# Patient Record
Sex: FEMALE | Race: OTHER | HISPANIC OR LATINO
[De-identification: names, ages, dates, MRNs, and addresses within clinical notes are randomized per-mention and may not be internally consistent; named-entity substitution may affect disease eponyms.]

---

## 2021-12-18 ENCOUNTER — NON-APPOINTMENT (OUTPATIENT)
Age: 36
End: 2021-12-18

## 2022-01-02 ENCOUNTER — TRANSCRIPTION ENCOUNTER (OUTPATIENT)
Age: 37
End: 2022-01-02

## 2022-01-10 ENCOUNTER — APPOINTMENT (OUTPATIENT)
Dept: CARDIOLOGY | Facility: CLINIC | Age: 37
End: 2022-01-10
Payer: MEDICAID

## 2022-01-10 VITALS — HEIGHT: 62 IN | BODY MASS INDEX: 53.37 KG/M2 | WEIGHT: 290 LBS

## 2022-01-10 PROCEDURE — 99204 OFFICE O/P NEW MOD 45 MIN: CPT | Mod: 95

## 2022-01-11 ENCOUNTER — TRANSCRIPTION ENCOUNTER (OUTPATIENT)
Age: 37
End: 2022-01-11

## 2022-03-28 ENCOUNTER — TRANSCRIPTION ENCOUNTER (OUTPATIENT)
Age: 37
End: 2022-03-28

## 2022-06-05 ENCOUNTER — NON-APPOINTMENT (OUTPATIENT)
Age: 37
End: 2022-06-05

## 2022-08-03 ENCOUNTER — NON-APPOINTMENT (OUTPATIENT)
Age: 37
End: 2022-08-03

## 2022-08-03 ENCOUNTER — TRANSCRIPTION ENCOUNTER (OUTPATIENT)
Age: 37
End: 2022-08-03

## 2022-08-03 ENCOUNTER — APPOINTMENT (OUTPATIENT)
Dept: GASTROENTEROLOGY | Facility: CLINIC | Age: 37
End: 2022-08-03

## 2022-08-03 PROCEDURE — 99442: CPT

## 2022-08-17 ENCOUNTER — OUTPATIENT (OUTPATIENT)
Dept: OUTPATIENT SERVICES | Facility: HOSPITAL | Age: 37
LOS: 1 days | End: 2022-08-17

## 2022-08-17 VITALS
RESPIRATION RATE: 16 BRPM | HEIGHT: 61 IN | HEART RATE: 100 BPM | TEMPERATURE: 99 F | DIASTOLIC BLOOD PRESSURE: 93 MMHG | WEIGHT: 293 LBS | OXYGEN SATURATION: 97 % | SYSTOLIC BLOOD PRESSURE: 137 MMHG

## 2022-08-17 DIAGNOSIS — E66.01 MORBID (SEVERE) OBESITY DUE TO EXCESS CALORIES: ICD-10-CM

## 2022-08-17 DIAGNOSIS — I10 ESSENTIAL (PRIMARY) HYPERTENSION: ICD-10-CM

## 2022-08-17 DIAGNOSIS — Z90.49 ACQUIRED ABSENCE OF OTHER SPECIFIED PARTS OF DIGESTIVE TRACT: Chronic | ICD-10-CM

## 2022-08-17 DIAGNOSIS — G47.30 SLEEP APNEA, UNSPECIFIED: ICD-10-CM

## 2022-08-17 LAB
ALBUMIN SERPL ELPH-MCNC: 3.8 G/DL — SIGNIFICANT CHANGE UP (ref 3.3–5)
ALP SERPL-CCNC: 103 U/L — SIGNIFICANT CHANGE UP (ref 40–120)
ALT FLD-CCNC: 107 U/L — HIGH (ref 4–33)
ANION GAP SERPL CALC-SCNC: 14 MMOL/L — SIGNIFICANT CHANGE UP (ref 7–14)
AST SERPL-CCNC: 114 U/L — HIGH (ref 4–32)
BILIRUB SERPL-MCNC: <0.2 MG/DL — SIGNIFICANT CHANGE UP (ref 0.2–1.2)
BUN SERPL-MCNC: 10 MG/DL — SIGNIFICANT CHANGE UP (ref 7–23)
CALCIUM SERPL-MCNC: 9.2 MG/DL — SIGNIFICANT CHANGE UP (ref 8.4–10.5)
CHLORIDE SERPL-SCNC: 103 MMOL/L — SIGNIFICANT CHANGE UP (ref 98–107)
CO2 SERPL-SCNC: 23 MMOL/L — SIGNIFICANT CHANGE UP (ref 22–31)
CREAT SERPL-MCNC: 0.55 MG/DL — SIGNIFICANT CHANGE UP (ref 0.5–1.3)
EGFR: 122 ML/MIN/1.73M2 — SIGNIFICANT CHANGE UP
GLUCOSE SERPL-MCNC: 148 MG/DL — HIGH (ref 70–99)
HCG UR QL: NEGATIVE — SIGNIFICANT CHANGE UP
HCT VFR BLD CALC: 40.9 % — SIGNIFICANT CHANGE UP (ref 34.5–45)
HGB BLD-MCNC: 12.8 G/DL — SIGNIFICANT CHANGE UP (ref 11.5–15.5)
MCHC RBC-ENTMCNC: 27.8 PG — SIGNIFICANT CHANGE UP (ref 27–34)
MCHC RBC-ENTMCNC: 31.3 GM/DL — LOW (ref 32–36)
MCV RBC AUTO: 88.7 FL — SIGNIFICANT CHANGE UP (ref 80–100)
NRBC # BLD: 0 /100 WBCS — SIGNIFICANT CHANGE UP (ref 0–0)
NRBC # FLD: 0 K/UL — SIGNIFICANT CHANGE UP (ref 0–0)
PLATELET # BLD AUTO: 264 K/UL — SIGNIFICANT CHANGE UP (ref 150–400)
POTASSIUM SERPL-MCNC: 3.6 MMOL/L — SIGNIFICANT CHANGE UP (ref 3.5–5.3)
POTASSIUM SERPL-SCNC: 3.6 MMOL/L — SIGNIFICANT CHANGE UP (ref 3.5–5.3)
PROT SERPL-MCNC: 6.9 G/DL — SIGNIFICANT CHANGE UP (ref 6–8.3)
RBC # BLD: 4.61 M/UL — SIGNIFICANT CHANGE UP (ref 3.8–5.2)
RBC # FLD: 14.6 % — HIGH (ref 10.3–14.5)
SODIUM SERPL-SCNC: 140 MMOL/L — SIGNIFICANT CHANGE UP (ref 135–145)
WBC # BLD: 8 K/UL — SIGNIFICANT CHANGE UP (ref 3.8–10.5)
WBC # FLD AUTO: 8 K/UL — SIGNIFICANT CHANGE UP (ref 3.8–10.5)

## 2022-08-17 PROCEDURE — 93010 ELECTROCARDIOGRAM REPORT: CPT

## 2022-08-17 NOTE — H&P PST ADULT - VENOUS THROMBOEMBOLISM
no

## 2022-08-17 NOTE — H&P PST ADULT - HEIGHT IN CM
[facility]  Obstetrics  Postpartum Progress Note    Patient Name: Olga Prater  MRN: [id number]  Admission Date: 2019  Hospital Length of Stay: 3 days  Attending Physician: Mohan Pastrana MD  Primary Care Provider: Primary Doctor No    Subjective:     Principal Problem: delivery delivered    Hospital course: POD#1: Feeling well, denies complaints  POD#2: Patient notes increased pain, wants to try Norco for pain control.   POD#3: Feeling well, denies complaints      Interval History:     She is doing well this morning. She is tolerating a regular diet without nausea or vomiting. She is voiding spontaneously. She is ambulating. She has passed flatus, and has a BM. Vaginal bleeding is mild. She denies fever or chills. Abdominal pain is mild and controlled with oral medications.     Objective:     Vital Signs (Most Recent):  Temp: 98.2 °F (36.8 °C) (19 0000)  Pulse: 70 (19 0000)  Resp: 18 (19 0000)  BP: 121/77 (19 0000)  SpO2: 98 % (19 0000) Vital Signs (24h Range):  Temp:  [97.8 °F (36.6 °C)-98.7 °F (37.1 °C)] 98.2 °F (36.8 °C)  Pulse:  [] 70  Resp:  [18] 18  SpO2:  [96 %-99 %] 98 %  BP: (112-130)/(60-80) 121/77     Weight: 64.5 kg (142 lb 3.2 oz)  Body mass index is 26.87 kg/m².    No intake or output data in the 24 hours ending 19 0842    Significant Labs:  Lab Results   Component Value Date    GROUPTRH O POS 2019    HEPBSAG Negative 2018    STREPBCULT No Group B Streptococcus isolated 2019     No results for input(s): HGB, HCT in the last 48 hours.    I have personallly reviewed all pertinent lab results from the last 24 hours.    Physical Exam:   Constitutional: She is oriented to person, place, and time. She appears well-developed and well-nourished. No distress.    HENT:   Head: Normocephalic and atraumatic.      Cardiovascular: Normal rate, regular rhythm, normal heart sounds and intact distal pulses.      Pulmonary/Chest: Effort normal. No respiratory distress.        Abdominal: Soft. Bowel sounds are normal. Abdominal incision: c/d/i. There is no guarding.   Firm fundus below umbilicus     Genitourinary: Uterus normal. No vaginal discharge found.           Musculoskeletal: Normal range of motion and moves all extremeties.       Neurological: She is alert and oriented to person, place, and time.    Skin: Skin is warm. She is not diaphoretic.    Psychiatric: She has a normal mood and affect. Her behavior is normal. Judgment and thought content normal.       Assessment/Plan:     32 y.o. female  for:    *  delivery delivered  Good post operative condition    Acute blood loss anemia  Acute blood loss anemia due to intrapartum blood loss. The patient is asymptomatic of the anemia - no intervention is indicated.  Restart PNV/iron with stool softner on DC.          Disposition: As patient meets milestones, will plan to discharge today.    Rita Centeno MD  Obstetrics  [facility]       [phone]

## 2022-08-17 NOTE — H&P PST ADULT - NSICDXPASTMEDICALHX_GEN_ALL_CORE_FT
PAST MEDICAL HISTORY:  H/O sleep apnea uses device at night. Done with [facility] [phone]    Morbid obesity

## 2022-08-17 NOTE — H&P PST ADULT - REASON FOR ADMISSION
they are going down my throat because I am to have a gastric sleeve procedure they are going down my throat to see if everything is okay because I am to have a gastric sleeve procedure

## 2022-08-17 NOTE — H&P PST ADULT - PROBLEM SELECTOR PLAN 1
This is a 37 y/o female who is scheduled for esophagogastroduodenoscopy on 9-6-22  * Ordered covid-19 pcr; given phone number and places that do covid testing to be done 3-5 days before surgery  * Given preop instructions

## 2022-08-17 NOTE — H&P PST ADULT - HISTORY OF PRESENT ILLNESS
This is a 37 y/o female who presents with morbid obesity. Having work up for pending gastric sleeve procedure. Scheduled for esophagogastroduodenoscopy

## 2022-09-01 ENCOUNTER — NON-APPOINTMENT (OUTPATIENT)
Age: 37
End: 2022-09-01

## 2022-09-06 ENCOUNTER — OUTPATIENT (OUTPATIENT)
Dept: OUTPATIENT SERVICES | Facility: HOSPITAL | Age: 37
LOS: 1 days | Discharge: HOME | End: 2022-09-06

## 2022-09-06 ENCOUNTER — APPOINTMENT (OUTPATIENT)
Dept: GASTROENTEROLOGY | Facility: HOSPITAL | Age: 37
End: 2022-09-06

## 2022-09-06 ENCOUNTER — RESULT REVIEW (OUTPATIENT)
Age: 37
End: 2022-09-06

## 2022-09-06 VITALS
WEIGHT: 293 LBS | SYSTOLIC BLOOD PRESSURE: 149 MMHG | RESPIRATION RATE: 20 BRPM | HEART RATE: 84 BPM | HEIGHT: 61 IN | DIASTOLIC BLOOD PRESSURE: 75 MMHG | TEMPERATURE: 97 F | OXYGEN SATURATION: 98 %

## 2022-09-06 VITALS
RESPIRATION RATE: 14 BRPM | DIASTOLIC BLOOD PRESSURE: 84 MMHG | HEART RATE: 84 BPM | SYSTOLIC BLOOD PRESSURE: 120 MMHG | OXYGEN SATURATION: 99 %

## 2022-09-06 DIAGNOSIS — Z90.49 ACQUIRED ABSENCE OF OTHER SPECIFIED PARTS OF DIGESTIVE TRACT: Chronic | ICD-10-CM

## 2022-09-06 DIAGNOSIS — E66.01 MORBID (SEVERE) OBESITY DUE TO EXCESS CALORIES: ICD-10-CM

## 2022-09-06 LAB — HCG UR QL: NEGATIVE — SIGNIFICANT CHANGE UP

## 2022-09-06 PROCEDURE — 88305 TISSUE EXAM BY PATHOLOGIST: CPT | Mod: 26

## 2022-09-06 PROCEDURE — 43239 EGD BIOPSY SINGLE/MULTIPLE: CPT

## 2022-09-06 NOTE — ASU PATIENT PROFILE, ADULT - FALL HARM RISK - HARM RISK INTERVENTIONS

## 2022-09-06 NOTE — ASU PATIENT PROFILE, ADULT - NSICDXPASTMEDICALHX_GEN_ALL_CORE_FT
PAST MEDICAL HISTORY:  H/O sleep apnea uses device at night. Done with [facility] [phone]    Morbid obesity

## 2022-09-12 LAB — SURGICAL PATHOLOGY STUDY: SIGNIFICANT CHANGE UP

## 2022-09-13 ENCOUNTER — NON-APPOINTMENT (OUTPATIENT)
Age: 37
End: 2022-09-13

## 2022-11-09 ENCOUNTER — NON-APPOINTMENT (OUTPATIENT)
Age: 37
End: 2022-11-09

## 2022-11-10 PROBLEM — E66.01 MORBID (SEVERE) OBESITY DUE TO EXCESS CALORIES: Chronic | Status: ACTIVE | Noted: 2022-08-17

## 2022-11-14 ENCOUNTER — OUTPATIENT (OUTPATIENT)
Dept: OUTPATIENT SERVICES | Facility: HOSPITAL | Age: 37
LOS: 1 days | End: 2022-11-14
Payer: MEDICAID

## 2022-11-14 ENCOUNTER — APPOINTMENT (OUTPATIENT)
Dept: OBGYN | Facility: CLINIC | Age: 37
End: 2022-11-14

## 2022-11-14 VITALS
SYSTOLIC BLOOD PRESSURE: 118 MMHG | BODY MASS INDEX: 53.92 KG/M2 | HEART RATE: 106 BPM | DIASTOLIC BLOOD PRESSURE: 74 MMHG | HEIGHT: 62 IN | OXYGEN SATURATION: 100 % | RESPIRATION RATE: 18 BRPM | TEMPERATURE: 98.2 F | WEIGHT: 293 LBS

## 2022-11-14 DIAGNOSIS — Z90.49 ACQUIRED ABSENCE OF OTHER SPECIFIED PARTS OF DIGESTIVE TRACT: Chronic | ICD-10-CM

## 2022-11-14 DIAGNOSIS — Z34.00 ENCOUNTER FOR SUPERVISION OF NORMAL FIRST PREGNANCY, UNSPECIFIED TRIMESTER: ICD-10-CM

## 2022-11-14 PROCEDURE — 80053 COMPREHEN METABOLIC PANEL: CPT

## 2022-11-14 PROCEDURE — 36415 COLL VENOUS BLD VENIPUNCTURE: CPT | Mod: NC

## 2022-11-14 PROCEDURE — 86787 VARICELLA-ZOSTER ANTIBODY: CPT

## 2022-11-14 PROCEDURE — 87800 DETECT AGNT MULT DNA DIREC: CPT

## 2022-11-14 PROCEDURE — 84156 ASSAY OF PROTEIN URINE: CPT

## 2022-11-14 PROCEDURE — 86480 TB TEST CELL IMMUN MEASURE: CPT

## 2022-11-14 PROCEDURE — 81420 FETAL CHRMOML ANEUPLOIDY: CPT

## 2022-11-14 PROCEDURE — G0463: CPT

## 2022-11-14 PROCEDURE — 86780 TREPONEMA PALLIDUM: CPT

## 2022-11-14 PROCEDURE — 86765 RUBEOLA ANTIBODY: CPT

## 2022-11-14 PROCEDURE — 86850 RBC ANTIBODY SCREEN: CPT

## 2022-11-14 PROCEDURE — 81329 SMN1 GENE DOS/DELETION ALYS: CPT

## 2022-11-14 PROCEDURE — 81025 URINE PREGNANCY TEST: CPT

## 2022-11-14 PROCEDURE — 87491 CHLMYD TRACH DNA AMP PROBE: CPT

## 2022-11-14 PROCEDURE — 83020 HEMOGLOBIN ELECTROPHORESIS: CPT

## 2022-11-14 PROCEDURE — 85025 COMPLETE CBC W/AUTO DIFF WBC: CPT

## 2022-11-14 PROCEDURE — 99204 OFFICE O/P NEW MOD 45 MIN: CPT

## 2022-11-14 PROCEDURE — 83036 HEMOGLOBIN GLYCOSYLATED A1C: CPT

## 2022-11-14 PROCEDURE — 87340 HEPATITIS B SURFACE AG IA: CPT

## 2022-11-14 PROCEDURE — 81220 CFTR GENE COM VARIANTS: CPT

## 2022-11-14 PROCEDURE — 87624 HPV HI-RISK TYP POOLED RSLT: CPT

## 2022-11-14 PROCEDURE — 84550 ASSAY OF BLOOD/URIC ACID: CPT

## 2022-11-14 PROCEDURE — 87591 N.GONORRHOEAE DNA AMP PROB: CPT

## 2022-11-14 PROCEDURE — 36415 COLL VENOUS BLD VENIPUNCTURE: CPT

## 2022-11-14 PROCEDURE — 87086 URINE CULTURE/COLONY COUNT: CPT

## 2022-11-14 PROCEDURE — 84443 ASSAY THYROID STIM HORMONE: CPT

## 2022-11-14 PROCEDURE — 87389 HIV-1 AG W/HIV-1&-2 AB AG IA: CPT

## 2022-11-14 PROCEDURE — 84439 ASSAY OF FREE THYROXINE: CPT

## 2022-11-14 PROCEDURE — 86901 BLOOD TYPING SEROLOGIC RH(D): CPT

## 2022-11-14 PROCEDURE — 86803 HEPATITIS C AB TEST: CPT

## 2022-11-14 PROCEDURE — 86900 BLOOD TYPING SEROLOGIC ABO: CPT

## 2022-11-14 PROCEDURE — 86762 RUBELLA ANTIBODY: CPT

## 2022-11-14 PROCEDURE — 83655 ASSAY OF LEAD: CPT

## 2022-11-14 PROCEDURE — 81003 URINALYSIS AUTO W/O SCOPE: CPT

## 2022-11-16 ENCOUNTER — NON-APPOINTMENT (OUTPATIENT)
Age: 37
End: 2022-11-16

## 2022-11-16 DIAGNOSIS — Z34.92 ENCOUNTER FOR SUPERVISION OF NORMAL PREGNANCY, UNSPECIFIED, SECOND TRIMESTER: ICD-10-CM

## 2022-11-16 DIAGNOSIS — Z12.4 ENCOUNTER FOR SCREENING FOR MALIGNANT NEOPLASM OF CERVIX: ICD-10-CM

## 2022-11-16 DIAGNOSIS — Z3A.16 16 WEEKS GESTATION OF PREGNANCY: ICD-10-CM

## 2022-11-16 DIAGNOSIS — E66.01 MORBID (SEVERE) OBESITY DUE TO EXCESS CALORIES: ICD-10-CM

## 2022-11-16 DIAGNOSIS — Z11.3 ENCOUNTER FOR SCREENING FOR INFECTIONS WITH A PREDOMINANTLY SEXUAL MODE OF TRANSMISSION: ICD-10-CM

## 2022-11-16 LAB
ABO + RH PNL BLD: NORMAL
ALBUMIN SERPL ELPH-MCNC: 3.8 G/DL
ALP BLD-CCNC: 74 U/L
ALT SERPL-CCNC: 75 U/L
ANION GAP SERPL CALC-SCNC: 11 MMOL/L
AST SERPL-CCNC: 82 U/L
BASOPHILS # BLD AUTO: 0.04 K/UL
BASOPHILS NFR BLD AUTO: 0.5 %
BILIRUB SERPL-MCNC: 0.2 MG/DL
BLD GP AB SCN SERPL QL: NORMAL
BUN SERPL-MCNC: 10 MG/DL
C TRACH RRNA SPEC QL NAA+PROBE: NOT DETECTED
CALCIUM SERPL-MCNC: 9.4 MG/DL
CANDIDA VAG CYTO: NOT DETECTED
CHLORIDE SERPL-SCNC: 104 MMOL/L
CO2 SERPL-SCNC: 24 MMOL/L
CREAT SERPL-MCNC: 0.56 MG/DL
CREAT SPEC-SCNC: 176 MG/DL
CREAT/PROT UR: 0.1 RATIO
EGFR: 120 ML/MIN/1.73M2
EOSINOPHIL # BLD AUTO: 0.18 K/UL
EOSINOPHIL NFR BLD AUTO: 2.1 %
ESTIMATED AVERAGE GLUCOSE: 114 MG/DL
G VAGINALIS+PREV SP MTYP VAG QL MICRO: NOT DETECTED
GLUCOSE SERPL-MCNC: 96 MG/DL
HBA1C MFR BLD HPLC: 5.6 %
HBV SURFACE AG SER QL: NONREACTIVE
HCT VFR BLD CALC: 40.5 %
HCV AB SER QL: NONREACTIVE
HCV S/CO RATIO: 0.18 S/CO
HGB A MFR BLD: 97.2 %
HGB A2 MFR BLD: 2.5 %
HGB BLD-MCNC: 12.9 G/DL
HGB F MFR BLD: 0.3 %
HGB FRACT BLD-IMP: NORMAL
HIV1+2 AB SPEC QL IA.RAPID: NONREACTIVE
HPV HIGH+LOW RISK DNA PNL CVX: NOT DETECTED
IMM GRANULOCYTES NFR BLD AUTO: 0.2 %
LEAD BLD-MCNC: <1 UG/DL
LYMPHOCYTES # BLD AUTO: 2.45 K/UL
LYMPHOCYTES NFR BLD AUTO: 28.4 %
M TB IFN-G BLD-IMP: NEGATIVE
MAN DIFF?: NORMAL
MCHC RBC-ENTMCNC: 28.9 PG
MCHC RBC-ENTMCNC: 31.9 GM/DL
MCV RBC AUTO: 90.6 FL
MEV IGG FLD QL IA: 9.8 AU/ML
MEV IGG+IGM SER-IMP: NEGATIVE
MONOCYTES # BLD AUTO: 0.66 K/UL
MONOCYTES NFR BLD AUTO: 7.6 %
N GONORRHOEA RRNA SPEC QL NAA+PROBE: NOT DETECTED
NEUTROPHILS # BLD AUTO: 5.28 K/UL
NEUTROPHILS NFR BLD AUTO: 61.2 %
PLATELET # BLD AUTO: 272 K/UL
POTASSIUM SERPL-SCNC: 4.4 MMOL/L
PROT SERPL-MCNC: 6.8 G/DL
PROT UR-MCNC: 22 MG/DL
QUANTIFERON TB PLUS MITOGEN MINUS NIL: >10 IU/ML
QUANTIFERON TB PLUS NIL: 0.04 IU/ML
QUANTIFERON TB PLUS TB1 MINUS NIL: 0 IU/ML
QUANTIFERON TB PLUS TB2 MINUS NIL: 0 IU/ML
RBC # BLD: 4.47 M/UL
RBC # FLD: 15.1 %
RUBV IGG FLD-ACNC: 1.4 INDEX
RUBV IGG SER-IMP: POSITIVE
SODIUM SERPL-SCNC: 139 MMOL/L
SOURCE TP AMPLIFICATION: NORMAL
T PALLIDUM AB SER QL IA: NEGATIVE
T VAGINALIS VAG QL WET PREP: NOT DETECTED
T4 FREE SERPL-MCNC: 1 NG/DL
TSH SERPL-ACNC: 1 UIU/ML
URATE SERPL-MCNC: 5 MG/DL
VZV AB TITR SER: POSITIVE
VZV IGG SER IF-ACNC: 976.5 INDEX
WBC # FLD AUTO: 8.63 K/UL

## 2022-11-17 LAB — BACTERIA UR CULT: NORMAL

## 2022-11-18 ENCOUNTER — OUTPATIENT (OUTPATIENT)
Dept: OUTPATIENT SERVICES | Facility: HOSPITAL | Age: 37
LOS: 1 days | End: 2022-11-18
Payer: MEDICAID

## 2022-11-18 DIAGNOSIS — Z34.90 ENCOUNTER FOR SUPERVISION OF NORMAL PREGNANCY, UNSPECIFIED, UNSPECIFIED TRIMESTER: ICD-10-CM

## 2022-11-18 DIAGNOSIS — Z90.49 ACQUIRED ABSENCE OF OTHER SPECIFIED PARTS OF DIGESTIVE TRACT: Chronic | ICD-10-CM

## 2022-11-18 LAB — AR GENE MUT ANL BLD/T: NORMAL

## 2022-11-18 PROCEDURE — 81243 FMR1 GEN ALY DETC ABNL ALLEL: CPT

## 2022-11-18 PROCEDURE — 36415 COLL VENOUS BLD VENIPUNCTURE: CPT

## 2022-11-21 LAB
CFTR MUT TESTED BLD/T: NEGATIVE
CYTOLOGY CVX/VAG DOC THIN PREP: NORMAL
FMR1 GENE MUT ANL BLD/T: NORMAL

## 2022-11-23 ENCOUNTER — NON-APPOINTMENT (OUTPATIENT)
Age: 37
End: 2022-11-23

## 2022-11-28 ENCOUNTER — OUTPATIENT (OUTPATIENT)
Dept: OUTPATIENT SERVICES | Facility: HOSPITAL | Age: 37
LOS: 1 days | End: 2022-11-28
Payer: MEDICAID

## 2022-11-28 DIAGNOSIS — Z90.49 ACQUIRED ABSENCE OF OTHER SPECIFIED PARTS OF DIGESTIVE TRACT: Chronic | ICD-10-CM

## 2022-11-28 DIAGNOSIS — Z34.90 ENCOUNTER FOR SUPERVISION OF NORMAL PREGNANCY, UNSPECIFIED, UNSPECIFIED TRIMESTER: ICD-10-CM

## 2022-11-28 PROCEDURE — 81420 FETAL CHRMOML ANEUPLOIDY: CPT

## 2022-11-28 PROCEDURE — 36415 COLL VENOUS BLD VENIPUNCTURE: CPT

## 2022-12-01 ENCOUNTER — ASOB RESULT (OUTPATIENT)
Age: 37
End: 2022-12-01

## 2022-12-01 ENCOUNTER — APPOINTMENT (OUTPATIENT)
Dept: ANTEPARTUM | Facility: CLINIC | Age: 37
End: 2022-12-01

## 2022-12-01 PROCEDURE — 76805 OB US >/= 14 WKS SNGL FETUS: CPT

## 2022-12-06 LAB
CLARI TEST COMMENT: NORMAL
CLARIM 22Q11.2: NORMAL
CLARIM ADDITIONAL INFO: NORMAL
CLARIM CHROMOSOME 13: NORMAL
CLARIM CHROMOSOME 18: NORMAL
CLARIM CHROMOSOME 21: NORMAL
CLARIM EDD: NORMAL
CLARIM SEX CHROMOSOMES: NORMAL
CLARITEST NIPT W/MICRO: NORMAL
FETAL FRACT: NORMAL
GESTATION AGE: NORMAL
MATERNAL WEIGHT (LBS):: 306
PLEASE INCLUDE GENDER RESULTS ON THIS REPORT:: NORMAL
TYPE OF PREGNANCY:: NORMAL

## 2022-12-08 ENCOUNTER — APPOINTMENT (OUTPATIENT)
Dept: ULTRASOUND IMAGING | Facility: HOSPITAL | Age: 37
End: 2022-12-08

## 2022-12-08 ENCOUNTER — OUTPATIENT (OUTPATIENT)
Dept: OUTPATIENT SERVICES | Facility: HOSPITAL | Age: 37
LOS: 1 days | End: 2022-12-08
Payer: MEDICAID

## 2022-12-08 DIAGNOSIS — R74.8 ABNORMAL LEVELS OF OTHER SERUM ENZYMES: ICD-10-CM

## 2022-12-08 DIAGNOSIS — Z90.49 ACQUIRED ABSENCE OF OTHER SPECIFIED PARTS OF DIGESTIVE TRACT: Chronic | ICD-10-CM

## 2022-12-08 PROCEDURE — 76705 ECHO EXAM OF ABDOMEN: CPT | Mod: 26

## 2022-12-08 PROCEDURE — 76705 ECHO EXAM OF ABDOMEN: CPT

## 2022-12-13 ENCOUNTER — NON-APPOINTMENT (OUTPATIENT)
Age: 37
End: 2022-12-13

## 2022-12-14 ENCOUNTER — OUTPATIENT (OUTPATIENT)
Dept: OUTPATIENT SERVICES | Facility: HOSPITAL | Age: 37
LOS: 1 days | End: 2022-12-14
Payer: MEDICAID

## 2022-12-14 ENCOUNTER — APPOINTMENT (OUTPATIENT)
Dept: OBGYN | Facility: CLINIC | Age: 37
End: 2022-12-14

## 2022-12-14 VITALS
OXYGEN SATURATION: 97 % | DIASTOLIC BLOOD PRESSURE: 86 MMHG | HEART RATE: 100 BPM | SYSTOLIC BLOOD PRESSURE: 127 MMHG | HEIGHT: 62 IN | WEIGHT: 293 LBS | BODY MASS INDEX: 53.92 KG/M2 | TEMPERATURE: 97.7 F

## 2022-12-14 VITALS — SYSTOLIC BLOOD PRESSURE: 116 MMHG | DIASTOLIC BLOOD PRESSURE: 82 MMHG

## 2022-12-14 DIAGNOSIS — Z98.890 OTHER SPECIFIED POSTPROCEDURAL STATES: ICD-10-CM

## 2022-12-14 DIAGNOSIS — Z90.49 ACQUIRED ABSENCE OF OTHER SPECIFIED PARTS OF DIGESTIVE TRACT: Chronic | ICD-10-CM

## 2022-12-14 DIAGNOSIS — Z34.00 ENCOUNTER FOR SUPERVISION OF NORMAL FIRST PREGNANCY, UNSPECIFIED TRIMESTER: ICD-10-CM

## 2022-12-14 PROCEDURE — 85025 COMPLETE CBC W/AUTO DIFF WBC: CPT

## 2022-12-14 PROCEDURE — 82105 ALPHA-FETOPROTEIN SERUM: CPT

## 2022-12-14 PROCEDURE — 80053 COMPREHEN METABOLIC PANEL: CPT

## 2022-12-14 PROCEDURE — 81003 URINALYSIS AUTO W/O SCOPE: CPT

## 2022-12-14 PROCEDURE — 36415 COLL VENOUS BLD VENIPUNCTURE: CPT

## 2022-12-14 PROCEDURE — 99214 OFFICE O/P EST MOD 30 MIN: CPT

## 2022-12-14 PROCEDURE — G0463: CPT

## 2022-12-15 ENCOUNTER — NON-APPOINTMENT (OUTPATIENT)
Age: 37
End: 2022-12-15

## 2022-12-15 DIAGNOSIS — G47.30 SLEEP APNEA, UNSPECIFIED: ICD-10-CM

## 2022-12-15 DIAGNOSIS — E66.01 MORBID (SEVERE) OBESITY DUE TO EXCESS CALORIES: ICD-10-CM

## 2022-12-15 DIAGNOSIS — O09.40 SUPERVISION OF PREGNANCY WITH GRAND MULTIPARITY, UNSPECIFIED TRIMESTER: ICD-10-CM

## 2022-12-15 DIAGNOSIS — Z34.92 ENCOUNTER FOR SUPERVISION OF NORMAL PREGNANCY, UNSPECIFIED, SECOND TRIMESTER: ICD-10-CM

## 2022-12-15 DIAGNOSIS — R74.8 ABNORMAL LEVELS OF OTHER SERUM ENZYMES: ICD-10-CM

## 2022-12-16 LAB
ALBUMIN SERPL ELPH-MCNC: 3.7 G/DL
ALP BLD-CCNC: 78 U/L
ALT SERPL-CCNC: 102 U/L
ANION GAP SERPL CALC-SCNC: 12 MMOL/L
AST SERPL-CCNC: 162 U/L
BASOPHILS # BLD AUTO: 0.04 K/UL
BASOPHILS NFR BLD AUTO: 0.4 %
BILIRUB SERPL-MCNC: 0.3 MG/DL
BUN SERPL-MCNC: 9 MG/DL
CALCIUM SERPL-MCNC: 9.2 MG/DL
CHLORIDE SERPL-SCNC: 102 MMOL/L
CO2 SERPL-SCNC: 22 MMOL/L
CREAT SERPL-MCNC: 0.52 MG/DL
EGFR: 123 ML/MIN/1.73M2
EOSINOPHIL # BLD AUTO: 0.08 K/UL
EOSINOPHIL NFR BLD AUTO: 0.8 %
GLUCOSE SERPL-MCNC: 92 MG/DL
HCT VFR BLD CALC: 40.4 %
HGB BLD-MCNC: 12.9 G/DL
IMM GRANULOCYTES NFR BLD AUTO: 0.4 %
LYMPHOCYTES # BLD AUTO: 2.29 K/UL
LYMPHOCYTES NFR BLD AUTO: 23 %
MAN DIFF?: NORMAL
MCHC RBC-ENTMCNC: 29.3 PG
MCHC RBC-ENTMCNC: 31.9 GM/DL
MCV RBC AUTO: 91.8 FL
MONOCYTES # BLD AUTO: 0.62 K/UL
MONOCYTES NFR BLD AUTO: 6.2 %
NEUTROPHILS # BLD AUTO: 6.88 K/UL
NEUTROPHILS NFR BLD AUTO: 69.2 %
PLATELET # BLD AUTO: 316 K/UL
POTASSIUM SERPL-SCNC: 4.4 MMOL/L
PROT SERPL-MCNC: 6.8 G/DL
RBC # BLD: 4.4 M/UL
RBC # FLD: 15.2 %
SODIUM SERPL-SCNC: 137 MMOL/L
WBC # FLD AUTO: 9.95 K/UL

## 2022-12-19 LAB
AFP MOM: 0.55
AFP VALUE: 28 NG/ML
ALPHA FETOPROTEIN SERUM COMMENT: NORMAL
ALPHA FETOPROTEIN SERUM INTERPRETATION: NORMAL
ALPHA FETOPROTEIN SERUM RESULTS: NORMAL
ALPHA FETOPROTEIN SERUM TEST RESULTS: NORMAL
GESTATIONAL AGE BASED ON: NORMAL
GESTATIONAL AGE ON COLLECTION DATE: 21.9 WEEKS
INSULIN DEP DIABETES: NO
MATERNAL AGE AT EDD AFP: 37.5 YR
MULTIPLE GESTATION: NO
OSBR RISK 1 IN: NORMAL
RACE: NORMAL
WEIGHT AFP: 308 LBS

## 2023-01-04 ENCOUNTER — NON-APPOINTMENT (OUTPATIENT)
Age: 38
End: 2023-01-04

## 2023-01-04 ENCOUNTER — APPOINTMENT (OUTPATIENT)
Dept: OBGYN | Facility: CLINIC | Age: 38
End: 2023-01-04
Payer: MEDICAID

## 2023-01-04 ENCOUNTER — ASOB RESULT (OUTPATIENT)
Age: 38
End: 2023-01-04

## 2023-01-04 ENCOUNTER — APPOINTMENT (OUTPATIENT)
Dept: ANTEPARTUM | Facility: CLINIC | Age: 38
End: 2023-01-04
Payer: MEDICAID

## 2023-01-04 VITALS
BODY MASS INDEX: 53.92 KG/M2 | HEIGHT: 62 IN | SYSTOLIC BLOOD PRESSURE: 116 MMHG | TEMPERATURE: 97.7 F | HEART RATE: 99 BPM | DIASTOLIC BLOOD PRESSURE: 79 MMHG | WEIGHT: 293 LBS

## 2023-01-04 VITALS
HEIGHT: 62 IN | SYSTOLIC BLOOD PRESSURE: 116 MMHG | WEIGHT: 293 LBS | DIASTOLIC BLOOD PRESSURE: 79 MMHG | HEART RATE: 99 BPM | BODY MASS INDEX: 53.92 KG/M2

## 2023-01-04 PROCEDURE — 99204 OFFICE O/P NEW MOD 45 MIN: CPT | Mod: 25

## 2023-01-04 PROCEDURE — 99213 OFFICE O/P EST LOW 20 MIN: CPT

## 2023-01-04 PROCEDURE — 76811 OB US DETAILED SNGL FETUS: CPT

## 2023-01-04 RX ORDER — BISMUTH SUBSALICYLATE 525MG/15ML
525 SUSPENSION, ORAL (FINAL DOSE FORM) ORAL 4 TIMES DAILY
Qty: 4 | Refills: 0 | Status: COMPLETED | COMMUNITY
Start: 2022-09-13 | End: 2023-01-04

## 2023-01-04 RX ORDER — DOXYCYCLINE HYCLATE 100 MG/1
100 CAPSULE ORAL
Qty: 28 | Refills: 0 | Status: COMPLETED | COMMUNITY
Start: 2022-09-13 | End: 2023-01-04

## 2023-01-04 RX ORDER — ERGOCALCIFEROL 1.25 MG/1
1.25 MG CAPSULE, LIQUID FILLED ORAL
Qty: 4 | Refills: 0 | Status: COMPLETED | COMMUNITY
Start: 2022-11-23

## 2023-01-04 RX ORDER — METRONIDAZOLE 500 MG/1
500 TABLET ORAL 3 TIMES DAILY
Qty: 42 | Refills: 0 | Status: COMPLETED | COMMUNITY
Start: 2022-09-13 | End: 2023-01-04

## 2023-01-04 RX ORDER — OMEPRAZOLE 40 MG/1
40 CAPSULE, DELAYED RELEASE ORAL
Qty: 28 | Refills: 0 | Status: COMPLETED | COMMUNITY
Start: 2022-09-13 | End: 2023-01-04

## 2023-01-04 NOTE — PHYSICAL EXAM
[Appropriately responsive] : appropriately responsive [Alert] : alert [No Acute Distress] : no acute distress [No Lymphadenopathy] : no lymphadenopathy [Regular Rate Rhythm] : regular rate rhythm [No Murmurs] : no murmurs [Clear to Auscultation B/L] : clear to auscultation bilaterally [Soft] : soft [Non-tender] : non-tender [Non-distended] : non-distended [No HSM] : No HSM [No Lesions] : no lesions [No Mass] : no mass [Oriented x3] : oriented x3 [Normal] : normal [Normal Position] : in a normal position [Enlarged ___ wks] : enlarged [unfilled] ~Uweeks

## 2023-01-04 NOTE — HISTORY OF PRESENT ILLNESS
[FreeTextEntry1] :  lmp 2022 edc 2023 ega 21weeks came for routine prenatal care.tranferred from clinic.dr pyle  told me she is being transferred to high risk clinic [unknown] : Patient is unsure of the date of her LMP [Regular Cycle Intervals] : periods have been regular [Menarche Age: ____] : age at menarche was [unfilled] [No] : Patient does not have concerns regarding sex [Currently Active] : currently active [Men] : men [Vaginal] : vaginal

## 2023-01-05 ENCOUNTER — APPOINTMENT (OUTPATIENT)
Dept: ANTEPARTUM | Facility: CLINIC | Age: 38
End: 2023-01-05
Payer: MEDICAID

## 2023-01-05 ENCOUNTER — ASOB RESULT (OUTPATIENT)
Age: 38
End: 2023-01-05

## 2023-01-05 DIAGNOSIS — Z81.8 FAMILY HISTORY OF OTHER MENTAL AND BEHAVIORAL DISORDERS: ICD-10-CM

## 2023-01-05 LAB
ALBUMIN SERPL ELPH-MCNC: 3.6 G/DL
ALP BLD-CCNC: 93 U/L
ALT SERPL-CCNC: 51 U/L
ANION GAP SERPL CALC-SCNC: 15 MMOL/L
AST SERPL-CCNC: 84 U/L
BASOPHILS # BLD AUTO: 0.03 K/UL
BASOPHILS NFR BLD AUTO: 0.3 %
BILIRUB SERPL-MCNC: 0.2 MG/DL
BUN SERPL-MCNC: 11 MG/DL
CALCIUM SERPL-MCNC: 9.8 MG/DL
CHLORIDE SERPL-SCNC: 102 MMOL/L
CMV IGG SERPL QL: >10 U/ML
CMV IGG SERPL-IMP: POSITIVE
CMV IGM SERPL QL: <8 AU/ML
CMV IGM SERPL QL: NEGATIVE
CO2 SERPL-SCNC: 23 MMOL/L
CREAT SERPL-MCNC: 0.47 MG/DL
EGFR: 126 ML/MIN/1.73M2
EOSINOPHIL # BLD AUTO: 0.13 K/UL
EOSINOPHIL NFR BLD AUTO: 1.3 %
GLUCOSE SERPL-MCNC: 51 MG/DL
HCT VFR BLD CALC: 38.9 %
HGB BLD-MCNC: 11.7 G/DL
IMM GRANULOCYTES NFR BLD AUTO: 0.5 %
LDH SERPL-CCNC: 255 U/L
LYMPHOCYTES # BLD AUTO: 2.2 K/UL
LYMPHOCYTES NFR BLD AUTO: 22.4 %
MAN DIFF?: NORMAL
MCHC RBC-ENTMCNC: 28.9 PG
MCHC RBC-ENTMCNC: 30.1 GM/DL
MCV RBC AUTO: 96 FL
MONOCYTES # BLD AUTO: 0.67 K/UL
MONOCYTES NFR BLD AUTO: 6.8 %
NEUTROPHILS # BLD AUTO: 6.75 K/UL
NEUTROPHILS NFR BLD AUTO: 68.7 %
PLATELET # BLD AUTO: 350 K/UL
POTASSIUM SERPL-SCNC: 4.4 MMOL/L
PROT SERPL-MCNC: 6.7 G/DL
RBC # BLD: 4.05 M/UL
RBC # FLD: 14.9 %
SODIUM SERPL-SCNC: 141 MMOL/L
T GONDII AB SER-IMP: NEGATIVE
T GONDII AB SER-IMP: NEGATIVE
T GONDII IGG SER QL: <3 IU/ML
T GONDII IGM SER QL: <3 AU/ML
WBC # FLD AUTO: 9.83 K/UL

## 2023-01-05 PROCEDURE — 99441: CPT

## 2023-01-09 ENCOUNTER — APPOINTMENT (OUTPATIENT)
Dept: GASTROENTEROLOGY | Facility: CLINIC | Age: 38
End: 2023-01-09
Payer: MEDICAID

## 2023-01-09 ENCOUNTER — APPOINTMENT (OUTPATIENT)
Dept: PEDIATRIC CARDIOLOGY | Facility: CLINIC | Age: 38
End: 2023-01-09

## 2023-01-09 VITALS
DIASTOLIC BLOOD PRESSURE: 72 MMHG | HEIGHT: 62 IN | OXYGEN SATURATION: 99 % | HEART RATE: 99 BPM | SYSTOLIC BLOOD PRESSURE: 112 MMHG | BODY MASS INDEX: 53.92 KG/M2 | WEIGHT: 293 LBS

## 2023-01-09 PROCEDURE — 99205 OFFICE O/P NEW HI 60 MIN: CPT

## 2023-01-09 NOTE — ASSESSMENT
[FreeTextEntry1] : Abdominal Pain: The patient complains of abdominal pain. The patient is to avoid nonsteroidal anti-inflammatory drugs and aspirin.  I recommend a trial of Phazyme 1 tablet PO 3 times a day for 3 months for the symptoms.\par Dyspepsia: The patient complains of dyspeptic symptoms.  The patient was advised to abide by an anti-gas (low FOD-MAP) diet.  The patient was given a pamphlet for anti-gas (low FOD-MAP).  The patient and I reviewed the anti-gas (low FOD-MAP) diet at length. The patient is to start on a trial of Simethicone one tablet 4 times a day p.r.n. abdominal pain and gas.\par GERD: The patient was advised to avoid late-night meals and dietary indiscretions.  The patient was advised to avoid fried and fatty foods.  The patient was advised to abide by an anti-GERD diet. The patient was given a pamphlet for anti-GERD.  The patient and I reviewed the anti-GERD diet at length. I recommend a trial of famotidine 40 mg twice a day x 3 months for the symptoms.\par Obesity: The patient is morbidly obese.  The patient was advised to lose weight and exercise.  The patient was advised to followup with a nutritionist regarding dieting for the weight loss. The patient cannot lose weight.  The patient is being evaluated with bariatric surgeon.   The patient is being followed for bariatric surgery for obesity with for possible gastric sleeve surgery after delivery.  The patient agrees and will follow-up.  I will try to obtain the prior upper endoscopy to assess results and any contraindications for bariatric surgery.  The patient agreed and will followup.\par H. pylori Gastritis: The patient was found to have H. pylori gastritis on prior upper endoscopy. The patient completed a trial of Clarithromycin 500 mg 2 times a day, Amoxicillin 500mg 2 tablets twice a day and Omeprazole 40 mg twice a day for 14 days for H. pylori eradication.   I recommend an H. pylori breath test to assess for eradication of the bacteria. \par Gastritis: The patient has a history of gastritis noted on prior upper endoscopy. The patient is to avoid nonsteroidal anti-inflammatory drugs and aspirin. I recommend a trial of Famotidine 20 mg twice a day PRN for 3 months for the symptoms. \par Elevated Liver Enzymes and Pregnancy: The patient has elevated liver enzymes noted on prior blood work of unclear etiology. The patient denies any jaundice or pruritus.  The patient denies any alcohol use.  The patient denies taking large doses of nonsteroidal anti-inflammatory drugs or acetaminophen.  I had a long discussion with the patient regarding the elevated liver enzymes and the possible progression of the liver disease during pregnancy.  The patient was told of the possible increased risk of developing liver failure, ascites, renal impairment, ascites, GI bleeding, hepatic encephalopathy, bleeding tendencies and fetal demise.  The patient was told of the importance of follow-up.  The patient was advised to follow up every  month for blood work.  I recommend avoid alcohol and hepato-toxic agents.  I recommend avoiding large doses of nonsteroidal anti-inflammatory drugs or acetaminophen.  I recommend a CBC, SMA 24, amylase, lipase, ESR, TFTs, JUSTYN, rheumatoid factor, celiac sprue panel, IgA, profile for hepatitis A, B, C., AFP, alpha 1 anti-trypsin antibody, ceruloplasmin level, iron, TIBC, ferritin level, AMA, anti-smooth muscle antibody and PT/INR/PTT.  I recommend an abdominal ultrasound  to assess the liver parenchyma and for liver lesions.   The patient agreed and will follow-up to reassess the symptoms.\par Follow-up: The patient is to follow-up in the office in 4 weeks to reassess the symptoms. The patient was told to call the office if any further problems. \par

## 2023-01-09 NOTE — REVIEW OF SYSTEMS
[Feeling Tired] : feeling tired [SOB on Exertion] : shortness of breath during exertion [Abdominal Pain] : abdominal pain [Heartburn] : heartburn [Bloating (gassiness)] : bloating [Itching] : itching [Negative] : Heme/Lymph [FreeTextEntry3] : blurred vision [FreeTextEntry8] : polyuria mocturia [de-identified] : headaches

## 2023-01-09 NOTE — HISTORY OF PRESENT ILLNESS
[FreeTextEntry1] : The patient is a 37-year-old  female with no significant past medical history except for current pregnancy (21 week gestation) being followed by Dr. Shahana Huff and Dr. Danica Conde and morbid obesity who was referred to my office by Dr. Raymundo Hernandez DO for history of H. pylori infection and morbid obesity.  The patient also admits to having abdominal pain, dyspepsia, gastroesophageal reflux disease, dysphagia, atypical chest pain, nausea/vomiting, alternating diarrhea/constipation, change in bowel habits, change in caliber of stool, lower GI bleeding.   The patient also came to the office for evaluation for bariatric surgery with bariatric surgeon for possible gastric sleeve surgery.  I was asked to render an opinion for consultation for the above complaints.   The patient states that she is feeling fine.  The patient has a history of liver disease.  The patient has a history of fatty liver noted on prior imaging study. The patient denies any prior exposure to hepatitis A, B or C.  The patient denies any large doses of nonsteroidal anti-inflammatory drugs or acetaminophen.   The patient denies sharing needles, razors, nail clippers, nail files, scissors, et cetera.  The patient denies any EtOH abuse, cocaine use or intravenous drug use.   The patient denies any prior blood transfusions, sexual indiscretions, tattoos or piercing.  The patient admits to having prior surgery.  The history of surgery is significant for a prior cholecystectomy and  x 2.  The patient admits to a family history of GI and liver problems. The patient's mother had a history of gallbladder disease.  The patient denies any abdominal pain.  The patient complains of abdominal gas and bloating.  The patient denies any nausea or vomiting.  The patient complains of gastroesophageal reflux disease but denies any dysphagia. The gastroesophageal reflux disease is worse after meals in the early morning.  The patient denies taking medications for the gastroesophageal reflux disease. The patient denies any atypical chest pain, shortness of breath or palpitations.  The patient denies any diaphoresis. The patient complains of occasional diarrhea but denies any constipation.  The patient has 3 bowel movements a day. The diarrhea is described as soft in nature.   The patient complains of a change in bowel habits.  The patient complains of a change in caliber of stool.   The patient denies having mucus discharge with the bowel movements.  The patient denies any bright red blood per rectum, melena or hematemesis.  The patient complains of rectal pruritus but denies any rectal pain. The patient denies any weight loss or anorexia.  The patient admits to gaining weight recently. She denies any fevers or chills.  The patient denies any jaundice or pruritus.  The patient complains of chronic lower back pain.  The patient had an upper endoscopy performed by another gastroenterologist, Dr. Eve Lopez on 2022.  The upper endoscopy performed by another gastroenterologist, Dr. Eve Lopez on 2022 revealed a normal esophagus, irregular Z-line at 40 cm from the incisors, gastroesophageal flap valve classified as Hill grade 2 (fold present, opens with respiration), erythematous mucosa in the stomach, mucosal nodule found in the duodenum likely Brunner's gland and normal examined duodenum.  The gastric pathology performed on 2022 revealed gastric antral and oxyntic mucosa with H. pylori associated chronic active gastritis that was positive for Helicobacter pylori and negative for intestinal metaplasia (stomach), duodenal mucosa with mild increase in intraepithelial lymphocytes with otherwise preserved villous architecture and mild increase in intraepithelial lymphocytes (duodenum) and polypoid duodenal mucosa with H. pylori associated chronic active peptic duodenitis that was negative for dysplasia or neoplasia (duodenal nodule).  The patient is s/p treatment for H. pylori eradication but did not have followup testing to assess for eradication.  The blood work performed on 2023 revealed no evidence of anemia with a hemoglobin/hematocrit level of 11.7/38.9, respectively, a low blood glucose of 51 mg/dL, a low creatinine level of 0.47 mg/dL, elevated liver enzymes with an AST/ALT of 84/51/U/L, respectively, a normal alkaline phosphatase of 93/U/L, a normal total bilirubin of 0.2 mg/dL, and elevated LDH of 255/U/L, a positive CMV IgG antibody of >10.00 unit/mL, a negative CMV IgM antibody of <8.0 AU/mL, a negative Toxoplasma IgG of <3.0 IU/mL, a negative Toxoplasma IgM of <3.0 AU/ml, and elevated total bile acid of 12.0 umol/L.  The patient is currently pregnant with gestation x 21 weeks. The patient's last menstrual period was on 2022. The patient's periods are regular at 28 to 30 days.  The patient's menstrual periods are light for 3 to 4 days.  The patient is a .  The patient's first menstrual period was at age 13. The patient denies any significant family history of GI problems.  \par \par (-) smoking, (-) ETOH, (-) IVDA\par  [de-identified] : The upper endoscopy performed by another gastroenterologist, Dr. Eve Lopez on September 6, 2022 revealed a normal esophagus, irregular Z-line at 40 cm from the incisors, gastroesophageal flap valve classified as Hill grade 2 (fold present, opens with respiration), erythematous mucosa in the stomach, mucosal nodule found in the duodenum likely Brunner's gland and normal examined duodenum.  The gastric pathology performed on September 6, 2022 revealed gastric antral and oxyntic mucosa with H. pylori associated chronic active gastritis that was positive for Helicobacter pylori and negative for intestinal metaplasia (stomach), duodenal mucosa with mild increase in intraepithelial lymphocytes with otherwise preserved villous architecture and mild increase in intraepithelial lymphocytes (duodenum) and polypoid duodenal mucosa with H. pylori associated chronic active peptic duodenitis that was negative for dysplasia or neoplasia (duodenal nodule).

## 2023-01-10 ENCOUNTER — NON-APPOINTMENT (OUTPATIENT)
Age: 38
End: 2023-01-10

## 2023-01-11 ENCOUNTER — NON-APPOINTMENT (OUTPATIENT)
Age: 38
End: 2023-01-11

## 2023-01-11 ENCOUNTER — APPOINTMENT (OUTPATIENT)
Dept: OBGYN | Facility: CLINIC | Age: 38
End: 2023-01-11

## 2023-01-11 LAB — BILE AC SER-MCNC: 12 UMOL/L

## 2023-01-12 ENCOUNTER — ASOB RESULT (OUTPATIENT)
Age: 38
End: 2023-01-12

## 2023-01-12 ENCOUNTER — NON-APPOINTMENT (OUTPATIENT)
Age: 38
End: 2023-01-12

## 2023-01-12 ENCOUNTER — APPOINTMENT (OUTPATIENT)
Dept: ANTEPARTUM | Facility: CLINIC | Age: 38
End: 2023-01-12
Payer: MEDICAID

## 2023-01-12 LAB
A1AT SERPL-MCNC: 278 MG/DL
ALBUMIN SERPL ELPH-MCNC: 3.9 G/DL
ALP BLD-CCNC: 87 U/L
ALT SERPL-CCNC: 77 U/L
AMYLASE/CREAT SERPL: 29 U/L
ANA SER IF-ACNC: NEGATIVE
ANION GAP SERPL CALC-SCNC: 15 MMOL/L
APTT BLD: 32.9 SEC
AST SERPL-CCNC: 124 U/L
BASOPHILS # BLD AUTO: 0.05 K/UL
BASOPHILS NFR BLD AUTO: 0.5 %
BILIRUB SERPL-MCNC: 0.2 MG/DL
BUN SERPL-MCNC: 8 MG/DL
CALCIUM SERPL-MCNC: 9.3 MG/DL
CHLORIDE SERPL-SCNC: 102 MMOL/L
CHOLEST SERPL-MCNC: 241 MG/DL
CO2 SERPL-SCNC: 20 MMOL/L
CREAT SERPL-MCNC: 0.55 MG/DL
EGFR: 121 ML/MIN/1.73M2
EOSINOPHIL # BLD AUTO: 0.15 K/UL
EOSINOPHIL NFR BLD AUTO: 1.5 %
ERYTHROCYTE [SEDIMENTATION RATE] IN BLOOD BY WESTERGREN METHOD: 83 MM/HR
FERRITIN SERPL-MCNC: 268 NG/ML
GGT SERPL-CCNC: 29 U/L
GLUCOSE SERPL-MCNC: 92 MG/DL
HBV CORE IGG+IGM SER QL: NONREACTIVE
HBV CORE IGM SER QL: NONREACTIVE
HBV E AB SER QL: NONREACTIVE
HBV E AG SER QL: NONREACTIVE
HBV SURFACE AB SER QL: ABNORMAL
HBV SURFACE AG SER QL: NONREACTIVE
HCT VFR BLD CALC: 39.1 %
HCV AB SER QL: NONREACTIVE
HCV S/CO RATIO: 0.16 S/CO
HDLC SERPL-MCNC: 58 MG/DL
HEPATITIS A IGG ANTIBODY: NONREACTIVE
HEV AB SER QL: NEGATIVE
HGB BLD-MCNC: 11.6 G/DL
IGA SER QL IEP: 178 MG/DL
IMM GRANULOCYTES NFR BLD AUTO: 0.6 %
INR PPP: 0.99 RATIO
IRON SATN MFR SERPL: 19 %
IRON SERPL-MCNC: 98 UG/DL
LDLC SERPL CALC-MCNC: 149 MG/DL
LPL SERPL-CCNC: 19 U/L
LYMPHOCYTES # BLD AUTO: 2.42 K/UL
LYMPHOCYTES NFR BLD AUTO: 23.5 %
MAN DIFF?: NORMAL
MCHC RBC-ENTMCNC: 29.4 PG
MCHC RBC-ENTMCNC: 29.7 GM/DL
MCV RBC AUTO: 99 FL
MITOCHONDRIA AB SER IF-ACNC: NORMAL
MONOCYTES # BLD AUTO: 0.75 K/UL
MONOCYTES NFR BLD AUTO: 7.3 %
NEUTROPHILS # BLD AUTO: 6.85 K/UL
NEUTROPHILS NFR BLD AUTO: 66.6 %
NONHDLC SERPL-MCNC: 184 MG/DL
PLATELET # BLD AUTO: 348 K/UL
POTASSIUM SERPL-SCNC: 4.5 MMOL/L
PROT SERPL-MCNC: 6.7 G/DL
PT BLD: 11.5 SEC
RBC # BLD: 3.95 M/UL
RBC # FLD: 15.6 %
RHEUMATOID FACT SER QL: <10 IU/ML
SMOOTH MUSCLE AB SER QL IF: ABNORMAL
SODIUM SERPL-SCNC: 138 MMOL/L
TIBC SERPL-MCNC: 511 UG/DL
TRIGL SERPL-MCNC: 174 MG/DL
TSH SERPL-ACNC: 2.36 UIU/ML
UIBC SERPL-MCNC: 413 UG/DL
UREA BREATH TEST QL: POSITIVE
WBC # FLD AUTO: 10.28 K/UL

## 2023-01-12 PROCEDURE — 76816 OB US FOLLOW-UP PER FETUS: CPT

## 2023-01-13 LAB
GLIADIN IGA SER QL: 44.6 UNITS
GLIADIN IGG SER QL: <5 UNITS
GLIADIN PEPTIDE IGA SER-ACNC: POSITIVE
GLIADIN PEPTIDE IGG SER-ACNC: NEGATIVE
TTG IGA SER IA-ACNC: <1.2 U/ML
TTG IGA SER-ACNC: NEGATIVE
TTG IGG SER IA-ACNC: 1.3 U/ML
TTG IGG SER IA-ACNC: NEGATIVE

## 2023-01-16 ENCOUNTER — NON-APPOINTMENT (OUTPATIENT)
Age: 38
End: 2023-01-16

## 2023-01-16 LAB — HDV AB SER IA-ACNC: NEGATIVE

## 2023-01-17 ENCOUNTER — OUTPATIENT (OUTPATIENT)
Dept: OUTPATIENT SERVICES | Facility: HOSPITAL | Age: 38
LOS: 1 days | End: 2023-01-17

## 2023-01-17 ENCOUNTER — RESULT REVIEW (OUTPATIENT)
Age: 38
End: 2023-01-17

## 2023-01-17 ENCOUNTER — MED ADMIN CHARGE (OUTPATIENT)
Age: 38
End: 2023-01-17

## 2023-01-17 ENCOUNTER — NON-APPOINTMENT (OUTPATIENT)
Age: 38
End: 2023-01-17

## 2023-01-17 ENCOUNTER — APPOINTMENT (OUTPATIENT)
Dept: OBGYN | Facility: HOSPITAL | Age: 38
End: 2023-01-17
Payer: MEDICAID

## 2023-01-17 VITALS
SYSTOLIC BLOOD PRESSURE: 121 MMHG | HEIGHT: 62 IN | HEART RATE: 100 BPM | TEMPERATURE: 98 F | DIASTOLIC BLOOD PRESSURE: 82 MMHG | WEIGHT: 293 LBS | BODY MASS INDEX: 53.92 KG/M2

## 2023-01-17 DIAGNOSIS — Z90.49 ACQUIRED ABSENCE OF OTHER SPECIFIED PARTS OF DIGESTIVE TRACT: Chronic | ICD-10-CM

## 2023-01-17 LAB
24R-OH-CALCIDIOL SERPL-MCNC: 27.3 NG/ML — LOW (ref 30–80)
ALBUMIN SERPL ELPH-MCNC: 3.7 G/DL — SIGNIFICANT CHANGE UP (ref 3.3–5)
ALP SERPL-CCNC: 76 U/L — SIGNIFICANT CHANGE UP (ref 40–120)
ALT FLD-CCNC: 76 U/L — HIGH (ref 4–33)
ANION GAP SERPL CALC-SCNC: 12 MMOL/L — SIGNIFICANT CHANGE UP (ref 7–14)
AST SERPL-CCNC: 126 U/L — HIGH (ref 4–32)
BILIRUB SERPL-MCNC: 0.3 MG/DL — SIGNIFICANT CHANGE UP (ref 0.2–1.2)
BUN SERPL-MCNC: 8 MG/DL — SIGNIFICANT CHANGE UP (ref 7–23)
CALCIUM SERPL-MCNC: 9.5 MG/DL — SIGNIFICANT CHANGE UP (ref 8.4–10.5)
CHLORIDE SERPL-SCNC: 101 MMOL/L — SIGNIFICANT CHANGE UP (ref 98–107)
CO2 SERPL-SCNC: 25 MMOL/L — SIGNIFICANT CHANGE UP (ref 22–31)
CREAT SERPL-MCNC: 0.5 MG/DL — SIGNIFICANT CHANGE UP (ref 0.5–1.3)
EGFR: 124 ML/MIN/1.73M2 — SIGNIFICANT CHANGE UP
GLUCOSE SERPL-MCNC: 81 MG/DL — SIGNIFICANT CHANGE UP (ref 70–99)
POTASSIUM SERPL-MCNC: 4 MMOL/L — SIGNIFICANT CHANGE UP (ref 3.5–5.3)
POTASSIUM SERPL-SCNC: 4 MMOL/L — SIGNIFICANT CHANGE UP (ref 3.5–5.3)
PROT SERPL-MCNC: 7.3 G/DL — SIGNIFICANT CHANGE UP (ref 6–8.3)
SODIUM SERPL-SCNC: 138 MMOL/L — SIGNIFICANT CHANGE UP (ref 135–145)

## 2023-01-17 PROCEDURE — 99214 OFFICE O/P EST MOD 30 MIN: CPT | Mod: 25,GC

## 2023-01-17 PROCEDURE — 36415 COLL VENOUS BLD VENIPUNCTURE: CPT | Mod: NC,GC

## 2023-01-17 RX ORDER — SIMETHICONE 80 MG/1
80 TABLET, CHEWABLE ORAL
Qty: 30 | Refills: 3 | Status: COMPLETED | COMMUNITY
Start: 2023-01-17 | End: 2023-02-18

## 2023-01-18 DIAGNOSIS — K76.0 FATTY (CHANGE OF) LIVER, NOT ELSEWHERE CLASSIFIED: ICD-10-CM

## 2023-01-18 DIAGNOSIS — E66.01 MORBID (SEVERE) OBESITY DUE TO EXCESS CALORIES: ICD-10-CM

## 2023-01-18 DIAGNOSIS — K21.9 GASTRO-ESOPHAGEAL REFLUX DISEASE WITHOUT ESOPHAGITIS: ICD-10-CM

## 2023-01-18 DIAGNOSIS — A04.8 OTHER SPECIFIED BACTERIAL INTESTINAL INFECTIONS: ICD-10-CM

## 2023-01-18 DIAGNOSIS — O09.522 SUPERVISION OF ELDERLY MULTIGRAVIDA, SECOND TRIMESTER: ICD-10-CM

## 2023-01-18 DIAGNOSIS — O09.40 SUPERVISION OF PREGNANCY WITH GRAND MULTIPARITY, UNSPECIFIED TRIMESTER: ICD-10-CM

## 2023-01-18 DIAGNOSIS — O34.219 MATERNAL CARE FOR UNSPECIFIED TYPE SCAR FROM PREVIOUS CESAREAN DELIVERY: ICD-10-CM

## 2023-01-18 DIAGNOSIS — Z34.92 ENCOUNTER FOR SUPERVISION OF NORMAL PREGNANCY, UNSPECIFIED, SECOND TRIMESTER: ICD-10-CM

## 2023-01-18 DIAGNOSIS — K83.1 OBSTRUCTION OF BILE DUCT: ICD-10-CM

## 2023-01-18 DIAGNOSIS — Z34.90 ENCOUNTER FOR SUPERVISION OF NORMAL PREGNANCY, UNSPECIFIED, UNSPECIFIED TRIMESTER: ICD-10-CM

## 2023-01-18 DIAGNOSIS — Z23 ENCOUNTER FOR IMMUNIZATION: ICD-10-CM

## 2023-01-18 DIAGNOSIS — O28.9 UNSPECIFIED ABNORMAL FINDINGS ON ANTENATAL SCREENING OF MOTHER: ICD-10-CM

## 2023-01-23 ENCOUNTER — APPOINTMENT (OUTPATIENT)
Dept: CARDIOLOGY | Facility: CLINIC | Age: 38
End: 2023-01-23
Payer: MEDICAID

## 2023-01-23 ENCOUNTER — NON-APPOINTMENT (OUTPATIENT)
Age: 38
End: 2023-01-23

## 2023-01-23 VITALS
SYSTOLIC BLOOD PRESSURE: 132 MMHG | OXYGEN SATURATION: 99 % | TEMPERATURE: 97.1 F | HEIGHT: 62 IN | WEIGHT: 293 LBS | BODY MASS INDEX: 53.92 KG/M2 | DIASTOLIC BLOOD PRESSURE: 77 MMHG | HEART RATE: 107 BPM

## 2023-01-23 PROCEDURE — 99214 OFFICE O/P EST MOD 30 MIN: CPT | Mod: 25

## 2023-01-23 PROCEDURE — 93000 ELECTROCARDIOGRAM COMPLETE: CPT

## 2023-01-25 ENCOUNTER — NON-APPOINTMENT (OUTPATIENT)
Age: 38
End: 2023-01-25

## 2023-01-25 ENCOUNTER — APPOINTMENT (OUTPATIENT)
Dept: PEDIATRIC CARDIOLOGY | Facility: CLINIC | Age: 38
End: 2023-01-25
Payer: MEDICAID

## 2023-01-25 LAB
ALBUMIN SERPL ELPH-MCNC: 3.8 G/DL
ALP BLD-CCNC: 84 U/L
ALT SERPL-CCNC: 72 U/L
AMYLASE/CREAT SERPL: 35 U/L
ANION GAP SERPL CALC-SCNC: 15 MMOL/L
AST SERPL-CCNC: 99 U/L
BASOPHILS # BLD AUTO: 0.02 K/UL
BASOPHILS NFR BLD AUTO: 0.2 %
BILIRUB SERPL-MCNC: 0.2 MG/DL
BUN SERPL-MCNC: 12 MG/DL
CALCIUM SERPL-MCNC: 9.7 MG/DL
CHLORIDE SERPL-SCNC: 104 MMOL/L
CO2 SERPL-SCNC: 21 MMOL/L
CREAT SERPL-MCNC: 0.64 MG/DL
EGFR: 117 ML/MIN/1.73M2
EOSINOPHIL # BLD AUTO: 0.1 K/UL
EOSINOPHIL NFR BLD AUTO: 1.2 %
ERYTHROCYTE [SEDIMENTATION RATE] IN BLOOD BY WESTERGREN METHOD: 80 MM/HR
GGT SERPL-CCNC: 21 U/L
GLUCOSE SERPL-MCNC: 108 MG/DL
HCT VFR BLD CALC: 42.2 %
HEV AB SER QL: NEGATIVE
HGB BLD-MCNC: 12.4 G/DL
IMM GRANULOCYTES NFR BLD AUTO: 0.4 %
INR PPP: 1.06 RATIO
LPL SERPL-CCNC: 38 U/L
LYMPHOCYTES # BLD AUTO: 1.68 K/UL
LYMPHOCYTES NFR BLD AUTO: 20.4 %
MAN DIFF?: NORMAL
MCHC RBC-ENTMCNC: 29 PG
MCHC RBC-ENTMCNC: 29.4 GM/DL
MCV RBC AUTO: 98.6 FL
MONOCYTES # BLD AUTO: 0.61 K/UL
MONOCYTES NFR BLD AUTO: 7.4 %
NEUTROPHILS # BLD AUTO: 5.8 K/UL
NEUTROPHILS NFR BLD AUTO: 70.4 %
PLATELET # BLD AUTO: 347 K/UL
POTASSIUM SERPL-SCNC: 4.3 MMOL/L
PROT SERPL-MCNC: 6.8 G/DL
PT BLD: 12.3 SEC
RBC # BLD: 4.28 M/UL
RBC # FLD: 15.8 %
SODIUM SERPL-SCNC: 139 MMOL/L
WBC # FLD AUTO: 8.24 K/UL

## 2023-01-25 PROCEDURE — 93325 DOPPLER ECHO COLOR FLOW MAPG: CPT | Mod: 59

## 2023-01-25 PROCEDURE — 76821 MIDDLE CEREBRAL ARTERY ECHO: CPT

## 2023-01-25 PROCEDURE — 76825 ECHO EXAM OF FETAL HEART: CPT

## 2023-01-25 PROCEDURE — 76827 ECHO EXAM OF FETAL HEART: CPT

## 2023-01-25 PROCEDURE — 99203 OFFICE O/P NEW LOW 30 MIN: CPT

## 2023-01-25 PROCEDURE — 76820 UMBILICAL ARTERY ECHO: CPT

## 2023-01-26 ENCOUNTER — APPOINTMENT (OUTPATIENT)
Dept: ANTEPARTUM | Facility: CLINIC | Age: 38
End: 2023-01-26
Payer: MEDICAID

## 2023-01-26 ENCOUNTER — ASOB RESULT (OUTPATIENT)
Age: 38
End: 2023-01-26

## 2023-01-26 VITALS
DIASTOLIC BLOOD PRESSURE: 69 MMHG | WEIGHT: 293 LBS | HEIGHT: 62 IN | SYSTOLIC BLOOD PRESSURE: 106 MMHG | HEART RATE: 94 BPM | BODY MASS INDEX: 53.92 KG/M2

## 2023-01-26 PROCEDURE — 76816 OB US FOLLOW-UP PER FETUS: CPT

## 2023-01-26 PROCEDURE — 99214 OFFICE O/P EST MOD 30 MIN: CPT

## 2023-01-31 ENCOUNTER — NON-APPOINTMENT (OUTPATIENT)
Age: 38
End: 2023-01-31

## 2023-02-10 ENCOUNTER — APPOINTMENT (OUTPATIENT)
Dept: GASTROENTEROLOGY | Facility: CLINIC | Age: 38
End: 2023-02-10
Payer: MEDICAID

## 2023-02-10 VITALS
DIASTOLIC BLOOD PRESSURE: 82 MMHG | OXYGEN SATURATION: 97 % | WEIGHT: 293 LBS | TEMPERATURE: 98.3 F | BODY MASS INDEX: 53.92 KG/M2 | SYSTOLIC BLOOD PRESSURE: 136 MMHG | HEIGHT: 62 IN | HEART RATE: 106 BPM

## 2023-02-10 PROCEDURE — 99214 OFFICE O/P EST MOD 30 MIN: CPT

## 2023-02-10 NOTE — ASSESSMENT
[FreeTextEntry1] : Abdominal Pain: The patient complains of abdominal pain. The patient is to avoid nonsteroidal anti-inflammatory drugs and aspirin.  I recommend a trial of Phazyme 1 tablet PO 3 times a day for 3 months for the symptoms.\par Dyspepsia: The patient complains of dyspeptic symptoms.  The patient was advised to continue to abide by an anti-gas (low FOD-MAP) diet.  The patient was previously given a pamphlet for anti-gas (low FOD-MAP).  The patient and I reviewed the anti-gas (low FOD-MAP)diet at length again. The patient is to continue on a trial of Simethicone one tablet 4 times a day p.r.n. abdominal pain and gas.\par GERD: The patient was advised to avoid late-night meals and dietary indiscretions.  The patient was advised to avoid fried and fatty foods.  The patient was advised to abide by an anti-GERD diet. The patient was given a pamphlet for anti-GERD.  The patient and I reviewed the anti-GERD diet at length. I recommend a trial of famotidine 20 mg twice a day x 3 months for the symptoms.\par Obesity: The patient is morbidly obese. The patient was advised to lose weight and exercise. The patient was advised to followup with a nutritionist regarding dieting for the weight loss. The patient cannot lose weight. The patient is being evaluated with bariatric surgeon. The patient is being followed for bariatric surgery for obesity with for possible gastric sleeve surgery after delivery. The patient agrees and will follow-up. I will try to obtain the prior upper endoscopy to assess results and any contraindications for bariatric surgery. The patient agreed and will followup.\par H. pylori Gastritis: The patient was found to have H. pylori gastritis on prior upper endoscopy. The patient completed a trial of Clarithromycin 500 mg 2 times a day, Amoxicillin 500mg 2 tablets twice a day and Omeprazole 40 mg twice a day for 14 days for H. pylori eradication. The patient is s/p treatment for H. pylori eradication but did not have followup testing to assess for eradication.  The H. pylori breath test performed on January 9, 2023 was detected. I recommend a trial of antibiotics for H. pylori eradication after delivery.  I recommend an H. pylori breath test to assess for eradication of the bacteria. \par Gastritis: The patient has a history of gastritis noted on prior upper endoscopy. The patient is to avoid nonsteroidal anti-inflammatory drugs and aspirin. I recommend a trial of Famotidine 20 mg twice a day PRN for 3 months for the symptoms. \par Elevated Liver Enzymes and Pregnancy: The patient has elevated liver enzymes noted on prior blood work of unclear etiology. The patient denies any jaundice or pruritus. The patient denies any alcohol use. The patient denies taking large doses of nonsteroidal anti-inflammatory drugs or acetaminophen. I had a long discussion with the patient regarding the elevated liver enzymes and the possible progression of the liver disease during pregnancy. The patient was told of the possible increased risk of developing liver failure, ascites, renal impairment, ascites, GI bleeding, hepatic encephalopathy, bleeding tendencies and fetal demise. The patient was told of the importance of follow-up. The patient was advised to follow up every month for blood work. I recommend avoid alcohol and hepato-toxic agents. I recommend avoiding large doses of nonsteroidal anti-inflammatory drugs or acetaminophen. The patient agreed and will follow-up to reassess the symptoms. I recommend evaluation with hepatologist, Dr. Jeffrey Hoang for the elevated liver enzymes in pregnancy.\par Fatty Liver:  The patient had an imaging study suggestive of fatty infiltration of the liver.  The patient denies any jaundice or pruritus.  The patient denies any alcohol use.  The patient denies taking large doses of nonsteroidal anti-inflammatory drugs or acetaminophen.  The findings are suggestive of fatty liver.  The patient and I had a long discussion regarding the risks of fatty liver and possible progression to cirrhosis.  The patient was told of the possible increased risk of developing liver failure, cirrhosis, ascites, GI bleeding secondary to varices, hepatic encephalopathy, bleeding tendencies and liver cancer.  The patient was told of the importance of follow-up.  The patient was advised to follow up every 6 months for blood work and imaging studies. The patient agreed and will follow up. The patient was advised to lose weight and exercise.   The patient was advised to abide by a Mediterranean diet.   If the liver enzymes remain elevated, the patient may require a trial of Pioglitazone for the fatty liver. I recommend avoid alcohol and hepato-toxic agents.  The patient was also advised to avoid NSAIDs, Acetaminophen and any other hepatotoxic drugs. The patient was also advised not to share needles, razors, scissors, nail clippers, etc.. The patient is to continue close follow-up in our office for blood work and exams.  If the liver enzymes remain elevated, the patient may require a CT guided liver biopsy to assess the liver parenchyma for possible treatment.  We had a long discussion regarding the risks and benefits of the procedure.  The patient was told of the risks of bleeding, perforation, infections, emergency surgery and missing lesions.  The patient agreed and will follow-up to reassess the symptoms.  \par Blood Work: I recommend blood work to assess the patient's symptoms. I recommend a CBC, SMA 24, amylase, lipase, ESR, ,iron, TIBC, ferritin level and fibrinogen.  I also recommend obtaining the recent blood work performed by the patient's PMD.\par Follow-up: The patient is to follow-up in the office in 4 weeks to reassess the symptoms. The patient was told to call the office if any further problems. \par \par \par

## 2023-02-10 NOTE — HISTORY OF PRESENT ILLNESS
[FreeTextEntry1] : The patient has a history of pregnancy (26-week gestation).  The patient is being followed by Dr. Shahana Huff and Dr. Danica Conde and morbid obesity and by Dr. Raymundo Hernandez DO. The patient has a history of liver disease. The patient has a history of fatty liver noted on prior imaging study. The patient denies any prior exposure to hepatitis A, B or C. The patient denies any large doses of nonsteroidal anti-inflammatory drugs or acetaminophen. The patient denies sharing needles, razors, nail clippers, nail files, scissors, et cetera. The patient denies any EtOH abuse, cocaine use or intravenous drug use. The patient denies any prior blood transfusions, sexual indiscretions, tattoos or piercing. The patient admits to having prior surgery. The history of surgery is significant for a prior cholecystectomy and  x 2. The patient admits to a family history of GI and liver problems. The patient's mother had a history of gallbladder disease. The patient states that she is feeling uncomfortable. The patient denies any jaundice or pruritus.  The patient denies any chronic lower back pain. The patient complains of abdominal pain.  The patient describes the abdominal pain as a burning, intermittent upper abdominal discomfort that is nonradiating in nature.  The abdominal pain is unrelated to meals.  The abdominal pain improves with passing gas or having a bowel movement.  The abdominal pain is described as mild in nature.  The abdominal pain occurs at night and in the morning.  The abdominal pain can occur at any time.   The abdominal pain has never awakened the patient from sleep.  The abdominal pain is relieved with certain medication such as proton pump inhibitors, H2 blockers and antacids.  The abdominal pain is associated with abdominal gas and bloating.  The patient denies any nausea or vomiting.  The patient complains of occasional gastroesophageal reflux disease but denies any dysphagia.  The gastroesophageal reflux disease is worse after meals and late at night and in the early morning. The gastroesophageal reflux disease is improved with proton pump inhibitors, H2 blockers and antacids.   The patient denies any atypical chest pain, shortness of breath or palpitations.  The patient denies any diaphoresis. The patient denies any constipation or diarrhea.  The patient has 3 bowel movements a day. The patient denies a change in bowel habits.  The patient denies a change in caliber of stool.  The patient denies having mucus discharge with the bowel movements.  The patient denies any bright red blood per rectum, melena or hematemesis.  The patient complains of rectal pruritus but denies any rectal pain.  The patient denies any weight loss or anorexia.  The patient admits to gaining weight secondary to pregnancy.  She denies any fevers or chills.  The patient had an upper endoscopy performed by another gastroenterologist, Dr. Eve Lopez on 2022. The upper endoscopy performed by another gastroenterologist, Dr. Eve Lopez on 2022 revealed a normal esophagus, irregular Z-line at 40 cm from the incisors, gastroesophageal flap valve classified as Hill grade 2 (fold present, opens with respiration), erythematous mucosa in the stomach, mucosal nodule found in the duodenum likely Brunner's gland and normal examined duodenum. The gastric pathology performed on 2022 revealed gastric antral and oxyntic mucosa with H. pylori associated chronic active gastritis that was positive for Helicobacter pylori and negative for intestinal metaplasia (stomach), duodenal mucosa with mild increase in intraepithelial lymphocytes with otherwise preserved villous architecture and mild increase in intraepithelial lymphocytes (duodenum) and polypoid duodenal mucosa with H. pylori associated chronic active peptic duodenitis that was negative for dysplasia or neoplasia (duodenal nodule). The patient is s/p treatment for H. pylori eradication but did not have followup testing to assess for eradication.  The H. pylori breath test performed on 2023 was detected. I recommend a trial of antibiotics for H. pylori eradication after delivery. The abdominal ultrasound performed on 2022 revealed heterogeneous liver which may reflect steatosis.  The blood work performed on 2023 revealed a normal amylase/lipase level of 35/38 U/L, respectively, no evidence of anemia with a hemoglobin/hematocrit level of 12.4/42.2 a low CO2 of 21 mmol/L, and elevated blood glucose of 108 mg/dL, elevated liver enzymes with an AST/ALT of 99/72/U/L, respectively, a normal total bilirubin of 0.2 mg/dL, a normal alkaline phosphatase/GGTP of 84/21 U/L, respectively, a normal PT/INR of 12.3/1.06 and an elevated ESR of 88 mm/h. The blood work performed on 2023 revealed a normal PTT/INR/PTT of 11.5/0.99/32.9 seconds, a normal amylase/lipase level of 29/19 U/L, respectively, no evidence of anemia with a hemoglobin/hematocrit level of 11.6/39.1, respectively, and elevated alpha 1 antitrypsin of 278 mg/dL, a low CO2 of 20 mmol/L, elevated liver enzymes with an AST/ALT of 124/77/U/L, respectively, a normal total bilirubin of 0.2 mg/dL, a normal alkaline phosphatase/GGTP of 87/29 U/L, a normal serum iron level of 98 mcg/dL, and elevated TIBC/U IBC of 511/413 mcg/dL, respectively, a normal iron percent saturation of 19%, an elevated ferritin level of 268 ng/mL, and elevated total cholesterol/triglyceride level of 241/174 mg/dL, respectively, a normal rheumatoid factor <10 IU/mL, a normal TSH of 2.36u IU/mL, and an elevated ESR of 83 mm/h.  The blood work performed on 2023 revealed no evidence of anemia with a hemoglobin/hematocrit level of 11.7/38.9, respectively, a low blood glucose of 51 mg/dL, a low creatinine level of 0.47 mg/dL, elevated liver enzymes with an AST/ALT of 84/51/U/L, respectively, a normal alkaline phosphatase of 93/U/L, a normal total bilirubin of 0.2 mg/dL, and elevated LDH of 255/U/L, a positive CMV IgG antibody of >10.00 unit/mL, a negative CMV IgM antibody of <8.0 AU/mL, a negative Toxoplasma IgG of <3.0 IU/mL, a negative Toxoplasma IgM of <3.0 AU/ml, and elevated total bile acid of 12.0 umol/L. The patient is currently pregnant with gestation x 21 weeks. The patient's last menstrual period was on 2022. The patient's periods are regular at 28 to 30 days. The patient's menstrual periods are light for 3 to 4 days. The patient is a . The patient's first menstrual period was at age 13. The patient denies any significant family history of GI problems. \par \par (-) smoking, (-) ETOH, (-) IVDA\par  [de-identified] : The upper endoscopy performed by another gastroenterologist, Dr. Eve Lopez on September 6, 2022 revealed a normal esophagus, irregular Z-line at 40 cm from the incisors, gastroesophageal flap valve classified as Hill grade 2 (fold present, opens with respiration), erythematous mucosa in the stomach, mucosal nodule found in the duodenum likely Brunner's gland and normal examined duodenum.  The gastric pathology performed on September 6, 2022 revealed gastric antral and oxyntic mucosa with H. pylori associated chronic active gastritis that was positive for Helicobacter pylori and negative for intestinal metaplasia (stomach), duodenal mucosa with mild increase in intraepithelial lymphocytes with otherwise preserved villous architecture and mild increase in intraepithelial lymphocytes (duodenum) and polypoid duodenal mucosa with H. pylori associated chronic active peptic duodenitis that was negative for dysplasia or neoplasia (duodenal nodule). [de-identified] : The abdominal ultrasound performed on December 8, 2022 revealed heterogeneous liver which may reflect steatosis.

## 2023-02-13 ENCOUNTER — APPOINTMENT (OUTPATIENT)
Dept: OBGYN | Facility: HOSPITAL | Age: 38
End: 2023-02-13
Payer: MEDICAID

## 2023-02-13 ENCOUNTER — RESULT REVIEW (OUTPATIENT)
Age: 38
End: 2023-02-13

## 2023-02-13 ENCOUNTER — NON-APPOINTMENT (OUTPATIENT)
Age: 38
End: 2023-02-13

## 2023-02-13 ENCOUNTER — OUTPATIENT (OUTPATIENT)
Dept: OUTPATIENT SERVICES | Facility: HOSPITAL | Age: 38
LOS: 1 days | End: 2023-02-13

## 2023-02-13 VITALS
HEIGHT: 61 IN | DIASTOLIC BLOOD PRESSURE: 57 MMHG | TEMPERATURE: 98 F | HEART RATE: 100 BPM | BODY MASS INDEX: 55.32 KG/M2 | WEIGHT: 293 LBS | SYSTOLIC BLOOD PRESSURE: 98 MMHG

## 2023-02-13 DIAGNOSIS — O09.40 SUPERVISION OF PREGNANCY WITH GRAND MULTIPARITY, UNSPECIFIED TRIMESTER: ICD-10-CM

## 2023-02-13 DIAGNOSIS — A04.8 OTHER SPECIFIED BACTERIAL INTESTINAL INFECTIONS: ICD-10-CM

## 2023-02-13 DIAGNOSIS — R79.89 OTHER SPECIFIED ABNORMAL FINDINGS OF BLOOD CHEMISTRY: ICD-10-CM

## 2023-02-13 DIAGNOSIS — Z90.49 ACQUIRED ABSENCE OF OTHER SPECIFIED PARTS OF DIGESTIVE TRACT: Chronic | ICD-10-CM

## 2023-02-13 DIAGNOSIS — Z3A.26 26 WEEKS GESTATION OF PREGNANCY: ICD-10-CM

## 2023-02-13 DIAGNOSIS — E66.01 MORBID (SEVERE) OBESITY DUE TO EXCESS CALORIES: ICD-10-CM

## 2023-02-13 DIAGNOSIS — K76.0 FATTY (CHANGE OF) LIVER, NOT ELSEWHERE CLASSIFIED: ICD-10-CM

## 2023-02-13 DIAGNOSIS — O26.612 LIVER AND BILIARY TRACT DISORDERS IN PREGNANCY, SECOND TRIMESTER: ICD-10-CM

## 2023-02-13 DIAGNOSIS — R80.9 PROTEINURIA, UNSPECIFIED: ICD-10-CM

## 2023-02-13 DIAGNOSIS — K21.9 GASTRO-ESOPHAGEAL REFLUX DISEASE WITHOUT ESOPHAGITIS: ICD-10-CM

## 2023-02-13 LAB
ALBUMIN SERPL ELPH-MCNC: 3.5 G/DL
ALBUMIN SERPL ELPH-MCNC: 3.7 G/DL — SIGNIFICANT CHANGE UP (ref 3.3–5)
ALP BLD-CCNC: 89 U/L
ALP SERPL-CCNC: 90 U/L — SIGNIFICANT CHANGE UP (ref 40–120)
ALT FLD-CCNC: 58 U/L — HIGH (ref 4–33)
ALT SERPL-CCNC: 54 U/L
AMYLASE/CREAT SERPL: 33 U/L
ANION GAP SERPL CALC-SCNC: 12 MMOL/L — SIGNIFICANT CHANGE UP (ref 7–14)
ANION GAP SERPL CALC-SCNC: 13 MMOL/L
APTT BLD: 30.3 SEC
AST SERPL-CCNC: 68 U/L
AST SERPL-CCNC: 96 U/L — HIGH (ref 4–32)
BASOPHILS # BLD AUTO: 0.04 K/UL
BASOPHILS NFR BLD AUTO: 0.5 %
BILIRUB SERPL-MCNC: 0.2 MG/DL
BILIRUB SERPL-MCNC: 0.2 MG/DL — SIGNIFICANT CHANGE UP (ref 0.2–1.2)
BUN SERPL-MCNC: 10 MG/DL — SIGNIFICANT CHANGE UP (ref 7–23)
BUN SERPL-MCNC: 9 MG/DL
CALCIUM SERPL-MCNC: 9.3 MG/DL — SIGNIFICANT CHANGE UP (ref 8.4–10.5)
CALCIUM SERPL-MCNC: 9.5 MG/DL
CHLORIDE SERPL-SCNC: 103 MMOL/L — SIGNIFICANT CHANGE UP (ref 98–107)
CHLORIDE SERPL-SCNC: 104 MMOL/L
CO2 SERPL-SCNC: 22 MMOL/L
CO2 SERPL-SCNC: 24 MMOL/L — SIGNIFICANT CHANGE UP (ref 22–31)
CREAT SERPL-MCNC: 0.49 MG/DL — LOW (ref 0.5–1.3)
CREAT SERPL-MCNC: 0.52 MG/DL
EGFR: 123 ML/MIN/1.73M2
EGFR: 124 ML/MIN/1.73M2 — SIGNIFICANT CHANGE UP
EOSINOPHIL # BLD AUTO: 0.08 K/UL
EOSINOPHIL NFR BLD AUTO: 1 %
ERYTHROCYTE [SEDIMENTATION RATE] IN BLOOD BY WESTERGREN METHOD: 55 MM/HR
FERRITIN SERPL-MCNC: 91 NG/ML
GESTATIONAL GTT PNL UR+SERPL: 84 MG/DL — SIGNIFICANT CHANGE UP (ref 70–94)
GGT SERPL-CCNC: 21 U/L
GLUCOSE 1H P CHAL SERPL-MCNC: 151 MG/DL — SIGNIFICANT CHANGE UP (ref 70–179)
GLUCOSE SERPL-MCNC: 154 MG/DL — HIGH (ref 70–99)
GLUCOSE SERPL-MCNC: 98 MG/DL
GTT GEST 2H PNL UR+SERPL: 122 MG/DL — SIGNIFICANT CHANGE UP (ref 70–154)
GTT GEST 3H PNL SERPL: 100 MG/DL — SIGNIFICANT CHANGE UP (ref 70–139)
HCT VFR BLD CALC: 35.2 %
HGB BLD-MCNC: 11.1 G/DL
IMM GRANULOCYTES NFR BLD AUTO: 0.7 %
INR PPP: 1 RATIO
LPL SERPL-CCNC: 23 U/L
LYMPHOCYTES # BLD AUTO: 2 K/UL
LYMPHOCYTES NFR BLD AUTO: 24 %
MAN DIFF?: NORMAL
MCHC RBC-ENTMCNC: 29 PG
MCHC RBC-ENTMCNC: 31.5 GM/DL
MCV RBC AUTO: 91.9 FL
MONOCYTES # BLD AUTO: 0.65 K/UL
MONOCYTES NFR BLD AUTO: 7.8 %
NEUTROPHILS # BLD AUTO: 5.49 K/UL
NEUTROPHILS NFR BLD AUTO: 66 %
PLATELET # BLD AUTO: 319 K/UL
POTASSIUM SERPL-MCNC: 3.9 MMOL/L — SIGNIFICANT CHANGE UP (ref 3.5–5.3)
POTASSIUM SERPL-SCNC: 3.9 MMOL/L — SIGNIFICANT CHANGE UP (ref 3.5–5.3)
POTASSIUM SERPL-SCNC: 4.6 MMOL/L
PROT SERPL-MCNC: 6.1 G/DL
PROT SERPL-MCNC: 7.1 G/DL — SIGNIFICANT CHANGE UP (ref 6–8.3)
PT BLD: 11.8 SEC
RBC # BLD: 3.83 M/UL
RBC # FLD: 14.9 %
SODIUM SERPL-SCNC: 139 MMOL/L — SIGNIFICANT CHANGE UP (ref 135–145)
SODIUM SERPL-SCNC: 140 MMOL/L
WBC # FLD AUTO: 8.32 K/UL

## 2023-02-13 PROCEDURE — 36415 COLL VENOUS BLD VENIPUNCTURE: CPT | Mod: NC,GC

## 2023-02-13 PROCEDURE — 99213 OFFICE O/P EST LOW 20 MIN: CPT | Mod: 25,GC

## 2023-02-14 LAB
CULTURE RESULTS: SIGNIFICANT CHANGE UP
FIBRINOGEN AG PPP IA-MCNC: 676 MG/DL
SPECIMEN SOURCE: SIGNIFICANT CHANGE UP

## 2023-02-23 ENCOUNTER — APPOINTMENT (OUTPATIENT)
Dept: ANTEPARTUM | Facility: CLINIC | Age: 38
End: 2023-02-23

## 2023-02-27 ENCOUNTER — APPOINTMENT (OUTPATIENT)
Dept: MATERNAL FETAL MEDICINE | Facility: HOSPITAL | Age: 38
End: 2023-02-27
Payer: MEDICAID

## 2023-02-27 ENCOUNTER — RESULT REVIEW (OUTPATIENT)
Age: 38
End: 2023-02-27

## 2023-02-27 ENCOUNTER — ASOB RESULT (OUTPATIENT)
Age: 38
End: 2023-02-27

## 2023-02-27 ENCOUNTER — APPOINTMENT (OUTPATIENT)
Dept: OBGYN | Facility: HOSPITAL | Age: 38
End: 2023-02-27
Payer: MEDICAID

## 2023-02-27 ENCOUNTER — OUTPATIENT (OUTPATIENT)
Dept: OUTPATIENT SERVICES | Facility: HOSPITAL | Age: 38
LOS: 1 days | End: 2023-02-27

## 2023-02-27 VITALS
HEIGHT: 61 IN | DIASTOLIC BLOOD PRESSURE: 73 MMHG | BODY MASS INDEX: 55.32 KG/M2 | SYSTOLIC BLOOD PRESSURE: 110 MMHG | TEMPERATURE: 97.2 F | WEIGHT: 293 LBS | HEART RATE: 96 BPM

## 2023-02-27 DIAGNOSIS — Z90.49 ACQUIRED ABSENCE OF OTHER SPECIFIED PARTS OF DIGESTIVE TRACT: Chronic | ICD-10-CM

## 2023-02-27 LAB
ALBUMIN SERPL ELPH-MCNC: 3.6 G/DL — SIGNIFICANT CHANGE UP (ref 3.3–5)
ALP SERPL-CCNC: 97 U/L — SIGNIFICANT CHANGE UP (ref 40–120)
ALT FLD-CCNC: 61 U/L — HIGH (ref 4–33)
ANION GAP SERPL CALC-SCNC: 9 MMOL/L — SIGNIFICANT CHANGE UP (ref 7–14)
AST SERPL-CCNC: 112 U/L — HIGH (ref 4–32)
BILIRUB SERPL-MCNC: <0.2 MG/DL — SIGNIFICANT CHANGE UP (ref 0.2–1.2)
BUN SERPL-MCNC: 9 MG/DL — SIGNIFICANT CHANGE UP (ref 7–23)
CALCIUM SERPL-MCNC: 9.7 MG/DL — SIGNIFICANT CHANGE UP (ref 8.4–10.5)
CHLORIDE SERPL-SCNC: 103 MMOL/L — SIGNIFICANT CHANGE UP (ref 98–107)
CO2 SERPL-SCNC: 26 MMOL/L — SIGNIFICANT CHANGE UP (ref 22–31)
CREAT SERPL-MCNC: 0.47 MG/DL — LOW (ref 0.5–1.3)
EGFR: 126 ML/MIN/1.73M2 — SIGNIFICANT CHANGE UP
GLUCOSE SERPL-MCNC: 99 MG/DL — SIGNIFICANT CHANGE UP (ref 70–99)
POTASSIUM SERPL-MCNC: 4.1 MMOL/L — SIGNIFICANT CHANGE UP (ref 3.5–5.3)
POTASSIUM SERPL-SCNC: 4.1 MMOL/L — SIGNIFICANT CHANGE UP (ref 3.5–5.3)
PROT SERPL-MCNC: 6.9 G/DL — SIGNIFICANT CHANGE UP (ref 6–8.3)
SODIUM SERPL-SCNC: 138 MMOL/L — SIGNIFICANT CHANGE UP (ref 135–145)

## 2023-02-27 PROCEDURE — 99213 OFFICE O/P EST LOW 20 MIN: CPT | Mod: GC,25

## 2023-02-27 PROCEDURE — ZZZZZ: CPT

## 2023-02-27 PROCEDURE — 76819 FETAL BIOPHYS PROFIL W/O NST: CPT | Mod: 26,59

## 2023-02-27 PROCEDURE — 76816 OB US FOLLOW-UP PER FETUS: CPT | Mod: 26

## 2023-02-27 PROCEDURE — 90471 IMMUNIZATION ADMIN: CPT | Mod: NC,GC

## 2023-02-28 ENCOUNTER — NON-APPOINTMENT (OUTPATIENT)
Age: 38
End: 2023-02-28

## 2023-02-28 DIAGNOSIS — Z00.00 ENCOUNTER FOR GENERAL ADULT MEDICAL EXAMINATION WITHOUT ABNORMAL FINDINGS: ICD-10-CM

## 2023-02-28 DIAGNOSIS — R74.8 ABNORMAL LEVELS OF OTHER SERUM ENZYMES: ICD-10-CM

## 2023-02-28 DIAGNOSIS — O26.613 LIVER AND BILIARY TRACT DISORDERS IN PREGNANCY, THIRD TRIMESTER: ICD-10-CM

## 2023-02-28 DIAGNOSIS — E66.01 MORBID (SEVERE) OBESITY DUE TO EXCESS CALORIES: ICD-10-CM

## 2023-03-01 LAB — BILE AC FLD-MCNC: 11 UMOL/L — HIGH (ref 0–10)

## 2023-03-02 ENCOUNTER — NON-APPOINTMENT (OUTPATIENT)
Age: 38
End: 2023-03-02

## 2023-03-03 ENCOUNTER — OUTPATIENT (OUTPATIENT)
Dept: OUTPATIENT SERVICES | Facility: HOSPITAL | Age: 38
LOS: 1 days | End: 2023-03-03
Payer: MEDICAID

## 2023-03-03 DIAGNOSIS — R06.00 DYSPNEA, UNSPECIFIED: ICD-10-CM

## 2023-03-03 DIAGNOSIS — Z90.49 ACQUIRED ABSENCE OF OTHER SPECIFIED PARTS OF DIGESTIVE TRACT: Chronic | ICD-10-CM

## 2023-03-03 PROCEDURE — 93306 TTE W/DOPPLER COMPLETE: CPT | Mod: 26

## 2023-03-03 PROCEDURE — 93306 TTE W/DOPPLER COMPLETE: CPT

## 2023-03-13 ENCOUNTER — APPOINTMENT (OUTPATIENT)
Dept: CARDIOLOGY | Facility: CLINIC | Age: 38
End: 2023-03-13
Payer: MEDICAID

## 2023-03-13 ENCOUNTER — NON-APPOINTMENT (OUTPATIENT)
Age: 38
End: 2023-03-13

## 2023-03-13 ENCOUNTER — APPOINTMENT (OUTPATIENT)
Dept: GASTROENTEROLOGY | Facility: CLINIC | Age: 38
End: 2023-03-13
Payer: MEDICAID

## 2023-03-13 VITALS
BODY MASS INDEX: 55.32 KG/M2 | DIASTOLIC BLOOD PRESSURE: 78 MMHG | SYSTOLIC BLOOD PRESSURE: 119 MMHG | OXYGEN SATURATION: 98 % | HEIGHT: 61 IN | HEART RATE: 102 BPM | WEIGHT: 293 LBS | TEMPERATURE: 97.6 F

## 2023-03-13 PROCEDURE — 99214 OFFICE O/P EST MOD 30 MIN: CPT

## 2023-03-13 PROCEDURE — 93000 ELECTROCARDIOGRAM COMPLETE: CPT

## 2023-03-13 PROCEDURE — 99214 OFFICE O/P EST MOD 30 MIN: CPT | Mod: 25

## 2023-03-13 NOTE — ASSESSMENT
[FreeTextEntry1] : Abdominal Pain: The patient complains of abdominal pain. The patient is to avoid nonsteroidal anti-inflammatory drugs and aspirin.  I recommend contiue on a trial of Pepcid 40 mg twice a day for 3 months for the symptoms.\par Dyspepsia: The patient complains of dyspeptic symptoms.  The patient was advised to continue to abide by an anti-gas (low FOD-MAP) diet.  The patient was previously given a pamphlet for anti-gas (low FOD-MAP).  The patient and I reviewed the anti-gas (low FOD-MAP)diet at length again. The patient is to continue on a trial of Simethicone one tablet 4 times a day p.r.n. abdominal pain and gas.\par GERD: The patient was advised to avoid late-night meals and dietary indiscretions.  The patient was advised to avoid fried and fatty foods.  The patient was advised to abide by an anti-GERD diet. The patient was given a pamphlet for anti-GERD.  The patient and I reviewed the anti-GERD diet at length. I recommend continue on a trial of famotidine 40 mg twice a day x 3 months for the symptoms.\par Obesity: The patient is morbidly obese. The patient was advised to lose weight and exercise. The patient was advised to followup with a nutritionist regarding dieting for the weight loss. The patient cannot lose weight. The patient is being evaluated with bariatric surgeon. The patient is being followed for bariatric surgery for obesity with for possible gastric sleeve surgery after delivery. The patient agrees and will follow-up. I will try to obtain the prior upper endoscopy to assess results and any contraindications for bariatric surgery. The patient agreed and will followup.\par H. pylori Gastritis: The patient was found to have H. pylori gastritis on prior upper endoscopy. The patient completed a trial of Clarithromycin 500 mg 2 times a day, Amoxicillin 500mg 2 tablets twice a day and Omeprazole 40 mg twice a day for 14 days for H. pylori eradication. The patient is s/p treatment for H. pylori eradication but did not have followup testing to assess for eradication. The H. pylori breath test performed on January 9, 2023 was detected. I recommend a trial of antibiotics for H. pylori eradication after delivery. I recommend an H. pylori breath test to assess for eradication of the bacteria. \par Gastritis: The patient has a history of gastritis noted on prior upper endoscopy. The patient is to avoid nonsteroidal anti-inflammatory drugs and aspirin. I recommend continue on a trial of Famotidine 20 mg twice a day PRN for 3 months for the symptoms. \par Elevated Liver Enzymes and Pregnancy: The patient has elevated liver enzymes noted on prior blood work of unclear etiology. The patient denies any jaundice or pruritus. The patient denies any alcohol use. The patient denies taking large doses of nonsteroidal anti-inflammatory drugs or acetaminophen. I had a long discussion with the patient regarding the elevated liver enzymes and the possible progression of the liver disease during pregnancy. The patient was told of the possible increased risk of developing liver failure, ascites, renal impairment, ascites, GI bleeding, hepatic encephalopathy, bleeding tendencies and fetal demise. The patient was told of the importance of follow-up. The patient was advised to follow up every month for blood work. I recommend avoid alcohol and hepato-toxic agents. I recommend avoiding large doses of nonsteroidal anti-inflammatory drugs or acetaminophen. The patient agreed and will follow-up to reassess the symptoms. I recommend evaluation with hepatologist, Dr. Jeffrey Hoang for the elevated liver enzymes in pregnancy.\par Fatty Liver: The patient had an imaging study suggestive of fatty infiltration of the liver. The patient denies any jaundice or pruritus. The patient denies any alcohol use. The patient denies taking large doses of nonsteroidal anti-inflammatory drugs or acetaminophen. The findings are suggestive of fatty liver. The patient and I had a long discussion regarding the risks of fatty liver and possible progression to cirrhosis. The patient was told of the possible increased risk of developing liver failure, cirrhosis, ascites, GI bleeding secondary to varices, hepatic encephalopathy, bleeding tendencies and liver cancer. The patient was told of the importance of follow-up. The patient was advised to follow up every 6 months for blood work and imaging studies. The patient agreed and will follow up. The patient was advised to lose weight and exercise. The patient was advised to abide by a Mediterranean diet. If the liver enzymes remain elevated, the patient may require a trial of Pioglitazone for the fatty liver. I recommend avoid alcohol and hepato-toxic agents. The patient was also advised to avoid NSAIDs, Acetaminophen and any other hepatotoxic drugs. The patient was also advised not to share needles, razors, scissors, nail clippers, etc.. The patient is to continue close follow-up in our office for blood work and exams. If the liver enzymes remain elevated, the patient may require a CT guided liver biopsy to assess the liver parenchyma for possible treatment. We had a long discussion regarding the risks and benefits of the procedure. The patient was told of the risks of bleeding, perforation, infections, emergency surgery and missing lesions. The patient agreed and will follow-up to reassess the symptoms. \par Blood Work: I recommend blood work to assess the patient's symptoms. I recommend a CBC, SMA 24, amylase, lipase, ESR, ,iron, TIBC, ferritin level and fibrinogen. I also recommend obtaining the recent blood work performed by the patient's PMD.\par Follow-up: The patient is to follow-up in the office in 4 weeks to reassess the symptoms. The patient was told to call the office if any further problems. \par

## 2023-03-13 NOTE — HISTORY OF PRESENT ILLNESS
[FreeTextEntry1] : The patient has a history of pregnancy (31-week gestation). The patient is being followed by Dr. Shahana Huff and Dr. Danica Conde and morbid obesity and by Dr. Raymundo Hernandez DO. The patient has a history of liver disease. The patient has a history of fatty liver noted on prior imaging study. The patient denies any prior exposure to hepatitis A, B or C. The patient denies any large doses of nonsteroidal anti-inflammatory drugs or acetaminophen. The patient denies sharing needles, razors, nail clippers, nail files, scissors, et cetera. The patient denies any EtOH abuse, cocaine use or intravenous drug use. The patient denies any prior blood transfusions, sexual indiscretions, tattoos or piercing. The patient admits to having prior surgery. The history of surgery is significant for a prior cholecystectomy and  x 2. The patient admits to a family history of GI and liver problems. The patient's mother had a history of gallbladder disease. The patient states that she is feeling the same.  The patient complains of occasional pruritus butdenies any jaundice.  The patient complains of chronic lower back pain. The patient denies any abdominal pain.  The patient complains of abdominal gas and bloating.  The patient denies any abdominal gas and bloating.  The patient denies any nausea or vomiting.  The patient complains of occasional gastroesophageal reflux disease but denies any dysphagia.  The gastroesophageal reflux disease is worse after meals and late at night and in the early morning. The gastroesophageal reflux disease is improved with proton pump inhibitors, H2 blockers and antacids.   The patient denies any atypical chest pain, shortness of breath or palpitations.  The patient denies any diaphoresis. The patient complains of occasional alternating diarrhea/constipation.  The patient has 3 bowel movements a day. The diarrhea is described as soft in nature.   The patient complains of a change in bowel habits.  The patient complains of a change in caliber of stool. The patient denies having mucus discharge with the bowel movements.  The patient denies any bright red blood per rectum, melena or hematemesis.  The patient denies any rectal pain or rectal pruritus.  The patient denies any weight loss or anorexia.  The patient admits to gaining weight during the pregnancy.  She denies any fevers or chills.  The patient had an upper endoscopy performed by another gastroenterologist, Dr. Eve Lopez on 2022. The upper endoscopy performed by another gastroenterologist, Dr. Eve Lopez on 2022 revealed a normal esophagus, irregular Z-line at 40 cm from the incisors, gastroesophageal flap valve classified as Hill grade 2 (fold present, opens with respiration), erythematous mucosa in the stomach, mucosal nodule found in the duodenum likely Brunner's gland and normal examined duodenum. The gastric pathology performed on 2022 revealed gastric antral and oxyntic mucosa with H. pylori associated chronic active gastritis that was positive for Helicobacter pylori and negative for intestinal metaplasia (stomach), duodenal mucosa with mild increase in intraepithelial lymphocytes with otherwise preserved villous architecture and mild increase in intraepithelial lymphocytes (duodenum) and polypoid duodenal mucosa with H. pylori associated chronic active peptic duodenitis that was negative for dysplasia or neoplasia (duodenal nodule). The patient is s/p treatment for H. pylori eradication but did not have followup testing to assess for eradication. The H. pylori breath test performed on 2023 was detected. I recommend a trial of antibiotics for H. pylori eradication after delivery. The abdominal ultrasound performed on 2022 revealed heterogeneous liver which may reflect steatosis. The blood work performed on 2023 revealed a normal amylase/lipase level of 35/38 U/L, respectively, no evidence of anemia with a hemoglobin/hematocrit level of 12.4/42.2 a low CO2 of 21 mmol/L, and elevated blood glucose of 108 mg/dL, elevated liver enzymes with an AST/ALT of 99/72/U/L, respectively, a normal total bilirubin of 0.2 mg/dL, a normal alkaline phosphatase/GGTP of 84/21 U/L, respectively, a normal PT/INR of 12.3/1.06 and an elevated ESR of 88 mm/h. The blood work performed on 2023 revealed a normal PTT/INR/PTT of 11.5/0.99/32.9 seconds, a normal amylase/lipase level of 29/19 U/L, respectively, no evidence of anemia with a hemoglobin/hematocrit level of 11.6/39.1, respectively, and elevated alpha 1 antitrypsin of 278 mg/dL, a low CO2 of 20 mmol/L, elevated liver enzymes with an AST/ALT of 124/77/U/L, respectively, a normal total bilirubin of 0.2 mg/dL, a normal alkaline phosphatase/GGTP of 87/29 U/L, a normal serum iron level of 98 mcg/dL, and elevated TIBC/U IBC of 511/413 mcg/dL, respectively, a normal iron percent saturation of 19%, an elevated ferritin level of 268 ng/mL, and elevated total cholesterol/triglyceride level of 241/174 mg/dL, respectively, a normal rheumatoid factor <10 IU/mL, a normal TSH of 2.36u IU/mL, and an elevated ESR of 83 mm/h. The blood work performed on 2023 revealed no evidence of anemia with a hemoglobin/hematocrit level of 11.7/38.9, respectively, a low blood glucose of 51 mg/dL, a low creatinine level of 0.47 mg/dL, elevated liver enzymes with an AST/ALT of 84/51/U/L, respectively, a normal alkaline phosphatase of 93/U/L, a normal total bilirubin of 0.2 mg/dL, and elevated LDH of 255/U/L, a positive CMV IgG antibody of >10.00 unit/mL, a negative CMV IgM antibody of <8.0 AU/mL, a negative Toxoplasma IgG of <3.0 IU/mL, a negative Toxoplasma IgM of <3.0 AU/ml, and elevated total bile acid of 12.0 umol/L. The patient is currently pregnant with gestation x 21 weeks. The patient's last menstrual period was on 2022. The patient's periods are regular at 28 to 30 days. The patient's menstrual periods are light for 3 to 4 days. The patient is a . The patient's first menstrual period was at age 13. The patient denies any significant family history of GI problems. \par \par (-) smoking, (-) ETOH, (-) IVDA\par  [de-identified] : The upper endoscopy performed by another gastroenterologist, Dr. Eve Lopez on September 6, 2022 revealed a normal esophagus, irregular Z-line at 40 cm from the incisors, gastroesophageal flap valve classified as Hill grade 2 (fold present, opens with respiration), erythematous mucosa in the stomach, mucosal nodule found in the duodenum likely Brunner's gland and normal examined duodenum.  The gastric pathology performed on September 6, 2022 revealed gastric antral and oxyntic mucosa with H. pylori associated chronic active gastritis that was positive for Helicobacter pylori and negative for intestinal metaplasia (stomach), duodenal mucosa with mild increase in intraepithelial lymphocytes with otherwise preserved villous architecture and mild increase in intraepithelial lymphocytes (duodenum) and polypoid duodenal mucosa with H. pylori associated chronic active peptic duodenitis that was negative for dysplasia or neoplasia (duodenal nodule). [de-identified] : The abdominal ultrasound performed on December 8, 2022 revealed heterogeneous liver which may reflect steatosis.

## 2023-03-14 ENCOUNTER — NON-APPOINTMENT (OUTPATIENT)
Age: 38
End: 2023-03-14

## 2023-03-15 ENCOUNTER — NON-APPOINTMENT (OUTPATIENT)
Age: 38
End: 2023-03-15

## 2023-03-17 ENCOUNTER — NON-APPOINTMENT (OUTPATIENT)
Age: 38
End: 2023-03-17

## 2023-03-17 LAB
ALBUMIN SERPL ELPH-MCNC: 3.6 G/DL
ALP BLD-CCNC: 108 U/L
ALT SERPL-CCNC: 71 U/L
AMYLASE/CREAT SERPL: 31 U/L
ANION GAP SERPL CALC-SCNC: 12 MMOL/L
APTT BLD: 31.3 SEC
AST SERPL-CCNC: 121 U/L
BASOPHILS # BLD AUTO: 0.03 K/UL
BASOPHILS NFR BLD AUTO: 0.4 %
BILIRUB SERPL-MCNC: 0.2 MG/DL
BUN SERPL-MCNC: 10 MG/DL
CALCIUM SERPL-MCNC: 9.3 MG/DL
CHLORIDE SERPL-SCNC: 105 MMOL/L
CHOLEST SERPL-MCNC: 233 MG/DL
CO2 SERPL-SCNC: 22 MMOL/L
CREAT SERPL-MCNC: 0.48 MG/DL
EGFR: 125 ML/MIN/1.73M2
EOSINOPHIL # BLD AUTO: 0.05 K/UL
EOSINOPHIL NFR BLD AUTO: 0.6 %
ERYTHROCYTE [SEDIMENTATION RATE] IN BLOOD BY WESTERGREN METHOD: 83 MM/HR
FERRITIN SERPL-MCNC: 148 NG/ML
FIBRINOGEN AG PPP IA-MCNC: 753 MG/DL
GGT SERPL-CCNC: 29 U/L
GLUCOSE SERPL-MCNC: 123 MG/DL
HCT VFR BLD CALC: 40 %
HDLC SERPL-MCNC: 48 MG/DL
HGB BLD-MCNC: 11.8 G/DL
IMM GRANULOCYTES NFR BLD AUTO: 0.5 %
INR PPP: 0.98 RATIO
IRON SATN MFR SERPL: 18 %
IRON SERPL-MCNC: 97 UG/DL
LDLC SERPL CALC-MCNC: 140 MG/DL
LPL SERPL-CCNC: 25 U/L
LYMPHOCYTES # BLD AUTO: 1.28 K/UL
LYMPHOCYTES NFR BLD AUTO: 16.3 %
MAN DIFF?: NORMAL
MCHC RBC-ENTMCNC: 28.7 PG
MCHC RBC-ENTMCNC: 29.5 GM/DL
MCV RBC AUTO: 97.3 FL
MONOCYTES # BLD AUTO: 0.43 K/UL
MONOCYTES NFR BLD AUTO: 5.5 %
NEUTROPHILS # BLD AUTO: 6.01 K/UL
NEUTROPHILS NFR BLD AUTO: 76.7 %
NONHDLC SERPL-MCNC: 185 MG/DL
PLATELET # BLD AUTO: 327 K/UL
POTASSIUM SERPL-SCNC: 4.4 MMOL/L
PROT SERPL-MCNC: 6.3 G/DL
PT BLD: 11.6 SEC
RBC # BLD: 4.11 M/UL
RBC # FLD: 15.2 %
SODIUM SERPL-SCNC: 139 MMOL/L
TIBC SERPL-MCNC: 531 UG/DL
TRIGL SERPL-MCNC: 223 MG/DL
UIBC SERPL-MCNC: 434 UG/DL
WBC # FLD AUTO: 7.84 K/UL

## 2023-03-20 ENCOUNTER — OUTPATIENT (OUTPATIENT)
Dept: OUTPATIENT SERVICES | Facility: HOSPITAL | Age: 38
LOS: 1 days | End: 2023-03-20

## 2023-03-20 ENCOUNTER — RESULT REVIEW (OUTPATIENT)
Age: 38
End: 2023-03-20

## 2023-03-20 ENCOUNTER — APPOINTMENT (OUTPATIENT)
Dept: OBGYN | Facility: HOSPITAL | Age: 38
End: 2023-03-20
Payer: MEDICAID

## 2023-03-20 VITALS
SYSTOLIC BLOOD PRESSURE: 124 MMHG | HEART RATE: 101 BPM | BODY MASS INDEX: 55.32 KG/M2 | DIASTOLIC BLOOD PRESSURE: 76 MMHG | TEMPERATURE: 97.9 F | WEIGHT: 293 LBS | HEIGHT: 61 IN

## 2023-03-20 LAB
APPEARANCE UR: CLEAR — SIGNIFICANT CHANGE UP
BACTERIA # UR AUTO: ABNORMAL
BILIRUB UR-MCNC: NEGATIVE — SIGNIFICANT CHANGE UP
COLOR SPEC: SIGNIFICANT CHANGE UP
DIFF PNL FLD: NEGATIVE — SIGNIFICANT CHANGE UP
EPI CELLS # UR: 8 /HPF — HIGH (ref 0–5)
GLUCOSE UR QL: NEGATIVE — SIGNIFICANT CHANGE UP
HYALINE CASTS # UR AUTO: 1 /LPF — SIGNIFICANT CHANGE UP (ref 0–7)
KETONES UR-MCNC: NEGATIVE — SIGNIFICANT CHANGE UP
LEUKOCYTE ESTERASE UR-ACNC: ABNORMAL
NITRITE UR-MCNC: NEGATIVE — SIGNIFICANT CHANGE UP
PH UR: 6.5 — SIGNIFICANT CHANGE UP (ref 5–8)
PROT UR-MCNC: ABNORMAL
RBC CASTS # UR COMP ASSIST: 2 /HPF — SIGNIFICANT CHANGE UP (ref 0–4)
SP GR SPEC: 1.02 — SIGNIFICANT CHANGE UP (ref 1.01–1.05)
UROBILINOGEN FLD QL: SIGNIFICANT CHANGE UP
WBC UR QL: 3 /HPF — SIGNIFICANT CHANGE UP (ref 0–5)

## 2023-03-20 PROCEDURE — 99213 OFFICE O/P EST LOW 20 MIN: CPT | Mod: 25,GC

## 2023-03-21 DIAGNOSIS — O09.90 SUPERVISION OF HIGH RISK PREGNANCY, UNSPECIFIED, UNSPECIFIED TRIMESTER: ICD-10-CM

## 2023-03-21 DIAGNOSIS — K83.1 OBSTRUCTION OF BILE DUCT: ICD-10-CM

## 2023-03-21 DIAGNOSIS — E66.9 OBESITY, UNSPECIFIED: ICD-10-CM

## 2023-03-21 LAB
C TRACH RRNA SPEC QL NAA+PROBE: SIGNIFICANT CHANGE UP
CANDIDA AB TITR SER: SIGNIFICANT CHANGE UP
CULTURE RESULTS: SIGNIFICANT CHANGE UP
G VAGINALIS DNA SPEC QL NAA+PROBE: SIGNIFICANT CHANGE UP
N GONORRHOEA RRNA SPEC QL NAA+PROBE: SIGNIFICANT CHANGE UP
SPECIMEN SOURCE: SIGNIFICANT CHANGE UP
SPECIMEN SOURCE: SIGNIFICANT CHANGE UP
T VAGINALIS SPEC QL WET PREP: SIGNIFICANT CHANGE UP

## 2023-03-22 ENCOUNTER — NON-APPOINTMENT (OUTPATIENT)
Age: 38
End: 2023-03-22

## 2023-03-23 ENCOUNTER — APPOINTMENT (OUTPATIENT)
Dept: ANTEPARTUM | Facility: CLINIC | Age: 38
End: 2023-03-23
Payer: MEDICAID

## 2023-03-23 ENCOUNTER — ASOB RESULT (OUTPATIENT)
Age: 38
End: 2023-03-23

## 2023-03-23 PROCEDURE — 76818 FETAL BIOPHYS PROFILE W/NST: CPT

## 2023-03-24 ENCOUNTER — TRANSCRIPTION ENCOUNTER (OUTPATIENT)
Age: 38
End: 2023-03-24

## 2023-03-29 ENCOUNTER — NON-APPOINTMENT (OUTPATIENT)
Age: 38
End: 2023-03-29

## 2023-03-29 ENCOUNTER — APPOINTMENT (OUTPATIENT)
Dept: HEPATOLOGY | Facility: CLINIC | Age: 38
End: 2023-03-29
Payer: MEDICAID

## 2023-03-29 VITALS
HEIGHT: 61 IN | RESPIRATION RATE: 16 BRPM | DIASTOLIC BLOOD PRESSURE: 84 MMHG | SYSTOLIC BLOOD PRESSURE: 124 MMHG | TEMPERATURE: 98.1 F | BODY MASS INDEX: 55.32 KG/M2 | HEART RATE: 98 BPM | OXYGEN SATURATION: 97 % | WEIGHT: 293 LBS

## 2023-03-29 PROCEDURE — 99204 OFFICE O/P NEW MOD 45 MIN: CPT

## 2023-03-30 ENCOUNTER — APPOINTMENT (OUTPATIENT)
Dept: MATERNAL FETAL MEDICINE | Facility: HOSPITAL | Age: 38
End: 2023-03-30
Payer: MEDICAID

## 2023-03-30 ENCOUNTER — ASOB RESULT (OUTPATIENT)
Age: 38
End: 2023-03-30

## 2023-03-30 PROCEDURE — 76818 FETAL BIOPHYS PROFILE W/NST: CPT | Mod: 26,59

## 2023-03-30 PROCEDURE — 76816 OB US FOLLOW-UP PER FETUS: CPT | Mod: 26

## 2023-04-03 ENCOUNTER — RESULT REVIEW (OUTPATIENT)
Age: 38
End: 2023-04-03

## 2023-04-03 ENCOUNTER — APPOINTMENT (OUTPATIENT)
Dept: ULTRASOUND IMAGING | Facility: IMAGING CENTER | Age: 38
End: 2023-04-03
Payer: MEDICAID

## 2023-04-03 ENCOUNTER — OUTPATIENT (OUTPATIENT)
Dept: OUTPATIENT SERVICES | Facility: HOSPITAL | Age: 38
LOS: 1 days | End: 2023-04-03

## 2023-04-03 ENCOUNTER — APPOINTMENT (OUTPATIENT)
Dept: MATERNAL FETAL MEDICINE | Facility: HOSPITAL | Age: 38
End: 2023-04-03
Payer: MEDICAID

## 2023-04-03 ENCOUNTER — ASOB RESULT (OUTPATIENT)
Age: 38
End: 2023-04-03

## 2023-04-03 ENCOUNTER — APPOINTMENT (OUTPATIENT)
Dept: OBGYN | Facility: HOSPITAL | Age: 38
End: 2023-04-03
Payer: MEDICAID

## 2023-04-03 ENCOUNTER — OUTPATIENT (OUTPATIENT)
Dept: OUTPATIENT SERVICES | Facility: HOSPITAL | Age: 38
LOS: 1 days | End: 2023-04-03
Payer: MEDICAID

## 2023-04-03 ENCOUNTER — APPOINTMENT (OUTPATIENT)
Dept: ANTEPARTUM | Facility: CLINIC | Age: 38
End: 2023-04-03
Payer: MEDICAID

## 2023-04-03 VITALS
TEMPERATURE: 98 F | HEART RATE: 96 BPM | WEIGHT: 293 LBS | HEIGHT: 61 IN | BODY MASS INDEX: 55.32 KG/M2 | DIASTOLIC BLOOD PRESSURE: 71 MMHG | SYSTOLIC BLOOD PRESSURE: 133 MMHG

## 2023-04-03 DIAGNOSIS — R74.8 ABNORMAL LEVELS OF OTHER SERUM ENZYMES: ICD-10-CM

## 2023-04-03 LAB — T PALLIDUM AB TITR SER: NEGATIVE — SIGNIFICANT CHANGE UP

## 2023-04-03 PROCEDURE — 93975 VASCULAR STUDY: CPT

## 2023-04-03 PROCEDURE — 76700 US EXAM ABDOM COMPLETE: CPT

## 2023-04-03 PROCEDURE — 99213 OFFICE O/P EST LOW 20 MIN: CPT | Mod: GC,25

## 2023-04-03 PROCEDURE — 76700 US EXAM ABDOM COMPLETE: CPT | Mod: 26,59

## 2023-04-03 PROCEDURE — 93975 VASCULAR STUDY: CPT | Mod: 26

## 2023-04-03 PROCEDURE — 76818 FETAL BIOPHYS PROFILE W/NST: CPT

## 2023-04-04 DIAGNOSIS — O09.523 SUPERVISION OF ELDERLY MULTIGRAVIDA, THIRD TRIMESTER: ICD-10-CM

## 2023-04-04 DIAGNOSIS — O99.213 OBESITY COMPLICATING PREGNANCY, THIRD TRIMESTER: ICD-10-CM

## 2023-04-04 DIAGNOSIS — O26.613 LIVER AND BILIARY TRACT DISORDERS IN PREGNANCY, THIRD TRIMESTER: ICD-10-CM

## 2023-04-10 ENCOUNTER — APPOINTMENT (OUTPATIENT)
Dept: MATERNAL FETAL MEDICINE | Facility: HOSPITAL | Age: 38
End: 2023-04-10

## 2023-04-10 ENCOUNTER — ASOB RESULT (OUTPATIENT)
Age: 38
End: 2023-04-10

## 2023-04-10 ENCOUNTER — APPOINTMENT (OUTPATIENT)
Dept: ANTEPARTUM | Facility: CLINIC | Age: 38
End: 2023-04-10
Payer: MEDICAID

## 2023-04-10 ENCOUNTER — APPOINTMENT (OUTPATIENT)
Dept: GASTROENTEROLOGY | Facility: CLINIC | Age: 38
End: 2023-04-10
Payer: MEDICAID

## 2023-04-10 VITALS
BODY MASS INDEX: 55.32 KG/M2 | DIASTOLIC BLOOD PRESSURE: 82 MMHG | HEART RATE: 94 BPM | SYSTOLIC BLOOD PRESSURE: 136 MMHG | WEIGHT: 293 LBS | TEMPERATURE: 98.2 F | OXYGEN SATURATION: 96 % | HEIGHT: 61 IN

## 2023-04-10 LAB
ALBUMIN SERPL ELPH-MCNC: 3.5 G/DL
ALP BLD-CCNC: 119 U/L
ALT SERPL-CCNC: 60 U/L
ANION GAP SERPL CALC-SCNC: 14 MMOL/L
APTT BLD: 31 SEC
AST SERPL-CCNC: 113 U/L
BASOPHILS # BLD AUTO: 0.04 K/UL
BASOPHILS NFR BLD AUTO: 0.5 %
BILE AC SER-MCNC: 17 UMOL/L
BILIRUB DIRECT SERPL-MCNC: 0.1 MG/DL
BILIRUB INDIRECT SERPL-MCNC: 0.1 MG/DL
BILIRUB SERPL-MCNC: <0.2 MG/DL
BUN SERPL-MCNC: 11 MG/DL
CALCIUM SERPL-MCNC: 9.6 MG/DL
CERULOPLASMIN SERPL-MCNC: 42 MG/DL
CHLORIDE SERPL-SCNC: 105 MMOL/L
CO2 SERPL-SCNC: 21 MMOL/L
CREAT SERPL-MCNC: 0.55 MG/DL
EBV DNA SERPL NAA+PROBE-ACNC: NOT DETECTED IU/ML
EBV EA AB SER IA-ACNC: 8.2 U/ML
EBV EA AB TITR SER IF: POSITIVE
EBV EA IGG SER QL IA: >600 U/ML
EBV EA IGG SER-ACNC: NEGATIVE
EBV EA IGM SER IA-ACNC: NEGATIVE
EBV PATRN SPEC IB-IMP: NORMAL
EBV VCA IGG SER IA-ACNC: >750 U/ML
EBV VCA IGM SER QL IA: <10 U/ML
EBVPCR LOG: NOT DETECTED LOG10IU/ML
EGFR: 121 ML/MIN/1.73M2
EOSINOPHIL # BLD AUTO: 0.07 K/UL
EOSINOPHIL NFR BLD AUTO: 0.9 %
EPSTEIN-BARR VIRUS CAPSID ANTIGEN IGG: POSITIVE
FIBRINOGEN AG PPP IA-MCNC: 718 MG/DL
GLUCOSE SERPL-MCNC: 114 MG/DL
HCT VFR BLD CALC: 39.2 %
HGB BLD-MCNC: 11.7 G/DL
IMM GRANULOCYTES NFR BLD AUTO: 0.3 %
INR PPP: 1.02 RATIO
LYMPHOCYTES # BLD AUTO: 2.01 K/UL
LYMPHOCYTES NFR BLD AUTO: 25.8 %
MAN DIFF?: NORMAL
MCHC RBC-ENTMCNC: 28.5 PG
MCHC RBC-ENTMCNC: 29.8 GM/DL
MCV RBC AUTO: 95.4 FL
MONOCYTES # BLD AUTO: 0.52 K/UL
MONOCYTES NFR BLD AUTO: 6.7 %
NEUTROPHILS # BLD AUTO: 5.13 K/UL
NEUTROPHILS NFR BLD AUTO: 65.8 %
PLATELET # BLD AUTO: 338 K/UL
POTASSIUM SERPL-SCNC: 4.6 MMOL/L
PROT SERPL-MCNC: 6 G/DL
PT BLD: 12 SEC
RBC # BLD: 4.11 M/UL
RBC # FLD: 15.6 %
SODIUM SERPL-SCNC: 140 MMOL/L
URATE SERPL-MCNC: 6.1 MG/DL
VARICELLA ZOSTER VIRUS (VZV), DNA PCR: NEGATIVE
WBC # FLD AUTO: 7.79 K/UL

## 2023-04-10 PROCEDURE — 76818 FETAL BIOPHYS PROFILE W/NST: CPT

## 2023-04-10 PROCEDURE — 99214 OFFICE O/P EST MOD 30 MIN: CPT

## 2023-04-10 NOTE — REVIEW OF SYSTEMS
[Fever] : no fever [Chills] : no chills [Abdominal Pain] : no abdominal pain [Vomiting] : no vomiting [Constipation] : no constipation [Diarrhea] : no diarrhea [Heartburn] : no heartburn [Melena] : no melena [Dysuria] : no dysuria [Arthralgias] : no arthralgias [Confused] : no confusion [Negative] : Heme/Lymph [FreeTextEntry7] : No nausea. No hematochezia. [de-identified] : Itching improved on Ursodiol.

## 2023-04-10 NOTE — ASSESSMENT
[FreeTextEntry1] : 36 yo morbidly obese Female, currently 33 weeks pregnant (LMP 8/6/2022) with 6th child, with Hx of sleep apnea (has, but not using CPAP), Hx of Helicobacter (s/p failed treatment), was seen by GI (Dr. Castañeda) for elevated liver enzymes in hepatocellular pattern, noted since at least 11/2022. She was found to have mildly elevated bile acids (12), and been treated with Ursodiol since beginning of this year, with improvement of her itching, but without improvement of her liver enzymes.\par \par Intrahepatic cholestasis of pregnancy\par - C/w Ursodiol, states that has refill\par - Discussed risks, as well as risks and benefits of Ursodiol. Dose can be increased, as she is getting < 13 mg/bwkg/day, would increase to 500 mg bid and if tolerates, could consider further adjust. \par - Timing of delivery per Ob/Gyn\par \par Elevated liver enzymes\par - ICP might contribute, but it seems that her liver enzymes have been elevated from the beginning of pregnancy, thus likely she has underlying chronic liver disease as well, that contributes to her abnormal liver tests, eg. NAFLD/HERNANDEZ.\par - Unable to do Fibroscan or MR elastography during her pregnancy\par - Will get complete US abd w/ Doppler to assess spleen size and hepatic vasculature as well\par - Will get LFTs and preeclampsia labs\par - Denies prior pregnancy issues.\par - Currently BP acceptable, denies complaints. PLT, Hb WNL. At present no suspicion for HELLP or AFLP, but will need close monitoring, \par - Follow up w/ Gyn. Reports having in few days. \par - Discussed that after delivery further workup will be needed too.\par - Discussed when to seek immediate medical attention.\par \par RTC 2 weeks\par \par \par

## 2023-04-10 NOTE — PHYSICAL EXAM
[Scleral Icterus] : No Scleral Icterus [Spider Angioma] : No spider angioma(s) were observed [Abdominal  Ascites] : no ascites [Asterixis] : no asterixis observed [Jaundice] : No jaundice [Palmar Erythema] : no Palmar Erythema [Depression] : no depression [FreeTextEntry4] : Limited exam b/o morbid obesity + pregnancy [General Appearance - Alert] : alert [General Appearance - In No Acute Distress] : in no acute distress [General Appearance - Well Nourished] : well nourished [General Appearance - Well Developed] : well developed [Sclera] : the sclera and conjunctiva were normal [Oropharynx] : the oropharynx was normal [Neck Appearance] : the appearance of the neck was normal [Jugular Venous Distention Increased] : there was no jugular-venous distention [] : no respiratory distress [Respiration, Rhythm And Depth] : normal respiratory rhythm and effort [Exaggerated Use Of Accessory Muscles For Inspiration] : no accessory muscle use [Auscultation Breath Sounds / Voice Sounds] : lungs were clear to auscultation bilaterally [Heart Rate And Rhythm] : heart rate was normal and rhythm regular [Edema] : there was no peripheral edema [Bowel Sounds] : normal bowel sounds [Abdomen Soft] : soft [Abdomen Tenderness] : non-tender [No CVA Tenderness] : no ~M costovertebral angle tenderness [No Spinal Tenderness] : no spinal tenderness [Abnormal Walk] : normal gait [Skin Color & Pigmentation] : normal skin color and pigmentation [FreeTextEntry1] : Grossly intact [Oriented To Time, Place, And Person] : oriented to person, place, and time [Impaired Insight] : insight and judgment were intact [Affect] : the affect was normal [Mood] : the mood was normal

## 2023-04-10 NOTE — HISTORY OF PRESENT ILLNESS
[FreeTextEntry1] : The patient has a history of pregnancy (35-week gestation). The patient is being followed by Dr. Shahana Huff and Dr. Danica Conde and morbid obesity and by Dr. Raymundo Hernandez DO. The patient has a history of liver disease. The patient has a history of fatty liver noted on prior imaging study. The patient denies any prior exposure to hepatitis A, B or C. The patient denies any large doses of nonsteroidal anti-inflammatory drugs or acetaminophen. The patient denies sharing needles, razors, nail clippers, nail files, scissors, et cetera. The patient denies any EtOH abuse, cocaine use or intravenous drug use. The patient denies any prior blood transfusions, sexual indiscretions, tattoos or piercing. The patient admits to having prior surgery. The history of surgery is significant for a prior cholecystectomy and  x 2. The patient admits to a family history of GI and liver problems. The patient's mother had a history of gallbladder disease. The patient states that she is feeling fine. The patient denies any jaundice or pruritus.  The patient complains of occasional lower back pain. The patient denies any abdominal pain.  The patient complains of abdominal gas and bloating.  The patient denies any nausea or vomiting.  The patient complains of occasional gastroesophageal reflux disease but denies any dysphagia.  The gastroesophageal reflux disease is worse after meals and in the early morning. The gastroesophageal reflux disease is improved with proton pump inhibitors, H2 blockers and antacids.   The patient denies any atypical chest pain, shortness of breath or palpitations.  The patient denies any diaphoresis. The patient complains of occasional constipation but denies any diarrhea.  The patient has 1 to 3 bowel movements a day. The patient complains of a change in bowel habits.  The patient complains of a change in caliber of stool. The patient denies having mucus discharge with the bowel movements.  The patient denies any bright red blood per rectum, melena or hematemesis.  The patient denies any rectal pain or rectal pruritus.  The patient denies any weight loss or anorexia.  The patient admits to gaining weight recently.  She denies any fevers or chills.  The patient had an upper endoscopy performed by another gastroenterologist, Dr. Eve Lopez on 2022. The upper endoscopy performed by another gastroenterologist, Dr. Eve Lopez on 2022 revealed a normal esophagus, irregular Z-line at 40 cm from the incisors, gastroesophageal flap valve classified as Hill grade 2 (fold present, opens with respiration), erythematous mucosa in the stomach, mucosal nodule found in the duodenum likely Brunner's gland and normal examined duodenum. The gastric pathology performed on 2022 revealed gastric antral and oxyntic mucosa with H. pylori associated chronic active gastritis that was positive for Helicobacter pylori and negative for intestinal metaplasia (stomach), duodenal mucosa with mild increase in intraepithelial lymphocytes with otherwise preserved villous architecture and mild increase in intraepithelial lymphocytes (duodenum) and polypoid duodenal mucosa with H. pylori associated chronic active peptic duodenitis that was negative for dysplasia or neoplasia (duodenal nodule). The patient is s/p treatment for H. pylori eradication but did not have followup testing to assess for eradication. The H. pylori breath test performed on 2023 was detected. I recommend a trial of antibiotics for H. pylori eradication after delivery. The abdominal ultrasound performed on 2022 revealed heterogeneous liver which may reflect steatosis. The blood work performed on 2023 revealed no evidence of anemia with a hemoglobin/hematocrit level of 11.7/39.2, respectively, a normal PT/INR/PTT of 12.0/1.02/31.0, respectively, a low CO2 of 21 mmol/L, and elevated blood glucose of 114 mg/dL, a normal total bilirubin of <0.2 mg/dL, and elevated AST/ALT of 113/60/U/L, respectively, a normal alkaline phosphatase of 119/U/L, a normal uric acid level of 6.1 mg/dL, and elevated total bile acids of 7.0 µmol/L, and elevated fibrinogen level of 718 mg/dL.  The abdominal ultrasound performed on April 3, 2023 revealed hepatic steatosis, hepatomegaly and partially imaged gravid uterus.  The patient is being followed by hepatology, Dr. Jeffrey Hoang. The patient was started on ursodiol 300 mg twice a day for cholestasis of pregnancy.  The patient is currently pregnant with gestation x 21 weeks. The patient's last menstrual period was on 2022. The patient's periods are regular at 28 to 30 days. The patient's menstrual periods are light for 3 to 4 days. The patient is a . The patient's first menstrual period was at age 13. The patient denies any significant family history of GI problems. \par \par (-) smoking, (-) ETOH, (-) IVDA\par \par  [de-identified] : The upper endoscopy performed by another gastroenterologist, Dr. Eve Lopez on September 6, 2022 revealed a normal esophagus, irregular Z-line at 40 cm from the incisors, gastroesophageal flap valve classified as Hill grade 2 (fold present, opens with respiration), erythematous mucosa in the stomach, mucosal nodule found in the duodenum likely Brunner's gland and normal examined duodenum.  The gastric pathology performed on September 6, 2022 revealed gastric antral and oxyntic mucosa with H. pylori associated chronic active gastritis that was positive for Helicobacter pylori and negative for intestinal metaplasia (stomach), duodenal mucosa with mild increase in intraepithelial lymphocytes with otherwise preserved villous architecture and mild increase in intraepithelial lymphocytes (duodenum) and polypoid duodenal mucosa with H. pylori associated chronic active peptic duodenitis that was negative for dysplasia or neoplasia (duodenal nodule). [de-identified] : The abdominal ultrasound performed on April 3, 2023 revealed hepatic steatosis, hepatomegaly and partially imaged gravid uterus.  \par \par The abdominal ultrasound performed on December 8, 2022 revealed heterogeneous liver which may reflect steatosis.

## 2023-04-10 NOTE — ASSESSMENT
[FreeTextEntry1] : Dyspepsia: The patient complains of dyspeptic symptoms.  The patient was advised to continue to abide by an anti-gas (low FOD-MAP) diet.  The patient was previously given a pamphlet for anti-gas (low FOD-MAP).  The patient and I reviewed the anti-gas (low FOD-MAP)diet at length again. The patient is to continue on a trial of Simethicone one tablet 4 times a day p.r.n. abdominal pain and gas.\par GERD: The patient was advised to avoid late-night meals and dietary indiscretions.  The patient was advised to avoid fried and fatty foods.  The patient was advised to abide by an anti-GERD diet. The patient was given a pamphlet for anti-GERD.  The patient and I reviewed the anti-GERD diet at length. I recommend a trial of famotidine 20 mg twice a day x 3 months for the symptoms.\par Cholestasis of Pregnancy: The patient was recently diagnosed with cholestasis of pregnancy.  The patient is being followed by her hematologist, Dr. Jeffrey Hoang.  The patient is currently on ursodiol 300 mg twice a day for the cholestasis of pregnancy.  The patient was advised to follow-up with her hepatologist regarding the results of the blood work and abdominal ultrasound.\par Obesity: The patient is morbidly obese. The patient was advised to lose weight and exercise. The patient was advised to followup with a nutritionist regarding dieting for the weight loss. The patient cannot lose weight. The patient is being evaluated with bariatric surgeon. The patient is being followed for bariatric surgery for obesity with for possible gastric sleeve surgery after delivery. The patient agrees and will follow-up. I will try to obtain the prior upper endoscopy to assess results and any contraindications for bariatric surgery. The patient agreed and will followup.\par H. pylori Gastritis: The patient was found to have H. pylori gastritis on prior upper endoscopy. The patient completed a trial of Clarithromycin 500 mg 2 times a day, Amoxicillin 500mg 2 tablets twice a day and Omeprazole 40 mg twice a day for 14 days for H. pylori eradication. The patient is s/p treatment for H. pylori eradication but did not have followup testing to assess for eradication. The H. pylori breath test performed on January 9, 2023 was detected. I recommend a trial of antibiotics for H. pylori eradication after delivery. I recommend an H. pylori breath test to assess for eradication of the bacteria. \par Gastritis: The patient has a history of gastritis noted on prior upper endoscopy. The patient is to avoid nonsteroidal anti-inflammatory drugs and aspirin. I recommend continue on a trial of Famotidine 20 mg twice a day PRN for 3 months for the symptoms. \par Elevated Liver Enzymes and Pregnancy: The patient has elevated liver enzymes noted on prior blood work of unclear etiology. The patient denies any jaundice or pruritus. The patient denies any alcohol use. The patient denies taking large doses of nonsteroidal anti-inflammatory drugs or acetaminophen. I had a long discussion with the patient regarding the elevated liver enzymes and the possible progression of the liver disease during pregnancy. The patient was told of the possible increased risk of developing liver failure, ascites, renal impairment, ascites, GI bleeding, hepatic encephalopathy, bleeding tendencies and fetal demise. The patient was told of the importance of follow-up. The patient was advised to follow up every month for blood work. I recommend avoid alcohol and hepato-toxic agents. I recommend avoiding large doses of nonsteroidal anti-inflammatory drugs or acetaminophen. The patient agreed and will follow-up to reassess the symptoms. The patient is being followed by hepatologist, Dr. Jeffrey Hoang for the elevated liver enzymes in pregnancy.\par Fatty Liver: The patient had an imaging study suggestive of fatty infiltration of the liver. The patient denies any jaundice or pruritus. The patient denies any alcohol use. The patient denies taking large doses of nonsteroidal anti-inflammatory drugs or acetaminophen. The findings are suggestive of fatty liver. The patient and I had a long discussion regarding the risks of fatty liver and possible progression to cirrhosis. The patient was told of the possible increased risk of developing liver failure, cirrhosis, ascites, GI bleeding secondary to varices, hepatic encephalopathy, bleeding tendencies and liver cancer. The patient was told of the importance of follow-up. The patient was advised to follow up every 6 months for blood work and imaging studies. The patient agreed and will follow up. The patient was advised to lose weight and exercise. The patient was advised to abide by a Mediterranean diet. If the liver enzymes remain elevated, the patient may require a trial of Pioglitazone for the fatty liver. I recommend avoid alcohol and hepato-toxic agents. The patient was also advised to avoid NSAIDs, Acetaminophen and any other hepatotoxic drugs. The patient was also advised not to share needles, razors, scissors, nail clippers, etc.. The patient is to continue close follow-up in our office for blood work and exams. If the liver enzymes remain elevated, the patient may require a CT guided liver biopsy to assess the liver parenchyma for possible treatment. We had a long discussion regarding the risks and benefits of the procedure. The patient was told of the risks of bleeding, perforation, infections, emergency surgery and missing lesions. The patient agreed and will follow-up to reassess the symptoms. \par Blood Work: I recommend blood work to assess the patient's symptoms. I recommend a CBC, SMA 24, amylase, lipase, ESR, PT/INR/PTT and fibrinogen level.  I also recommend obtaining the recent blood work performed by the patient's PMD.\par Follow-up: The patient is to follow-up in the office in 4 weeks to reassess the symptoms. The patient was told to call the office if any further problems. \par \par \par

## 2023-04-10 NOTE — HISTORY OF PRESENT ILLNESS
[IV Drug Use] : no IV drug use [Tattoo] : no tattoos [Hemodialysis] : no hemodialysis [Transfusion before 1992] : no transfusion before 1992 [Transplant before 1992] : no transplant before 1992 [Alcohol Abuse] : no alcohol abuse [Autoimmune Disorder] : no autoimmune disorder [Travel to Endemic Area] : no travel to an endemic area [Occupational Exposure] : no occupational exposure [Cocaine Use] : no cocaine use [de-identified] : Hx of social alcohol (only at occasions, 1-2x a year). Cousin possibly had Hep B requiring liver transplant.   Born in [place].  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 Castañeda) for elevated liver enzymes in hepatocellular pattern, noted since at least 11/2022. She was found to have mildly elevated bile acids (12), and been treated with Ursodiol since beginning of this year, with improvement of her itching, but without improvement of her liver enzymes.  RUQ US 12/8/2022, but was not complete US and no Doppler.\par No complications during prior pregnancies, first pregnancy 2003, followed by 2006, 2011, 2013, 2019.      \par She does c/o acid reflux, and reports "lot of gas". \par

## 2023-04-11 ENCOUNTER — NON-APPOINTMENT (OUTPATIENT)
Age: 38
End: 2023-04-11

## 2023-04-11 LAB
ALBUMIN SERPL ELPH-MCNC: 3.5 G/DL
ALP BLD-CCNC: 120 U/L
ALT SERPL-CCNC: 50 U/L
AMYLASE/CREAT SERPL: 32 U/L
ANION GAP SERPL CALC-SCNC: 12 MMOL/L
APTT BLD: 29.8 SEC
AST SERPL-CCNC: 95 U/L
BASOPHILS # BLD AUTO: 0.03 K/UL
BASOPHILS NFR BLD AUTO: 0.4 %
BILIRUB SERPL-MCNC: 0.2 MG/DL
BUN SERPL-MCNC: 13 MG/DL
CALCIUM SERPL-MCNC: 9.3 MG/DL
CHLORIDE SERPL-SCNC: 104 MMOL/L
CHOLEST SERPL-MCNC: 236 MG/DL
CO2 SERPL-SCNC: 22 MMOL/L
CREAT SERPL-MCNC: 0.5 MG/DL
EGFR: 124 ML/MIN/1.73M2
EOSINOPHIL # BLD AUTO: 0.1 K/UL
EOSINOPHIL NFR BLD AUTO: 1.2 %
ERYTHROCYTE [SEDIMENTATION RATE] IN BLOOD BY WESTERGREN METHOD: 92 MM/HR
ESTIMATED AVERAGE GLUCOSE: 117 MG/DL
GGT SERPL-CCNC: 26 U/L
GLUCOSE SERPL-MCNC: 96 MG/DL
HBA1C MFR BLD HPLC: 5.7 %
HCT VFR BLD CALC: 36.4 %
HDLC SERPL-MCNC: 48 MG/DL
HGB BLD-MCNC: 11.7 G/DL
IMM GRANULOCYTES NFR BLD AUTO: 0.4 %
INR PPP: 0.99 RATIO
LDLC SERPL CALC-MCNC: 140 MG/DL
LPL SERPL-CCNC: 25 U/L
LYMPHOCYTES # BLD AUTO: 1.9 K/UL
LYMPHOCYTES NFR BLD AUTO: 23.3 %
MAN DIFF?: NORMAL
MCHC RBC-ENTMCNC: 29.3 PG
MCHC RBC-ENTMCNC: 32.1 GM/DL
MCV RBC AUTO: 91 FL
MONOCYTES # BLD AUTO: 0.67 K/UL
MONOCYTES NFR BLD AUTO: 8.2 %
NEUTROPHILS # BLD AUTO: 5.44 K/UL
NEUTROPHILS NFR BLD AUTO: 66.5 %
NONHDLC SERPL-MCNC: 189 MG/DL
PLATELET # BLD AUTO: 324 K/UL
POTASSIUM SERPL-SCNC: 4.4 MMOL/L
PROT SERPL-MCNC: 6.2 G/DL
PT BLD: 11.5 SEC
RBC # BLD: 4 M/UL
RBC # FLD: 15.1 %
SODIUM SERPL-SCNC: 138 MMOL/L
TRIGL SERPL-MCNC: 245 MG/DL
WBC # FLD AUTO: 8.17 K/UL

## 2023-04-12 ENCOUNTER — NON-APPOINTMENT (OUTPATIENT)
Age: 38
End: 2023-04-12

## 2023-04-14 ENCOUNTER — NON-APPOINTMENT (OUTPATIENT)
Age: 38
End: 2023-04-14

## 2023-04-14 LAB — FIBRINOGEN AG PPP IA-MCNC: 746 MG/DL

## 2023-04-17 ENCOUNTER — APPOINTMENT (OUTPATIENT)
Dept: MATERNAL FETAL MEDICINE | Facility: HOSPITAL | Age: 38
End: 2023-04-17

## 2023-04-17 ENCOUNTER — ASOB RESULT (OUTPATIENT)
Age: 38
End: 2023-04-17

## 2023-04-17 ENCOUNTER — APPOINTMENT (OUTPATIENT)
Dept: ANTEPARTUM | Facility: CLINIC | Age: 38
End: 2023-04-17
Payer: MEDICAID

## 2023-04-17 ENCOUNTER — APPOINTMENT (OUTPATIENT)
Dept: OBGYN | Facility: HOSPITAL | Age: 38
End: 2023-04-17
Payer: MEDICAID

## 2023-04-17 ENCOUNTER — OUTPATIENT (OUTPATIENT)
Dept: OUTPATIENT SERVICES | Facility: HOSPITAL | Age: 38
LOS: 1 days | End: 2023-04-17

## 2023-04-17 ENCOUNTER — NON-APPOINTMENT (OUTPATIENT)
Age: 38
End: 2023-04-17

## 2023-04-17 ENCOUNTER — RESULT REVIEW (OUTPATIENT)
Age: 38
End: 2023-04-17

## 2023-04-17 VITALS
WEIGHT: 293 LBS | TEMPERATURE: 98.1 F | BODY MASS INDEX: 55.32 KG/M2 | DIASTOLIC BLOOD PRESSURE: 80 MMHG | HEART RATE: 84 BPM | HEIGHT: 61 IN | SYSTOLIC BLOOD PRESSURE: 120 MMHG

## 2023-04-17 DIAGNOSIS — Z90.49 ACQUIRED ABSENCE OF OTHER SPECIFIED PARTS OF DIGESTIVE TRACT: Chronic | ICD-10-CM

## 2023-04-17 LAB
ALBUMIN SERPL ELPH-MCNC: 3.4 G/DL — SIGNIFICANT CHANGE UP (ref 3.3–5)
ALP SERPL-CCNC: 120 U/L — SIGNIFICANT CHANGE UP (ref 40–120)
ALT FLD-CCNC: 36 U/L — HIGH (ref 4–33)
ANION GAP SERPL CALC-SCNC: 11 MMOL/L — SIGNIFICANT CHANGE UP (ref 7–14)
AST SERPL-CCNC: 69 U/L — HIGH (ref 4–32)
BASOPHILS # BLD AUTO: 0.03 K/UL — SIGNIFICANT CHANGE UP (ref 0–0.2)
BASOPHILS NFR BLD AUTO: 0.3 % — SIGNIFICANT CHANGE UP (ref 0–2)
BILIRUB SERPL-MCNC: <0.2 MG/DL — SIGNIFICANT CHANGE UP (ref 0.2–1.2)
BUN SERPL-MCNC: 10 MG/DL — SIGNIFICANT CHANGE UP (ref 7–23)
CALCIUM SERPL-MCNC: 9.1 MG/DL — SIGNIFICANT CHANGE UP (ref 8.4–10.5)
CHLORIDE SERPL-SCNC: 105 MMOL/L — SIGNIFICANT CHANGE UP (ref 98–107)
CO2 SERPL-SCNC: 22 MMOL/L — SIGNIFICANT CHANGE UP (ref 22–31)
CREAT SERPL-MCNC: 0.55 MG/DL — SIGNIFICANT CHANGE UP (ref 0.5–1.3)
EGFR: 121 ML/MIN/1.73M2 — SIGNIFICANT CHANGE UP
EOSINOPHIL # BLD AUTO: 0.09 K/UL — SIGNIFICANT CHANGE UP (ref 0–0.5)
EOSINOPHIL NFR BLD AUTO: 1 % — SIGNIFICANT CHANGE UP (ref 0–6)
GLUCOSE SERPL-MCNC: 70 MG/DL — SIGNIFICANT CHANGE UP (ref 70–99)
HCT VFR BLD CALC: 37.4 % — SIGNIFICANT CHANGE UP (ref 34.5–45)
HGB BLD-MCNC: 11.9 G/DL — SIGNIFICANT CHANGE UP (ref 11.5–15.5)
HIV 1+2 AB+HIV1 P24 AG SERPL QL IA: SIGNIFICANT CHANGE UP
IANC: 5.95 K/UL — SIGNIFICANT CHANGE UP (ref 1.8–7.4)
IMM GRANULOCYTES NFR BLD AUTO: 0.3 % — SIGNIFICANT CHANGE UP (ref 0–0.9)
LYMPHOCYTES # BLD AUTO: 2.14 K/UL — SIGNIFICANT CHANGE UP (ref 1–3.3)
LYMPHOCYTES # BLD AUTO: 24 % — SIGNIFICANT CHANGE UP (ref 13–44)
MCHC RBC-ENTMCNC: 29 PG — SIGNIFICANT CHANGE UP (ref 27–34)
MCHC RBC-ENTMCNC: 31.8 GM/DL — LOW (ref 32–36)
MCV RBC AUTO: 91.2 FL — SIGNIFICANT CHANGE UP (ref 80–100)
MONOCYTES # BLD AUTO: 0.67 K/UL — SIGNIFICANT CHANGE UP (ref 0–0.9)
MONOCYTES NFR BLD AUTO: 7.5 % — SIGNIFICANT CHANGE UP (ref 2–14)
NEUTROPHILS # BLD AUTO: 5.95 K/UL — SIGNIFICANT CHANGE UP (ref 1.8–7.4)
NEUTROPHILS NFR BLD AUTO: 66.9 % — SIGNIFICANT CHANGE UP (ref 43–77)
NRBC # BLD: 0 /100 WBCS — SIGNIFICANT CHANGE UP (ref 0–0)
NRBC # FLD: 0 K/UL — SIGNIFICANT CHANGE UP (ref 0–0)
PLATELET # BLD AUTO: 306 K/UL — SIGNIFICANT CHANGE UP (ref 150–400)
POTASSIUM SERPL-MCNC: 4.1 MMOL/L — SIGNIFICANT CHANGE UP (ref 3.5–5.3)
POTASSIUM SERPL-SCNC: 4.1 MMOL/L — SIGNIFICANT CHANGE UP (ref 3.5–5.3)
PROT SERPL-MCNC: 6.6 G/DL — SIGNIFICANT CHANGE UP (ref 6–8.3)
RBC # BLD: 4.1 M/UL — SIGNIFICANT CHANGE UP (ref 3.8–5.2)
RBC # FLD: 15.3 % — HIGH (ref 10.3–14.5)
SODIUM SERPL-SCNC: 138 MMOL/L — SIGNIFICANT CHANGE UP (ref 135–145)
WBC # BLD: 8.91 K/UL — SIGNIFICANT CHANGE UP (ref 3.8–10.5)
WBC # FLD AUTO: 8.91 K/UL — SIGNIFICANT CHANGE UP (ref 3.8–10.5)

## 2023-04-17 PROCEDURE — 76819 FETAL BIOPHYS PROFIL W/O NST: CPT

## 2023-04-17 PROCEDURE — 99213 OFFICE O/P EST LOW 20 MIN: CPT | Mod: 25,GC

## 2023-04-18 DIAGNOSIS — O26.613 LIVER AND BILIARY TRACT DISORDERS IN PREGNANCY, THIRD TRIMESTER: ICD-10-CM

## 2023-04-18 DIAGNOSIS — O09.93 SUPERVISION OF HIGH RISK PREGNANCY, UNSPECIFIED, THIRD TRIMESTER: ICD-10-CM

## 2023-04-18 DIAGNOSIS — K21.9 GASTRO-ESOPHAGEAL REFLUX DISEASE WITHOUT ESOPHAGITIS: ICD-10-CM

## 2023-04-18 DIAGNOSIS — K76.0 FATTY (CHANGE OF) LIVER, NOT ELSEWHERE CLASSIFIED: ICD-10-CM

## 2023-04-18 DIAGNOSIS — O09.40 SUPERVISION OF PREGNANCY WITH GRAND MULTIPARITY, UNSPECIFIED TRIMESTER: ICD-10-CM

## 2023-04-18 DIAGNOSIS — R74.8 ABNORMAL LEVELS OF OTHER SERUM ENZYMES: ICD-10-CM

## 2023-04-18 DIAGNOSIS — O34.219 MATERNAL CARE FOR UNSPECIFIED TYPE SCAR FROM PREVIOUS CESAREAN DELIVERY: ICD-10-CM

## 2023-04-18 DIAGNOSIS — E66.01 MORBID (SEVERE) OBESITY DUE TO EXCESS CALORIES: ICD-10-CM

## 2023-04-19 ENCOUNTER — NON-APPOINTMENT (OUTPATIENT)
Age: 38
End: 2023-04-19

## 2023-04-19 LAB — BILE AC FLD-MCNC: 6.7 UMOL/L — SIGNIFICANT CHANGE UP (ref 0–10)

## 2023-04-20 ENCOUNTER — OUTPATIENT (OUTPATIENT)
Dept: OUTPATIENT SERVICES | Facility: HOSPITAL | Age: 38
LOS: 1 days | End: 2023-04-20

## 2023-04-20 VITALS
RESPIRATION RATE: 16 BRPM | WEIGHT: 293 LBS | HEART RATE: 84 BPM | TEMPERATURE: 98 F | SYSTOLIC BLOOD PRESSURE: 130 MMHG | HEIGHT: 61 IN | DIASTOLIC BLOOD PRESSURE: 80 MMHG | OXYGEN SATURATION: 97 %

## 2023-04-20 DIAGNOSIS — K76.0 FATTY (CHANGE OF) LIVER, NOT ELSEWHERE CLASSIFIED: ICD-10-CM

## 2023-04-20 DIAGNOSIS — G47.33 OBSTRUCTIVE SLEEP APNEA (ADULT) (PEDIATRIC): ICD-10-CM

## 2023-04-20 DIAGNOSIS — Z90.49 ACQUIRED ABSENCE OF OTHER SPECIFIED PARTS OF DIGESTIVE TRACT: Chronic | ICD-10-CM

## 2023-04-20 DIAGNOSIS — O34.219 MATERNAL CARE FOR UNSPECIFIED TYPE SCAR FROM PREVIOUS CESAREAN DELIVERY: ICD-10-CM

## 2023-04-20 DIAGNOSIS — E66.01 MORBID (SEVERE) OBESITY DUE TO EXCESS CALORIES: ICD-10-CM

## 2023-04-20 LAB
ALBUMIN SERPL ELPH-MCNC: 3.5 G/DL — SIGNIFICANT CHANGE UP (ref 3.3–5)
ALP SERPL-CCNC: 131 U/L — HIGH (ref 40–120)
ALT FLD-CCNC: 40 U/L — HIGH (ref 4–33)
ANION GAP SERPL CALC-SCNC: 13 MMOL/L — SIGNIFICANT CHANGE UP (ref 7–14)
APPEARANCE UR: ABNORMAL
AST SERPL-CCNC: 74 U/L — HIGH (ref 4–32)
BILIRUB SERPL-MCNC: <0.2 MG/DL — SIGNIFICANT CHANGE UP (ref 0.2–1.2)
BILIRUB UR-MCNC: NEGATIVE — SIGNIFICANT CHANGE UP
BLD GP AB SCN SERPL QL: NEGATIVE — SIGNIFICANT CHANGE UP
BUN SERPL-MCNC: 14 MG/DL — SIGNIFICANT CHANGE UP (ref 7–23)
CALCIUM SERPL-MCNC: 9.7 MG/DL — SIGNIFICANT CHANGE UP (ref 8.4–10.5)
CHLORIDE SERPL-SCNC: 104 MMOL/L — SIGNIFICANT CHANGE UP (ref 98–107)
CO2 SERPL-SCNC: 22 MMOL/L — SIGNIFICANT CHANGE UP (ref 22–31)
COLOR SPEC: YELLOW — SIGNIFICANT CHANGE UP
CREAT SERPL-MCNC: 0.58 MG/DL — SIGNIFICANT CHANGE UP (ref 0.5–1.3)
DIFF PNL FLD: ABNORMAL
EGFR: 119 ML/MIN/1.73M2 — SIGNIFICANT CHANGE UP
GLUCOSE SERPL-MCNC: 89 MG/DL — SIGNIFICANT CHANGE UP (ref 70–99)
GLUCOSE UR QL: NEGATIVE — SIGNIFICANT CHANGE UP
HCT VFR BLD CALC: 37.1 % — SIGNIFICANT CHANGE UP (ref 34.5–45)
HGB BLD-MCNC: 11.6 G/DL — SIGNIFICANT CHANGE UP (ref 11.5–15.5)
KETONES UR-MCNC: NEGATIVE — SIGNIFICANT CHANGE UP
LEUKOCYTE ESTERASE UR-ACNC: ABNORMAL
MCHC RBC-ENTMCNC: 28.4 PG — SIGNIFICANT CHANGE UP (ref 27–34)
MCHC RBC-ENTMCNC: 31.3 GM/DL — LOW (ref 32–36)
MCV RBC AUTO: 90.7 FL — SIGNIFICANT CHANGE UP (ref 80–100)
NITRITE UR-MCNC: NEGATIVE — SIGNIFICANT CHANGE UP
NRBC # BLD: 0 /100 WBCS — SIGNIFICANT CHANGE UP (ref 0–0)
NRBC # FLD: 0 K/UL — SIGNIFICANT CHANGE UP (ref 0–0)
PH UR: 6.5 — SIGNIFICANT CHANGE UP (ref 5–8)
PLATELET # BLD AUTO: 315 K/UL — SIGNIFICANT CHANGE UP (ref 150–400)
POTASSIUM SERPL-MCNC: 4.1 MMOL/L — SIGNIFICANT CHANGE UP (ref 3.5–5.3)
POTASSIUM SERPL-SCNC: 4.1 MMOL/L — SIGNIFICANT CHANGE UP (ref 3.5–5.3)
PROT SERPL-MCNC: 6.8 G/DL — SIGNIFICANT CHANGE UP (ref 6–8.3)
PROT UR-MCNC: ABNORMAL
RBC # BLD: 4.09 M/UL — SIGNIFICANT CHANGE UP (ref 3.8–5.2)
RBC # FLD: 15.3 % — HIGH (ref 10.3–14.5)
RH IG SCN BLD-IMP: POSITIVE — SIGNIFICANT CHANGE UP
SODIUM SERPL-SCNC: 139 MMOL/L — SIGNIFICANT CHANGE UP (ref 135–145)
SP GR SPEC: 1.03 — SIGNIFICANT CHANGE UP (ref 1.01–1.05)
UROBILINOGEN FLD QL: SIGNIFICANT CHANGE UP
WBC # BLD: 8.38 K/UL — SIGNIFICANT CHANGE UP (ref 3.8–10.5)
WBC # FLD AUTO: 8.38 K/UL — SIGNIFICANT CHANGE UP (ref 3.8–10.5)

## 2023-04-20 NOTE — OB PST NOTE - NSICDXPASTMEDICALHX_GEN_ALL_CORE_FT
PAST MEDICAL HISTORY:  Fatty liver     H/O sleep apnea uses device at night. Done with [employer] -[phone]    Liver and biliary tract disorders in pregnancy, third trimester     Morbid obesity      PAST MEDICAL HISTORY:  Cholestasis of pregnancy     Fatty liver     GERD (gastroesophageal reflux disease)     Liver and biliary tract disorders in pregnancy, third trimester     Morbid obesity     SARAH (obstructive sleep apnea)

## 2023-04-20 NOTE — OB PST NOTE - HISTORY OF PRESENT ILLNESS
37 year old pregnant female PMH morbid obesity, fatty liver, cholestasis during pregnancy, GERD, SARAH presents to presurgical testing scheduled for repeat Caesarean section and bilateral tubal ligation. Patient denies vaginal  bleeding, spotting or leakage of amniotic fluid. Patient denies contractions. Patient reports positive fetal movement. Pt was undergoing testing for bariatric surgery when she found out she was pregnant.

## 2023-04-20 NOTE — OB PST NOTE - ENERGY EXPENDITURE (METS)
1. Stomach endoscopy exam is about a 2 hr hospital visit; the procedure takes 15-20 minutes. This is called \"EGD\" or stomach endoscopy examination, possibly with \"esophageal dilation. \"  2.  IF we can start an IV, would give some sedation immediately, g 6

## 2023-04-20 NOTE — OB PST NOTE - PROBLEM SELECTOR PLAN 1
Patient tentatively scheduled for repeat  and bilateral tubal ligation for 23. Pre-op instructions provided. Pt given verbal and written instructions with teach back on chlorhexidine shampoo, gatorade, and pain management/anesthesia video. Pt instructed to follow OB-GYN instructions related to ASA preop. Pt verbalized understanding with return demonstration.    Copy of ekg and echo in chart.  Copy of GI and cardiac note in chart.    CBC CMP T&S UA done.

## 2023-04-20 NOTE — OB PST NOTE - NSHPREVIEWOFSYSTEMS_GEN_ALL_CORE
General: no fever, chills, sweating, anorexia, or weight loss    Skin: no rashes, itching, or dryness    Breast: no tenderness, lumps, or nipple discharge    Ophtalmologic: no diplopia, photophobia, or blurred vision    ENMT: no hearing difficulty, ear pain, tinnitus, or vertigo. No sinus symptoms, nasal congestion, nasal discharge, or nasal obstruction    Respiratory and Thorax: no wheezing, dyspnea, or cough    Cardiovascular: no chest pain, palpitations, dyspnea on exertion, orthopnea, or peripheral edema    Gastrointestinal: no nausea, vomiting, diarrhea, constipation, change in bowel movements, or abdominal pain    Genitourinary and Pelvis: no hematuria, renal colic, flank pain, dysuria, nocturia. No abnormal vaginal bleeding, vaginal discharge, spotting, pelvic pain, or vaginal leakage    Musculoskeletal: no arthralgia, arthritis, joint swelling, muscle cramping, muscle weakness, neck pain, arm pain, or leg pain    Neurological: no transient paralysis, weakness, paresthesias, or seizures. No syncope, tremors, vertigo, loss of sensation, difficulty walking, loss of consciousness    Psychiatric: no suicidal ideation, depression, anxiety    Hematology: no nose bleeding, easy bruising or bleeding, or skin lumps    Lymphatic: no enlarged or tender lymph nodes, or extremity swelling    Endocrine: no heat or cold intolerance    Immunologic: no recurrent or persistent infections General: no fever, chills, sweating, anorexia, or weight loss    Skin: +itching, no rashes, or dryness    Breast: no tenderness, lumps, or nipple discharge    Ophtalmologic: no diplopia, photophobia, or blurred vision    ENMT: no hearing difficulty, ear pain, tinnitus, or vertigo. No sinus symptoms, nasal congestion, nasal discharge, or nasal obstruction    Respiratory and Thorax: no wheezing, dyspnea, or cough    Cardiovascular: no chest pain, palpitations, dyspnea on exertion, orthopnea, or peripheral edema    Gastrointestinal: +acid reflux, cholestasis, no nausea, vomiting, diarrhea, constipation, change in bowel movements, or abdominal pain    Genitourinary and Pelvis: no hematuria, renal colic, flank pain, dysuria, nocturia. No abnormal vaginal bleeding, vaginal discharge, spotting, pelvic pain, or vaginal leakage    Musculoskeletal: +lower back pain, no arthralgia, arthritis, joint swelling, muscle cramping, muscle weakness, neck pain, arm pain, or leg pain    Neurological: no transient paralysis, weakness, paresthesias, or seizures. No syncope, tremors, vertigo, loss of sensation, difficulty walking, loss of consciousness    Psychiatric: no suicidal ideation, depression, anxiety    Hematology: no nose bleeding, easy bruising or bleeding, or skin lumps    Lymphatic: no enlarged or tender lymph nodes, or extremity swelling    Endocrine: no heat or cold intolerance    Immunologic: no recurrent or persistent infections

## 2023-04-24 ENCOUNTER — APPOINTMENT (OUTPATIENT)
Dept: ANTEPARTUM | Facility: CLINIC | Age: 38
End: 2023-04-24
Payer: MEDICAID

## 2023-04-24 ENCOUNTER — APPOINTMENT (OUTPATIENT)
Dept: MATERNAL FETAL MEDICINE | Facility: HOSPITAL | Age: 38
End: 2023-04-24

## 2023-04-24 ENCOUNTER — APPOINTMENT (OUTPATIENT)
Dept: ANTEPARTUM | Facility: CLINIC | Age: 38
End: 2023-04-24

## 2023-04-24 ENCOUNTER — APPOINTMENT (OUTPATIENT)
Dept: OBGYN | Facility: HOSPITAL | Age: 38
End: 2023-04-24

## 2023-04-24 ENCOUNTER — ASOB RESULT (OUTPATIENT)
Age: 38
End: 2023-04-24

## 2023-04-24 ENCOUNTER — OUTPATIENT (OUTPATIENT)
Dept: INPATIENT UNIT | Facility: HOSPITAL | Age: 38
LOS: 1 days | Discharge: HOME | End: 2023-04-24
Payer: MEDICAID

## 2023-04-24 VITALS
SYSTOLIC BLOOD PRESSURE: 135 MMHG | DIASTOLIC BLOOD PRESSURE: 68 MMHG | RESPIRATION RATE: 18 BRPM | TEMPERATURE: 99 F | HEART RATE: 88 BPM

## 2023-04-24 VITALS — SYSTOLIC BLOOD PRESSURE: 135 MMHG | DIASTOLIC BLOOD PRESSURE: 67 MMHG | HEART RATE: 83 BPM

## 2023-04-24 DIAGNOSIS — O26.899 OTHER SPECIFIED PREGNANCY RELATED CONDITIONS, UNSPECIFIED TRIMESTER: ICD-10-CM

## 2023-04-24 DIAGNOSIS — Z90.49 ACQUIRED ABSENCE OF OTHER SPECIFIED PARTS OF DIGESTIVE TRACT: Chronic | ICD-10-CM

## 2023-04-24 DIAGNOSIS — Z98.891 HISTORY OF UTERINE SCAR FROM PREVIOUS SURGERY: Chronic | ICD-10-CM

## 2023-04-24 PROBLEM — K76.0 FATTY (CHANGE OF) LIVER, NOT ELSEWHERE CLASSIFIED: Chronic | Status: ACTIVE | Noted: 2023-04-20

## 2023-04-24 PROBLEM — G47.33 OBSTRUCTIVE SLEEP APNEA (ADULT) (PEDIATRIC): Chronic | Status: ACTIVE | Noted: 2023-04-20

## 2023-04-24 PROBLEM — O26.613 LIVER AND BILIARY TRACT DISORDERS IN PREGNANCY, THIRD TRIMESTER: Chronic | Status: ACTIVE | Noted: 2023-04-20

## 2023-04-24 PROBLEM — K21.9 GASTRO-ESOPHAGEAL REFLUX DISEASE WITHOUT ESOPHAGITIS: Chronic | Status: ACTIVE | Noted: 2023-04-20

## 2023-04-24 PROBLEM — O26.619 LIVER AND BILIARY TRACT DISORDERS IN PREGNANCY, UNSPECIFIED TRIMESTER: Chronic | Status: ACTIVE | Noted: 2023-04-20

## 2023-04-24 PROCEDURE — 99221 1ST HOSP IP/OBS SF/LOW 40: CPT

## 2023-04-24 PROCEDURE — 76818 FETAL BIOPHYS PROFILE W/NST: CPT

## 2023-04-24 PROCEDURE — 76816 OB US FOLLOW-UP PER FETUS: CPT

## 2023-04-24 RX ORDER — URSODIOL 250 MG/1
1 TABLET, FILM COATED ORAL
Refills: 0 | DISCHARGE

## 2023-04-24 NOTE — OB PROVIDER TRIAGE NOTE - NSHPPHYSICALEXAM_GEN_ALL_CORE
Assessment reveals VSS, abdomen soft, NT, obese.  For fetal heart tracing. Assessment reveals VSS, abdomen soft, NT, obese.  For fetal heart tracing.    1420: Cat 1 FHT, no ctx on toco  Patient reports feeling GFM at this time.   Dr. Torres in to discuss plan of care.   Will continue to monitor. Assessment reveals VSS, abdomen soft, NT, obese.  For fetal heart tracing.    1420: Cat 1 FHT, no ctx on toco  Patient reports feeling GFM at this time.   Dr. Torres in to discuss plan of care.   Will continue to monitor.    1540:  Cat 1 FHT, no ctx on toco  Reviewed by Dr. Torres

## 2023-04-24 NOTE — OB RN TRIAGE NOTE - FALL HARM RISK - UNIVERSAL INTERVENTIONS
Bed in lowest position, wheels locked, appropriate side rails in place/Call bell, personal items and telephone in reach/Instruct patient to call for assistance before getting out of bed or chair/Non-slip footwear when patient is out of bed/[place] to call system/Physically safe environment - no spills, clutter or unnecessary equipment/Purposeful Proactive Rounding/Room/bathroom lighting operational, light cord in reach

## 2023-04-24 NOTE — OB RN TRIAGE NOTE - NSICDXPASTMEDICALHX_GEN_ALL_CORE_FT
PAST MEDICAL HISTORY:  Cholestasis of pregnancy     Fatty liver     GERD (gastroesophageal reflux disease)     Liver and biliary tract disorders in pregnancy, third trimester     Morbid obesity     SARAH (obstructive sleep apnea)

## 2023-04-24 NOTE — OB PROVIDER TRIAGE NOTE - NS_OBGYNHISTORY_OBGYN_ALL_OB_FT
TOP x2  2003  7+  2006  7+  2011  7+  2013 primary  NRFHT 7+  2019 repeat  6+    AP course complicated by:  Fatty liver  Cholestasis with elevated liver enzymes- 23- /40- Ursidiol 500mg BID    BPP in ATU , EFW 3336g

## 2023-04-24 NOTE — OB PROVIDER TRIAGE NOTE - NSOBPROVIDERNOTE_OBGYN_ALL_OB_FT
Plan D/W Dr. Torres, normal fetal testing.   Tracy sotomayor reviewed  Call clinic today to make follow up appointment.

## 2023-04-24 NOTE — OB RN TRIAGE NOTE - CHIEF COMPLAINT QUOTE
sent from ATU [facility] with decreased fetal movement sent from ATU [facility] with decreased fetal movement  *scheduled repeat c/s 5/4*

## 2023-04-24 NOTE — OB PROVIDER TRIAGE NOTE - HISTORY OF PRESENT ILLNESS
36yo  @ . presents sent from ATU for evaluation for NRNST. Patient reports decreased fetal movement today.   Denies LOF, VB, ctx.   AP course complicated by: Fatty liver, cholestasis of pregnancy.  For repeat      H/o Obesity- BMI 56   Cholecystectomy  C/S x 2

## 2023-04-24 NOTE — OB RN TRIAGE NOTE - NSICDXPASTSURGICALHX_GEN_ALL_CORE_FT
PAST SURGICAL HISTORY:  S/P laparoscopic cholecystectomy 2004 or 2005     PAST SURGICAL HISTORY:  History of  section     S/P laparoscopic cholecystectomy  or

## 2023-04-25 DIAGNOSIS — K83.1 OBSTRUCTION OF BILE DUCT: ICD-10-CM

## 2023-04-25 DIAGNOSIS — O09.523 SUPERVISION OF ELDERLY MULTIGRAVIDA, THIRD TRIMESTER: ICD-10-CM

## 2023-04-25 DIAGNOSIS — O09.43 SUPERVISION OF PREGNANCY WITH GRAND MULTIPARITY, THIRD TRIMESTER: ICD-10-CM

## 2023-04-25 DIAGNOSIS — O99.353 DISEASES OF THE NERVOUS SYSTEM COMPLICATING PREGNANCY, THIRD TRIMESTER: ICD-10-CM

## 2023-04-25 DIAGNOSIS — G47.33 OBSTRUCTIVE SLEEP APNEA (ADULT) (PEDIATRIC): ICD-10-CM

## 2023-04-25 DIAGNOSIS — E66.9 OBESITY, UNSPECIFIED: ICD-10-CM

## 2023-04-25 DIAGNOSIS — O36.8130 DECREASED FETAL MOVEMENTS, THIRD TRIMESTER, NOT APPLICABLE OR UNSPECIFIED: ICD-10-CM

## 2023-04-25 DIAGNOSIS — Z3A.36 36 WEEKS GESTATION OF PREGNANCY: ICD-10-CM

## 2023-04-25 DIAGNOSIS — O99.613 DISEASES OF THE DIGESTIVE SYSTEM COMPLICATING PREGNANCY, THIRD TRIMESTER: ICD-10-CM

## 2023-04-25 DIAGNOSIS — O34.219 MATERNAL CARE FOR UNSPECIFIED TYPE SCAR FROM PREVIOUS CESAREAN DELIVERY: ICD-10-CM

## 2023-04-25 DIAGNOSIS — O99.213 OBESITY COMPLICATING PREGNANCY, THIRD TRIMESTER: ICD-10-CM

## 2023-04-25 DIAGNOSIS — O26.613 LIVER AND BILIARY TRACT DISORDERS IN PREGNANCY, THIRD TRIMESTER: ICD-10-CM

## 2023-04-25 DIAGNOSIS — K21.9 GASTRO-ESOPHAGEAL REFLUX DISEASE WITHOUT ESOPHAGITIS: ICD-10-CM

## 2023-04-25 DIAGNOSIS — K76.0 FATTY (CHANGE OF) LIVER, NOT ELSEWHERE CLASSIFIED: ICD-10-CM

## 2023-04-27 ENCOUNTER — NON-APPOINTMENT (OUTPATIENT)
Age: 38
End: 2023-04-27

## 2023-04-30 ENCOUNTER — TRANSCRIPTION ENCOUNTER (OUTPATIENT)
Age: 38
End: 2023-04-30

## 2023-05-01 ENCOUNTER — NON-APPOINTMENT (OUTPATIENT)
Age: 38
End: 2023-05-01

## 2023-05-01 ENCOUNTER — RESULT REVIEW (OUTPATIENT)
Age: 38
End: 2023-05-01

## 2023-05-01 ENCOUNTER — APPOINTMENT (OUTPATIENT)
Dept: ANTEPARTUM | Facility: CLINIC | Age: 38
End: 2023-05-01

## 2023-05-01 ENCOUNTER — APPOINTMENT (OUTPATIENT)
Dept: OBGYN | Facility: HOSPITAL | Age: 38
End: 2023-05-01
Payer: MEDICAID

## 2023-05-01 ENCOUNTER — OUTPATIENT (OUTPATIENT)
Dept: OUTPATIENT SERVICES | Facility: HOSPITAL | Age: 38
LOS: 1 days | End: 2023-05-01

## 2023-05-01 ENCOUNTER — INPATIENT (INPATIENT)
Facility: HOSPITAL | Age: 38
LOS: 2 days | Discharge: HOME | End: 2023-05-04
Attending: SPECIALIST | Admitting: SPECIALIST
Payer: MEDICAID

## 2023-05-01 VITALS
HEART RATE: 82 BPM | SYSTOLIC BLOOD PRESSURE: 130 MMHG | WEIGHT: 293 LBS | DIASTOLIC BLOOD PRESSURE: 76 MMHG | TEMPERATURE: 98.1 F | HEIGHT: 61 IN | BODY MASS INDEX: 55.32 KG/M2

## 2023-05-01 VITALS
SYSTOLIC BLOOD PRESSURE: 145 MMHG | TEMPERATURE: 98 F | RESPIRATION RATE: 17 BRPM | DIASTOLIC BLOOD PRESSURE: 80 MMHG | HEART RATE: 80 BPM

## 2023-05-01 DIAGNOSIS — O09.93 SUPERVISION OF HIGH RISK PREGNANCY, UNSPECIFIED, THIRD TRIMESTER: ICD-10-CM

## 2023-05-01 DIAGNOSIS — O26.899 OTHER SPECIFIED PREGNANCY RELATED CONDITIONS, UNSPECIFIED TRIMESTER: ICD-10-CM

## 2023-05-01 DIAGNOSIS — O26.613 LIVER AND BILIARY TRACT DISORDERS IN PREGNANCY, THIRD TRIMESTER: ICD-10-CM

## 2023-05-01 DIAGNOSIS — E66.01 MORBID (SEVERE) OBESITY DUE TO EXCESS CALORIES: ICD-10-CM

## 2023-05-01 DIAGNOSIS — K76.0 FATTY (CHANGE OF) LIVER, NOT ELSEWHERE CLASSIFIED: ICD-10-CM

## 2023-05-01 DIAGNOSIS — O16.9 UNSPECIFIED MATERNAL HYPERTENSION, UNSPECIFIED TRIMESTER: ICD-10-CM

## 2023-05-01 DIAGNOSIS — Z98.890 OTHER SPECIFIED POSTPROCEDURAL STATES: Chronic | ICD-10-CM

## 2023-05-01 DIAGNOSIS — Z98.891 HISTORY OF UTERINE SCAR FROM PREVIOUS SURGERY: Chronic | ICD-10-CM

## 2023-05-01 DIAGNOSIS — Z90.49 ACQUIRED ABSENCE OF OTHER SPECIFIED PARTS OF DIGESTIVE TRACT: Chronic | ICD-10-CM

## 2023-05-01 DIAGNOSIS — R74.8 ABNORMAL LEVELS OF OTHER SERUM ENZYMES: ICD-10-CM

## 2023-05-01 DIAGNOSIS — R06.2 WHEEZING: ICD-10-CM

## 2023-05-01 DIAGNOSIS — O09.522 SUPERVISION OF ELDERLY MULTIGRAVIDA, SECOND TRIMESTER: ICD-10-CM

## 2023-05-01 LAB
ALBUMIN SERPL ELPH-MCNC: 2.9 G/DL — LOW (ref 3.3–5)
ALBUMIN SERPL ELPH-MCNC: 3.2 G/DL — LOW (ref 3.3–5)
ALP SERPL-CCNC: 122 U/L — HIGH (ref 40–120)
ALP SERPL-CCNC: 131 U/L — HIGH (ref 40–120)
ALT FLD-CCNC: 35 U/L — HIGH (ref 4–33)
ALT FLD-CCNC: 38 U/L — HIGH (ref 4–33)
ANION GAP SERPL CALC-SCNC: 13 MMOL/L — SIGNIFICANT CHANGE UP (ref 7–14)
ANION GAP SERPL CALC-SCNC: 15 MMOL/L — HIGH (ref 7–14)
APPEARANCE UR: ABNORMAL
APTT BLD: 27.1 SEC — SIGNIFICANT CHANGE UP (ref 27–36.3)
APTT BLD: 28.5 SEC — SIGNIFICANT CHANGE UP (ref 27–36.3)
AST SERPL-CCNC: 60 U/L — HIGH (ref 4–32)
AST SERPL-CCNC: 69 U/L — HIGH (ref 4–32)
BACTERIA # UR AUTO: ABNORMAL
BASOPHILS # BLD AUTO: 0.03 K/UL — SIGNIFICANT CHANGE UP (ref 0–0.2)
BASOPHILS # BLD AUTO: 0.04 K/UL — SIGNIFICANT CHANGE UP (ref 0–0.2)
BASOPHILS NFR BLD AUTO: 0.2 % — SIGNIFICANT CHANGE UP (ref 0–2)
BASOPHILS NFR BLD AUTO: 0.5 % — SIGNIFICANT CHANGE UP (ref 0–2)
BILIRUB SERPL-MCNC: <0.2 MG/DL — SIGNIFICANT CHANGE UP (ref 0.2–1.2)
BILIRUB SERPL-MCNC: <0.2 MG/DL — SIGNIFICANT CHANGE UP (ref 0.2–1.2)
BILIRUB UR-MCNC: NEGATIVE — SIGNIFICANT CHANGE UP
BLD GP AB SCN SERPL QL: NEGATIVE — SIGNIFICANT CHANGE UP
BUN SERPL-MCNC: 10 MG/DL — SIGNIFICANT CHANGE UP (ref 7–23)
BUN SERPL-MCNC: 12 MG/DL — SIGNIFICANT CHANGE UP (ref 7–23)
CALCIUM SERPL-MCNC: 8.6 MG/DL — SIGNIFICANT CHANGE UP (ref 8.4–10.5)
CALCIUM SERPL-MCNC: 8.7 MG/DL — SIGNIFICANT CHANGE UP (ref 8.4–10.5)
CHLORIDE SERPL-SCNC: 107 MMOL/L — SIGNIFICANT CHANGE UP (ref 98–107)
CHLORIDE SERPL-SCNC: 107 MMOL/L — SIGNIFICANT CHANGE UP (ref 98–107)
CO2 SERPL-SCNC: 19 MMOL/L — LOW (ref 22–31)
CO2 SERPL-SCNC: 21 MMOL/L — LOW (ref 22–31)
COLOR SPEC: YELLOW — SIGNIFICANT CHANGE UP
COVID-19 SPIKE DOMAIN AB INTERP: POSITIVE
COVID-19 SPIKE DOMAIN ANTIBODY RESULT: >250 U/ML — HIGH
CREAT ?TM UR-MCNC: 153 MG/DL — SIGNIFICANT CHANGE UP
CREAT SERPL-MCNC: 0.51 MG/DL — SIGNIFICANT CHANGE UP (ref 0.5–1.3)
CREAT SERPL-MCNC: 0.62 MG/DL — SIGNIFICANT CHANGE UP (ref 0.5–1.3)
DIFF PNL FLD: ABNORMAL
EGFR: 118 ML/MIN/1.73M2 — SIGNIFICANT CHANGE UP
EGFR: 123 ML/MIN/1.73M2 — SIGNIFICANT CHANGE UP
EOSINOPHIL # BLD AUTO: 0.01 K/UL — SIGNIFICANT CHANGE UP (ref 0–0.5)
EOSINOPHIL # BLD AUTO: 0.05 K/UL — SIGNIFICANT CHANGE UP (ref 0–0.5)
EOSINOPHIL NFR BLD AUTO: 0.1 % — SIGNIFICANT CHANGE UP (ref 0–6)
EOSINOPHIL NFR BLD AUTO: 0.7 % — SIGNIFICANT CHANGE UP (ref 0–6)
EPI CELLS # UR: 11 /HPF — HIGH (ref 0–5)
FIBRINOGEN PPP-MCNC: 610 MG/DL — HIGH (ref 200–465)
FIBRINOGEN PPP-MCNC: 628 MG/DL — HIGH (ref 200–465)
GLUCOSE SERPL-MCNC: 103 MG/DL — HIGH (ref 70–99)
GLUCOSE SERPL-MCNC: 104 MG/DL — HIGH (ref 70–99)
GLUCOSE UR QL: NEGATIVE — SIGNIFICANT CHANGE UP
HCT VFR BLD CALC: 35.2 % — SIGNIFICANT CHANGE UP (ref 34.5–45)
HCT VFR BLD CALC: 36.5 % — SIGNIFICANT CHANGE UP (ref 34.5–45)
HGB BLD-MCNC: 11.4 G/DL — LOW (ref 11.5–15.5)
HGB BLD-MCNC: 11.7 G/DL — SIGNIFICANT CHANGE UP (ref 11.5–15.5)
HYALINE CASTS # UR AUTO: 7 /LPF — SIGNIFICANT CHANGE UP (ref 0–7)
IANC: 11.58 K/UL — HIGH (ref 1.8–7.4)
IANC: 4.83 K/UL — SIGNIFICANT CHANGE UP (ref 1.8–7.4)
IMM GRANULOCYTES NFR BLD AUTO: 0.4 % — SIGNIFICANT CHANGE UP (ref 0–0.9)
IMM GRANULOCYTES NFR BLD AUTO: 0.5 % — SIGNIFICANT CHANGE UP (ref 0–0.9)
INR BLD: 0.98 RATIO — SIGNIFICANT CHANGE UP (ref 0.88–1.16)
INR BLD: 1 RATIO — SIGNIFICANT CHANGE UP (ref 0.88–1.16)
KETONES UR-MCNC: NEGATIVE — SIGNIFICANT CHANGE UP
LDH SERPL L TO P-CCNC: 175 U/L — SIGNIFICANT CHANGE UP (ref 135–225)
LDH SERPL L TO P-CCNC: 207 U/L — SIGNIFICANT CHANGE UP (ref 135–225)
LEUKOCYTE ESTERASE UR-ACNC: ABNORMAL
LYMPHOCYTES # BLD AUTO: 1.27 K/UL — SIGNIFICANT CHANGE UP (ref 1–3.3)
LYMPHOCYTES # BLD AUTO: 2.07 K/UL — SIGNIFICANT CHANGE UP (ref 1–3.3)
LYMPHOCYTES # BLD AUTO: 27.2 % — SIGNIFICANT CHANGE UP (ref 13–44)
LYMPHOCYTES # BLD AUTO: 9.5 % — LOW (ref 13–44)
MCHC RBC-ENTMCNC: 28.9 PG — SIGNIFICANT CHANGE UP (ref 27–34)
MCHC RBC-ENTMCNC: 29.5 PG — SIGNIFICANT CHANGE UP (ref 27–34)
MCHC RBC-ENTMCNC: 32.1 GM/DL — SIGNIFICANT CHANGE UP (ref 32–36)
MCHC RBC-ENTMCNC: 32.4 GM/DL — SIGNIFICANT CHANGE UP (ref 32–36)
MCV RBC AUTO: 89.1 FL — SIGNIFICANT CHANGE UP (ref 80–100)
MCV RBC AUTO: 91.9 FL — SIGNIFICANT CHANGE UP (ref 80–100)
MONOCYTES # BLD AUTO: 0.46 K/UL — SIGNIFICANT CHANGE UP (ref 0–0.9)
MONOCYTES # BLD AUTO: 0.6 K/UL — SIGNIFICANT CHANGE UP (ref 0–0.9)
MONOCYTES NFR BLD AUTO: 3.4 % — SIGNIFICANT CHANGE UP (ref 2–14)
MONOCYTES NFR BLD AUTO: 7.9 % — SIGNIFICANT CHANGE UP (ref 2–14)
NEUTROPHILS # BLD AUTO: 11.58 K/UL — HIGH (ref 1.8–7.4)
NEUTROPHILS # BLD AUTO: 4.83 K/UL — SIGNIFICANT CHANGE UP (ref 1.8–7.4)
NEUTROPHILS NFR BLD AUTO: 63.3 % — SIGNIFICANT CHANGE UP (ref 43–77)
NEUTROPHILS NFR BLD AUTO: 86.3 % — HIGH (ref 43–77)
NITRITE UR-MCNC: NEGATIVE — SIGNIFICANT CHANGE UP
NRBC # BLD: 0 /100 WBCS — SIGNIFICANT CHANGE UP (ref 0–0)
NRBC # BLD: 0 /100 WBCS — SIGNIFICANT CHANGE UP (ref 0–0)
NRBC # FLD: 0 K/UL — SIGNIFICANT CHANGE UP (ref 0–0)
NRBC # FLD: 0.02 K/UL — HIGH (ref 0–0)
PH UR: 6.5 — SIGNIFICANT CHANGE UP (ref 5–8)
PLATELET # BLD AUTO: 268 K/UL — SIGNIFICANT CHANGE UP (ref 150–400)
PLATELET # BLD AUTO: 276 K/UL — SIGNIFICANT CHANGE UP (ref 150–400)
POTASSIUM SERPL-MCNC: 3.9 MMOL/L — SIGNIFICANT CHANGE UP (ref 3.5–5.3)
POTASSIUM SERPL-MCNC: 4.5 MMOL/L — SIGNIFICANT CHANGE UP (ref 3.5–5.3)
POTASSIUM SERPL-SCNC: 3.9 MMOL/L — SIGNIFICANT CHANGE UP (ref 3.5–5.3)
POTASSIUM SERPL-SCNC: 4.5 MMOL/L — SIGNIFICANT CHANGE UP (ref 3.5–5.3)
PROT ?TM UR-MCNC: 548 MG/DL — SIGNIFICANT CHANGE UP
PROT ?TM UR-MCNC: 548 MG/DL — SIGNIFICANT CHANGE UP
PROT SERPL-MCNC: 6 G/DL — SIGNIFICANT CHANGE UP (ref 6–8.3)
PROT SERPL-MCNC: 6.2 G/DL — SIGNIFICANT CHANGE UP (ref 6–8.3)
PROT UR-MCNC: ABNORMAL
PROT/CREAT UR-RTO: 3.6 RATIO — HIGH (ref 0–0.2)
PROTHROM AB SERPL-ACNC: 11.4 SEC — SIGNIFICANT CHANGE UP (ref 10.5–13.4)
PROTHROM AB SERPL-ACNC: 11.6 SEC — SIGNIFICANT CHANGE UP (ref 10.5–13.4)
RBC # BLD: 3.95 M/UL — SIGNIFICANT CHANGE UP (ref 3.8–5.2)
RBC # BLD: 3.97 M/UL — SIGNIFICANT CHANGE UP (ref 3.8–5.2)
RBC # FLD: 15.6 % — HIGH (ref 10.3–14.5)
RBC # FLD: 15.9 % — HIGH (ref 10.3–14.5)
RBC CASTS # UR COMP ASSIST: 5 /HPF — HIGH (ref 0–4)
RH IG SCN BLD-IMP: POSITIVE — SIGNIFICANT CHANGE UP
SARS-COV-2 IGG+IGM SERPL QL IA: >250 U/ML — HIGH
SARS-COV-2 IGG+IGM SERPL QL IA: POSITIVE
SODIUM SERPL-SCNC: 139 MMOL/L — SIGNIFICANT CHANGE UP (ref 135–145)
SODIUM SERPL-SCNC: 143 MMOL/L — SIGNIFICANT CHANGE UP (ref 135–145)
SP GR SPEC: 1.03 — SIGNIFICANT CHANGE UP (ref 1.01–1.05)
URATE SERPL-MCNC: 6 MG/DL — SIGNIFICANT CHANGE UP (ref 2.5–7)
URATE SERPL-MCNC: 6.3 MG/DL — SIGNIFICANT CHANGE UP (ref 2.5–7)
UROBILINOGEN FLD QL: SIGNIFICANT CHANGE UP
WBC # BLD: 13.42 K/UL — HIGH (ref 3.8–10.5)
WBC # BLD: 7.62 K/UL — SIGNIFICANT CHANGE UP (ref 3.8–10.5)
WBC # FLD AUTO: 13.42 K/UL — HIGH (ref 3.8–10.5)
WBC # FLD AUTO: 7.62 K/UL — SIGNIFICANT CHANGE UP (ref 3.8–10.5)
WBC UR QL: 35 /HPF — HIGH (ref 0–5)

## 2023-05-01 PROCEDURE — 99213 OFFICE O/P EST LOW 20 MIN: CPT | Mod: 25,GC

## 2023-05-01 PROCEDURE — 88302 TISSUE EXAM BY PATHOLOGIST: CPT | Mod: 26

## 2023-05-01 PROCEDURE — 59514 CESAREAN DELIVERY ONLY: CPT | Mod: U9,UB,GC

## 2023-05-01 DEVICE — INTERCEED 3 X 4": Type: IMPLANTABLE DEVICE | Status: FUNCTIONAL

## 2023-05-01 DEVICE — VISTASEAL FIBRIN HUMAN 10ML: Type: IMPLANTABLE DEVICE | Status: FUNCTIONAL

## 2023-05-01 RX ORDER — SIMETHICONE 80 MG/1
80 TABLET, CHEWABLE ORAL EVERY 4 HOURS
Refills: 0 | Status: DISCONTINUED | OUTPATIENT
Start: 2023-05-01 | End: 2023-05-04

## 2023-05-01 RX ORDER — MAGNESIUM HYDROXIDE 400 MG/1
30 TABLET, CHEWABLE ORAL
Refills: 0 | Status: DISCONTINUED | OUTPATIENT
Start: 2023-05-01 | End: 2023-05-04

## 2023-05-01 RX ORDER — LANOLIN
1 OINTMENT (GRAM) TOPICAL EVERY 6 HOURS
Refills: 0 | Status: DISCONTINUED | OUTPATIENT
Start: 2023-05-01 | End: 2023-05-04

## 2023-05-01 RX ORDER — KETOROLAC TROMETHAMINE 30 MG/ML
30 SYRINGE (ML) INJECTION EVERY 6 HOURS
Refills: 0 | Status: DISCONTINUED | OUTPATIENT
Start: 2023-05-01 | End: 2023-05-02

## 2023-05-01 RX ORDER — OXYTOCIN 10 UNIT/ML
333.33 VIAL (ML) INJECTION
Qty: 20 | Refills: 0 | Status: DISCONTINUED | OUTPATIENT
Start: 2023-05-01 | End: 2023-05-01

## 2023-05-01 RX ORDER — OXYTOCIN 10 UNIT/ML
333.33 VIAL (ML) INJECTION
Qty: 20 | Refills: 0 | Status: DISCONTINUED | OUTPATIENT
Start: 2023-05-01 | End: 2023-05-04

## 2023-05-01 RX ORDER — CEFAZOLIN SODIUM 1 G
3000 VIAL (EA) INJECTION EVERY 8 HOURS
Refills: 0 | Status: COMPLETED | OUTPATIENT
Start: 2023-05-01 | End: 2023-05-03

## 2023-05-01 RX ORDER — OXYCODONE HYDROCHLORIDE 5 MG/1
5 TABLET ORAL ONCE
Refills: 0 | Status: DISCONTINUED | OUTPATIENT
Start: 2023-05-01 | End: 2023-05-04

## 2023-05-01 RX ORDER — OXYCODONE HYDROCHLORIDE 5 MG/1
5 TABLET ORAL
Refills: 0 | Status: DISCONTINUED | OUTPATIENT
Start: 2023-05-01 | End: 2023-05-04

## 2023-05-01 RX ORDER — CITRIC ACID/SODIUM CITRATE 300-500 MG
30 SOLUTION, ORAL ORAL ONCE
Refills: 0 | Status: COMPLETED | OUTPATIENT
Start: 2023-05-01 | End: 2023-05-01

## 2023-05-01 RX ORDER — SODIUM CHLORIDE 9 MG/ML
1000 INJECTION, SOLUTION INTRAVENOUS ONCE
Refills: 0 | Status: DISCONTINUED | OUTPATIENT
Start: 2023-05-01 | End: 2023-05-01

## 2023-05-01 RX ORDER — FAMOTIDINE 10 MG/ML
20 INJECTION INTRAVENOUS ONCE
Refills: 0 | Status: DISCONTINUED | OUTPATIENT
Start: 2023-05-01 | End: 2023-05-01

## 2023-05-01 RX ORDER — IBUPROFEN 200 MG
600 TABLET ORAL EVERY 6 HOURS
Refills: 0 | Status: COMPLETED | OUTPATIENT
Start: 2023-05-01 | End: 2024-03-29

## 2023-05-01 RX ORDER — SODIUM CHLORIDE 9 MG/ML
1000 INJECTION, SOLUTION INTRAVENOUS ONCE
Refills: 0 | Status: COMPLETED | OUTPATIENT
Start: 2023-05-01 | End: 2023-05-01

## 2023-05-01 RX ORDER — SODIUM CHLORIDE 9 MG/ML
1000 INJECTION, SOLUTION INTRAVENOUS
Refills: 0 | Status: DISCONTINUED | OUTPATIENT
Start: 2023-05-01 | End: 2023-05-01

## 2023-05-01 RX ORDER — FAMOTIDINE 10 MG/ML
20 INJECTION INTRAVENOUS ONCE
Refills: 0 | Status: COMPLETED | OUTPATIENT
Start: 2023-05-01 | End: 2023-05-01

## 2023-05-01 RX ORDER — DIPHENHYDRAMINE HCL 50 MG
25 CAPSULE ORAL EVERY 6 HOURS
Refills: 0 | Status: COMPLETED | OUTPATIENT
Start: 2023-05-01 | End: 2024-03-29

## 2023-05-01 RX ORDER — TETANUS TOXOID, REDUCED DIPHTHERIA TOXOID AND ACELLULAR PERTUSSIS VACCINE, ADSORBED 5; 2.5; 8; 8; 2.5 [IU]/.5ML; [IU]/.5ML; UG/.5ML; UG/.5ML; UG/.5ML
0.5 SUSPENSION INTRAMUSCULAR ONCE
Refills: 0 | Status: DISCONTINUED | OUTPATIENT
Start: 2023-05-01 | End: 2023-05-04

## 2023-05-01 RX ORDER — HEPARIN SODIUM 5000 [USP'U]/ML
10000 INJECTION INTRAVENOUS; SUBCUTANEOUS EVERY 12 HOURS
Refills: 0 | Status: DISCONTINUED | OUTPATIENT
Start: 2023-05-01 | End: 2023-05-04

## 2023-05-01 RX ORDER — CITRIC ACID/SODIUM CITRATE 300-500 MG
30 SOLUTION, ORAL ORAL ONCE
Refills: 0 | Status: DISCONTINUED | OUTPATIENT
Start: 2023-05-01 | End: 2023-05-01

## 2023-05-01 RX ORDER — ACETAMINOPHEN 500 MG
975 TABLET ORAL
Refills: 0 | Status: DISCONTINUED | OUTPATIENT
Start: 2023-05-01 | End: 2023-05-04

## 2023-05-01 RX ORDER — SODIUM CHLORIDE 9 MG/ML
1000 INJECTION, SOLUTION INTRAVENOUS
Refills: 0 | Status: DISCONTINUED | OUTPATIENT
Start: 2023-05-01 | End: 2023-05-02

## 2023-05-01 RX ADMIN — Medication 1000 MILLIUNIT(S)/MIN: at 23:10

## 2023-05-01 RX ADMIN — FAMOTIDINE 20 MILLIGRAM(S): 10 INJECTION INTRAVENOUS at 18:30

## 2023-05-01 RX ADMIN — SODIUM CHLORIDE 125 MILLILITER(S): 9 INJECTION, SOLUTION INTRAVENOUS at 23:02

## 2023-05-01 RX ADMIN — Medication 30 MILLILITER(S): at 18:32

## 2023-05-01 RX ADMIN — Medication 200 MILLIGRAM(S): at 23:00

## 2023-05-01 RX ADMIN — SODIUM CHLORIDE 2000 MILLILITER(S): 9 INJECTION, SOLUTION INTRAVENOUS at 18:15

## 2023-05-01 NOTE — OB PROVIDER H&P - NSHPPHYSICALEXAM_GEN_ALL_CORE
Assessment reveals VSS, abdomen soft, NT, obese  BP range 132//64 pulse 77  Expiratory wheezing noted in left upper lobe, otherwise CTA  Normal S1, S2, RRR    HELLP labs sent.   Prolonged deceleration noted at 1254, otherwise moderate variability, +15x15 beat accels.   Will continue to monitor.  Vtx confirmed by sono    1410: Severe range BP noted: 167/74  For repeat .  For tubal ligation

## 2023-05-01 NOTE — OB RN PATIENT PROFILE - NS_OBGYNHISTORY_OBGYN_ALL_OB_FT
2003- FT  7-0  2006- FT  7-0  2011-FT  7-0  2013-C/S for NRFHT- 7-0  2019-[facility]  6-0    AP course complicated by:  Cholestasis  Sleep apnea  Fatty liver

## 2023-05-01 NOTE — OB PROVIDER H&P - NS_OBGYNHISTORY_OBGYN_ALL_OB_FT
2003- FT  7-0  2006- FT  7-0  2011-FT  7-0  2013-C/S for NRFHT- 7-0  2019-[facility]  6-0    AP course complicated by:  Cholestasis  Sleep apnea  Fatty liver

## 2023-05-01 NOTE — OB PROVIDER TRIAGE NOTE - NS_OBGYNHISTORY_OBGYN_ALL_OB_FT
2003- FT  7-0  2006- FT  7-0  2011-FT  7-0  2013-C/S for NRFHT- 7-0  2019-[facility]  6-0    AP course complicated by:  Cholestasis  Sleep apnea  Fatty liver

## 2023-05-01 NOTE — OB RN DELIVERY SUMMARY - NSCSDELIVASCHE_OBGYN_ALL_OB
Patient cough week and a half. See TE from 3/10/20. Given tessalon pearls, did not help much. Reports cough has not improved.     Reports cough is moderate, fever has since resolved since 3/10/20. Feeling fatigued.     Patient wondering if can get refill of robitussin AC? Or of something else would help. Agreeable to telephone visit if needed.     Per age and symptoms, positive COVID-19 screen.     Routed to PCP to review and advise.     Mariaa Love RN, BSN, PHN  [facility]: [place]             Unscheduled

## 2023-05-01 NOTE — OB PROVIDER DELIVERY SUMMARY - NSPROVIDERDELIVERYNOTE_OBGYN_ALL_OB_FT
rMTCS with Bilateral Salpingectomy   Hysterotomy closed in 2 layer using caprosyn  Surgicel powder, vistaseal and interceed placed over hysterotomy   Grossly normal uterus, tubes, and ovaries  Abdomen closed in standard fashion  Pt and infant to recovery in stable condition  651/1500/100 rMTCS with Bilateral Salpingectomy   Hysterotomy closed in 2 layer using caprosyn  Surgicel powder, vistaseal and interceed placed over hysterotomy   Grossly normal uterus, tubes, and ovaries  Abdomen closed in standard fashion  Pt and infant to recovery in stable condition  651/1500/100  Dictation #[id number] rMTCS with Bilateral Salpingectomy   Hysterotomy closed in 2 layer using caprosyn  Surgicel powder, vistaseal and interceed placed over hysterotomy   Grossly normal uterus, tubes, and ovaries  Abdomen closed in standard fashion  Pt and infant to recovery in stable condition  651/1500/100  Dictation #[id number]    Service attending    I was present and participated in MTCS liveborn infant without complications    Emma PENALOZA

## 2023-05-01 NOTE — CHART NOTE - NSCHARTNOTEFT_GEN_A_CORE
Patient seen and evaluated for complaints of wheezing. At the bedside, patient laying supine without acute complaints. Denies chest pain, palpitations, shortness of breath, lightheaededness, dizziness, nausea, or vomiting. Endorses good fetal movement. Patient reports a history of SARAH that was diagnosed on sleep study 2 months prior to pregnancy. She was prescribed CPAP, and initially used it, but has not used it since pregnancy.     Vital Signs Last 24 Hrs  T(C): 36.9 (01 May 2023 15:48), Max: 36.9 (01 May 2023 15:48)  T(F): 98.4 (01 May 2023 15:48), Max: 98.4 (01 May 2023 15:48)  HR: 71 (01 May 2023 17:10) (65 - 90)  BP: 147/73 (01 May 2023 16:57) (132/71 - 167/74)  BP(mean): --  RR: 17 (01 May 2023 15:48) (17 - 18)  SpO2: 98% (01 May 2023 17:10) (91% - 99%)    Parameters below as of 01 May 2023 15:48  Patient On (Oxygen Delivery Method): room air    Physical Exam  Constitutional: NAD  CV: RRR  Resp: CTABL, no expiratory wheezes, no rales or rhonchi  Abd: soft, nontender, gravid    Assessment: 36yo  @37w5d admitted for rCS 2/2 gHTN. Patient intermittently complaining of SOB. No evidence of respiratory distress, bronchospasm, or pulmonary edema on evaluation at the bedside. Patient clinically stable, O2 saturation wnl on room air.     Plan  - Continue to monitor closely  - O2 saturation wnl  - Plan for rCS once NPO    Jody Mullins PGY3

## 2023-05-01 NOTE — OB RN DELIVERY SUMMARY - NSSELHIDDEN_OBGYN_ALL_OB_FT
[NS_DeliveryAttending1_OBGYN_ALL_OB_FT:[id number]=],[NS_DeliveryRN_OBGYN_ALL_OB_FT:[id number]=],[NS_DeliveryAttending2_OBGYN_ALL_OB_FT:[id number]=]

## 2023-05-01 NOTE — OB NEONATOLOGY/PEDIATRICIAN DELIVERY SUMMARY - NSPEDSNEONOTESA_OBGYN_ALL_OB_FT
Peds called to OR for CS.  37.5 wk male born via CS to a 36 y/o  mother. Prenatal history of elevated blood pressure and shortness of breath. Maternal labs include Blood Type  O+, HIV - , RPR NR , Rubella I , Hep B - , GBS unknown. AROM at delivery with clear (ROM hours: 0). Baby emerged vigorous, crying, was warmed, dried suctioned and stimulated with APGARS of 9/9. Mom plans to initiate breastfeeding and formula feed, consents Hep B vaccine and declines circ.  Highest maternal temp: 98.4 EOS 0.05

## 2023-05-01 NOTE — OB RN TRIAGE NOTE - NS_OBGYNHISTORY_OBGYN_ALL_OB_FT
'03, ','11  Primary C/S '13- CAT 2 Tracing   Rpt C/S '  '03, '06,'11  Primary C/S '13- CAT 2 Tracing   Rpt C/S '19  topx2 (d&c x1)

## 2023-05-01 NOTE — OB RN TRIAGE NOTE - NSICDXPASTSURGICALHX_GEN_ALL_CORE_FT
PAST SURGICAL HISTORY:  History of  section     S/P laparoscopic cholecystectomy  or      PAST SURGICAL HISTORY:  H/O dilation and curettage     History of  section     History of dilation and curettage     S/P laparoscopic cholecystectomy  or

## 2023-05-01 NOTE — OB PROVIDER DELIVERY SUMMARY - NSSELHIDDEN_OBGYN_ALL_OB_FT
[NS_DeliveryAttending1_OBGYN_ALL_OB_FT:[id number]=],[NS_DeliveryRN_OBGYN_ALL_OB_FT:[id number]=],[NS_DeliveryAttending2_OBGYN_ALL_OB_FT:[id number]=]

## 2023-05-01 NOTE — OB RN DELIVERY SUMMARY - NS_PROPHYLACTICABXNAME_OBGYN_ALL_OB_FT
Impression: Tear film insufficiency of bilateral lacrimal glands Plan: Continue AT's qid OU. O3's. Avoid ceiling fans as previously directed. See anesthesia record

## 2023-05-01 NOTE — OB RN INTRAOPERATIVE NOTE - NSSELHIDDEN_OBGYN_ALL_OB_FT
[NS_DeliveryAttending1_OBGYN_ALL_OB_FT:[id number]=],[NS_DeliveryRN_OBGYN_ALL_OB_FT:[id number]] [NS_DeliveryAttending1_OBGYN_ALL_OB_FT:[id number]=],[NS_DeliveryRN_OBGYN_ALL_OB_FT:[id number]=],[NS_DeliveryAttending2_OBGYN_ALL_OB_FT:[id number]=]

## 2023-05-01 NOTE — OB PROVIDER H&P - ASSESSMENT
Admit for repeat    Routine pre-op orders  Cross match x 2 units  [facility] labs pending  D/W Dr. Red

## 2023-05-01 NOTE — OB PROVIDER TRIAGE NOTE - HISTORY OF PRESENT ILLNESS
38yo  @ 37.5 presents sent from clinic for evaluation for wheezing and elevated BP in clinic today. Reports she has felt wheezing since yesterday and is also having congestion from allergies. Reports SOB with activity. Denies chest pain or palpitations.   Reports elevated BP's in clinic today of 136/82. Denies HA, N/V, epigastric pain, visual changes. Denies LOF, VB and reports GFM.   AP course complicated by fatty liver with elevated liver enzymes, cholestasis on ursidiol 500mg BID.  Scheduled for repeat  /    H/O  Cholecystectomy  Sleep Apnea,   Cholestasis of pregnancy- ursidiol 500mg PO BID  Fatty Liver   C/S x 2  Morbid obesity BMI 64

## 2023-05-01 NOTE — OB RN PATIENT PROFILE - NS_DATEOFLASTVISIT_OBGYN_ALL_OB_DT
Pt called with c/o on and off right labial itching for about the last 5 weeks.  Denies unusual discharge, odor, or irritation. Pt states the itches occurs daily and has even woke her up out sleep.  She has been using Caladryl and ibuprofen with relief but the itching comes back.    Pt states she had the exact same thing a few years ago and that it had lasted for about 5 years, it finally resolved with Prednisone.  She has had many biopsies (all negative), saw a dermatologist, and tried every ointment/cream available with no relief.      Pt states she has discussed this with her oncologist, who feels it is likely related to the Tamoxifen, however she should f/u with gyne.    Pt states this is not urgent but she wanted to f/u.  Offered an appt 12/28/21, but the pt is unavailable.  Pt scheduled for a problem visit 1/12/21 with Dr. Briceno.  Pt aware to come alone (no partner/children) and to wear a mask.     
Pt was placed on TAMOXIFEN by her Oncologist.  Vaginal itching is cause by TAMOXIFEN or to figure out what's causing the itching, she stated she was treated for the itching by Dr Robledo before.  
01-May-2023

## 2023-05-01 NOTE — OB RN DELIVERY SUMMARY - NS_SEPSISRSKCALC_OBGYN_ALL_OB_FT
EOS calculated successfully. EOS Risk Factor: 0.5/1000 live births ([facility] national incidence); GA=37w5d; Temp=98.42; ROM=0.05; GBS='Unknown'; Antibiotics='No antibiotics or any antibiotics < 2 hrs prior to birth'

## 2023-05-01 NOTE — OB RN PATIENT PROFILE - NSICDXPASTMEDICALHX_GEN_ALL_CORE_FT
PAST MEDICAL HISTORY:  Cholestasis of pregnancy     Fatty liver     GERD (gastroesophageal reflux disease)     Liver and biliary tract disorders in pregnancy, third trimester     Morbid obesity     SRAAH (obstructive sleep apnea)

## 2023-05-01 NOTE — OB PROVIDER TRIAGE NOTE - NSHPPHYSICALEXAM_GEN_ALL_CORE
Assessment reveals VSS, abdomen soft, NT, obese  BP range 132//64 pulse 77  Expiratory wheezing noted in left upper lobe, otherwise CTA  Normal S1, S2, RRR    HELLP labs sent.   Prolonged deceleration noted at 1254, otherwise moderate variability, +15x15 beat accels.   Will continue to monitor. Assessment reveals VSS, abdomen soft, NT, obese  BP range 132//64 pulse 77  Expiratory wheezing noted in left upper lobe, otherwise CTA  Normal S1, S2, RRR    HELLP labs sent.   Prolonged deceleration noted at 1254, otherwise moderate variability, +15x15 beat accels.   Will continue to monitor.  Vtx confirmed by sono    1410: Severe range BP noted: 167/74  For repeat .

## 2023-05-02 ENCOUNTER — TRANSCRIPTION ENCOUNTER (OUTPATIENT)
Age: 38
End: 2023-05-02

## 2023-05-02 ENCOUNTER — NON-APPOINTMENT (OUTPATIENT)
Age: 38
End: 2023-05-02

## 2023-05-02 DIAGNOSIS — K76.0 FATTY (CHANGE OF) LIVER, NOT ELSEWHERE CLASSIFIED: ICD-10-CM

## 2023-05-02 DIAGNOSIS — O09.93 SUPERVISION OF HIGH RISK PREGNANCY, UNSPECIFIED, THIRD TRIMESTER: ICD-10-CM

## 2023-05-02 DIAGNOSIS — O09.522 SUPERVISION OF ELDERLY MULTIGRAVIDA, SECOND TRIMESTER: ICD-10-CM

## 2023-05-02 DIAGNOSIS — R06.2 WHEEZING: ICD-10-CM

## 2023-05-02 DIAGNOSIS — E66.01 MORBID (SEVERE) OBESITY DUE TO EXCESS CALORIES: ICD-10-CM

## 2023-05-02 DIAGNOSIS — K83.1 OBSTRUCTION OF BILE DUCT: ICD-10-CM

## 2023-05-02 DIAGNOSIS — R74.8 ABNORMAL LEVELS OF OTHER SERUM ENZYMES: ICD-10-CM

## 2023-05-02 LAB
ALBUMIN SERPL ELPH-MCNC: 2.6 G/DL — LOW (ref 3.3–5)
ALBUMIN SERPL ELPH-MCNC: 2.7 G/DL — LOW (ref 3.3–5)
ALP SERPL-CCNC: 106 U/L — SIGNIFICANT CHANGE UP (ref 40–120)
ALP SERPL-CCNC: 110 U/L — SIGNIFICANT CHANGE UP (ref 40–120)
ALT FLD-CCNC: 27 U/L — SIGNIFICANT CHANGE UP (ref 4–33)
ALT FLD-CCNC: 31 U/L — SIGNIFICANT CHANGE UP (ref 4–33)
ANION GAP SERPL CALC-SCNC: 12 MMOL/L — SIGNIFICANT CHANGE UP (ref 7–14)
ANION GAP SERPL CALC-SCNC: 12 MMOL/L — SIGNIFICANT CHANGE UP (ref 7–14)
APTT BLD: 27 SEC — SIGNIFICANT CHANGE UP (ref 27–36.3)
APTT BLD: 28.7 SEC — SIGNIFICANT CHANGE UP (ref 27–36.3)
AST SERPL-CCNC: 55 U/L — HIGH (ref 4–32)
AST SERPL-CCNC: 61 U/L — HIGH (ref 4–32)
BASOPHILS # BLD AUTO: 0.05 K/UL — SIGNIFICANT CHANGE UP (ref 0–0.2)
BASOPHILS # BLD AUTO: 0.05 K/UL — SIGNIFICANT CHANGE UP (ref 0–0.2)
BASOPHILS NFR BLD AUTO: 0.4 % — SIGNIFICANT CHANGE UP (ref 0–2)
BASOPHILS NFR BLD AUTO: 0.4 % — SIGNIFICANT CHANGE UP (ref 0–2)
BILIRUB SERPL-MCNC: 0.2 MG/DL — SIGNIFICANT CHANGE UP (ref 0.2–1.2)
BILIRUB SERPL-MCNC: <0.2 MG/DL — SIGNIFICANT CHANGE UP (ref 0.2–1.2)
BUN SERPL-MCNC: 16 MG/DL — SIGNIFICANT CHANGE UP (ref 7–23)
BUN SERPL-MCNC: 17 MG/DL — SIGNIFICANT CHANGE UP (ref 7–23)
C TRACH RRNA SPEC QL NAA+PROBE: SIGNIFICANT CHANGE UP
CALCIUM SERPL-MCNC: 8.3 MG/DL — LOW (ref 8.4–10.5)
CALCIUM SERPL-MCNC: 8.4 MG/DL — SIGNIFICANT CHANGE UP (ref 8.4–10.5)
CHLORIDE SERPL-SCNC: 101 MMOL/L — SIGNIFICANT CHANGE UP (ref 98–107)
CHLORIDE SERPL-SCNC: 106 MMOL/L — SIGNIFICANT CHANGE UP (ref 98–107)
CO2 SERPL-SCNC: 21 MMOL/L — LOW (ref 22–31)
CO2 SERPL-SCNC: 21 MMOL/L — LOW (ref 22–31)
CREAT SERPL-MCNC: 0.94 MG/DL — SIGNIFICANT CHANGE UP (ref 0.5–1.3)
CREAT SERPL-MCNC: 0.99 MG/DL — SIGNIFICANT CHANGE UP (ref 0.5–1.3)
EGFR: 75 ML/MIN/1.73M2 — SIGNIFICANT CHANGE UP
EGFR: 80 ML/MIN/1.73M2 — SIGNIFICANT CHANGE UP
EOSINOPHIL # BLD AUTO: 0 K/UL — SIGNIFICANT CHANGE UP (ref 0–0.5)
EOSINOPHIL # BLD AUTO: 0.01 K/UL — SIGNIFICANT CHANGE UP (ref 0–0.5)
EOSINOPHIL NFR BLD AUTO: 0 % — SIGNIFICANT CHANGE UP (ref 0–6)
EOSINOPHIL NFR BLD AUTO: 0.1 % — SIGNIFICANT CHANGE UP (ref 0–6)
GLUCOSE SERPL-MCNC: 79 MG/DL — SIGNIFICANT CHANGE UP (ref 70–99)
GLUCOSE SERPL-MCNC: 95 MG/DL — SIGNIFICANT CHANGE UP (ref 70–99)
GROUP B BETA STREP DNA (PCR): SIGNIFICANT CHANGE UP
HCT VFR BLD CALC: 31.2 % — LOW (ref 34.5–45)
HCT VFR BLD CALC: 32.3 % — LOW (ref 34.5–45)
HGB BLD-MCNC: 9.6 G/DL — LOW (ref 11.5–15.5)
HGB BLD-MCNC: 9.9 G/DL — LOW (ref 11.5–15.5)
IANC: 10.45 K/UL — HIGH (ref 1.8–7.4)
IANC: 9.23 K/UL — HIGH (ref 1.8–7.4)
IMM GRANULOCYTES NFR BLD AUTO: 0.4 % — SIGNIFICANT CHANGE UP (ref 0–0.9)
IMM GRANULOCYTES NFR BLD AUTO: 0.4 % — SIGNIFICANT CHANGE UP (ref 0–0.9)
INR BLD: 1.03 RATIO — SIGNIFICANT CHANGE UP (ref 0.88–1.16)
INR BLD: 1.08 RATIO — SIGNIFICANT CHANGE UP (ref 0.88–1.16)
LDH SERPL L TO P-CCNC: 228 U/L — HIGH (ref 135–225)
LDH SERPL L TO P-CCNC: 228 U/L — HIGH (ref 135–225)
LYMPHOCYTES # BLD AUTO: 15.8 % — SIGNIFICANT CHANGE UP (ref 13–44)
LYMPHOCYTES # BLD AUTO: 2.19 K/UL — SIGNIFICANT CHANGE UP (ref 1–3.3)
LYMPHOCYTES # BLD AUTO: 22.5 % — SIGNIFICANT CHANGE UP (ref 13–44)
LYMPHOCYTES # BLD AUTO: 3.07 K/UL — SIGNIFICANT CHANGE UP (ref 1–3.3)
MCHC RBC-ENTMCNC: 28.3 PG — SIGNIFICANT CHANGE UP (ref 27–34)
MCHC RBC-ENTMCNC: 28.8 PG — SIGNIFICANT CHANGE UP (ref 27–34)
MCHC RBC-ENTMCNC: 30.7 GM/DL — LOW (ref 32–36)
MCHC RBC-ENTMCNC: 30.8 GM/DL — LOW (ref 32–36)
MCV RBC AUTO: 92.3 FL — SIGNIFICANT CHANGE UP (ref 80–100)
MCV RBC AUTO: 93.7 FL — SIGNIFICANT CHANGE UP (ref 80–100)
MONOCYTES # BLD AUTO: 1.13 K/UL — HIGH (ref 0–0.9)
MONOCYTES # BLD AUTO: 1.25 K/UL — HIGH (ref 0–0.9)
MONOCYTES NFR BLD AUTO: 8.1 % — SIGNIFICANT CHANGE UP (ref 2–14)
MONOCYTES NFR BLD AUTO: 9.1 % — SIGNIFICANT CHANGE UP (ref 2–14)
N GONORRHOEA RRNA SPEC QL NAA+PROBE: SIGNIFICANT CHANGE UP
NEUTROPHILS # BLD AUTO: 10.45 K/UL — HIGH (ref 1.8–7.4)
NEUTROPHILS # BLD AUTO: 9.23 K/UL — HIGH (ref 1.8–7.4)
NEUTROPHILS NFR BLD AUTO: 67.5 % — SIGNIFICANT CHANGE UP (ref 43–77)
NEUTROPHILS NFR BLD AUTO: 75.3 % — SIGNIFICANT CHANGE UP (ref 43–77)
NRBC # BLD: 0 /100 WBCS — SIGNIFICANT CHANGE UP (ref 0–0)
NRBC # BLD: 0 /100 WBCS — SIGNIFICANT CHANGE UP (ref 0–0)
NRBC # FLD: 0 K/UL — SIGNIFICANT CHANGE UP (ref 0–0)
NRBC # FLD: 0 K/UL — SIGNIFICANT CHANGE UP (ref 0–0)
PLATELET # BLD AUTO: 272 K/UL — SIGNIFICANT CHANGE UP (ref 150–400)
PLATELET # BLD AUTO: 282 K/UL — SIGNIFICANT CHANGE UP (ref 150–400)
POTASSIUM SERPL-MCNC: 4.6 MMOL/L — SIGNIFICANT CHANGE UP (ref 3.5–5.3)
POTASSIUM SERPL-MCNC: 5.1 MMOL/L — SIGNIFICANT CHANGE UP (ref 3.5–5.3)
POTASSIUM SERPL-SCNC: 4.6 MMOL/L — SIGNIFICANT CHANGE UP (ref 3.5–5.3)
POTASSIUM SERPL-SCNC: 5.1 MMOL/L — SIGNIFICANT CHANGE UP (ref 3.5–5.3)
PROT SERPL-MCNC: 5.4 G/DL — LOW (ref 6–8.3)
PROT SERPL-MCNC: 5.5 G/DL — LOW (ref 6–8.3)
PROTHROM AB SERPL-ACNC: 12 SEC — SIGNIFICANT CHANGE UP (ref 10.5–13.4)
PROTHROM AB SERPL-ACNC: 12.5 SEC — SIGNIFICANT CHANGE UP (ref 10.5–13.4)
RBC # BLD: 3.33 M/UL — LOW (ref 3.8–5.2)
RBC # BLD: 3.5 M/UL — LOW (ref 3.8–5.2)
RBC # FLD: 15.8 % — HIGH (ref 10.3–14.5)
RBC # FLD: 15.9 % — HIGH (ref 10.3–14.5)
SODIUM SERPL-SCNC: 134 MMOL/L — LOW (ref 135–145)
SODIUM SERPL-SCNC: 139 MMOL/L — SIGNIFICANT CHANGE UP (ref 135–145)
SOURCE GROUP B STREP: SIGNIFICANT CHANGE UP
SPECIMEN SOURCE: SIGNIFICANT CHANGE UP
T PALLIDUM AB TITR SER: NEGATIVE — SIGNIFICANT CHANGE UP
URATE SERPL-MCNC: 6.8 MG/DL — SIGNIFICANT CHANGE UP (ref 2.5–7)
URATE SERPL-MCNC: 6.9 MG/DL — SIGNIFICANT CHANGE UP (ref 2.5–7)
WBC # BLD: 13.67 K/UL — HIGH (ref 3.8–10.5)
WBC # BLD: 13.88 K/UL — HIGH (ref 3.8–10.5)
WBC # FLD AUTO: 13.67 K/UL — HIGH (ref 3.8–10.5)
WBC # FLD AUTO: 13.88 K/UL — HIGH (ref 3.8–10.5)

## 2023-05-02 PROCEDURE — 99232 SBSQ HOSP IP/OBS MODERATE 35: CPT

## 2023-05-02 PROCEDURE — 71045 X-RAY EXAM CHEST 1 VIEW: CPT | Mod: 26

## 2023-05-02 PROCEDURE — 99223 1ST HOSP IP/OBS HIGH 75: CPT | Mod: GC

## 2023-05-02 PROCEDURE — 93306 TTE W/DOPPLER COMPLETE: CPT | Mod: 26

## 2023-05-02 RX ORDER — SODIUM CHLORIDE 9 MG/ML
1000 INJECTION, SOLUTION INTRAVENOUS ONCE
Refills: 0 | Status: COMPLETED | OUTPATIENT
Start: 2023-05-02 | End: 2023-05-02

## 2023-05-02 RX ORDER — ALBUTEROL 90 UG/1
1 AEROSOL, METERED ORAL EVERY 4 HOURS
Refills: 0 | Status: DISCONTINUED | OUTPATIENT
Start: 2023-05-02 | End: 2023-05-04

## 2023-05-02 RX ORDER — OXYCODONE HYDROCHLORIDE 5 MG/1
5 TABLET ORAL ONCE
Refills: 0 | Status: DISCONTINUED | OUTPATIENT
Start: 2023-05-02 | End: 2023-05-02

## 2023-05-02 RX ORDER — ONDANSETRON 8 MG/1
4 TABLET, FILM COATED ORAL ONCE
Refills: 0 | Status: COMPLETED | OUTPATIENT
Start: 2023-05-02 | End: 2023-05-02

## 2023-05-02 RX ORDER — ALBUTEROL 90 UG/1
2.5 AEROSOL, METERED ORAL EVERY 6 HOURS
Refills: 0 | Status: DISCONTINUED | OUTPATIENT
Start: 2023-05-02 | End: 2023-05-04

## 2023-05-02 RX ORDER — SODIUM CHLORIDE 9 MG/ML
500 INJECTION, SOLUTION INTRAVENOUS ONCE
Refills: 0 | Status: COMPLETED | OUTPATIENT
Start: 2023-05-02 | End: 2023-05-02

## 2023-05-02 RX ORDER — DIPHENHYDRAMINE HCL 50 MG
25 CAPSULE ORAL EVERY 6 HOURS
Refills: 0 | Status: DISCONTINUED | OUTPATIENT
Start: 2023-05-02 | End: 2023-05-04

## 2023-05-02 RX ORDER — IBUPROFEN 200 MG
600 TABLET ORAL EVERY 6 HOURS
Refills: 0 | Status: DISCONTINUED | OUTPATIENT
Start: 2023-05-02 | End: 2023-05-04

## 2023-05-02 RX ADMIN — ONDANSETRON 4 MILLIGRAM(S): 8 TABLET, FILM COATED ORAL at 05:47

## 2023-05-02 RX ADMIN — Medication 200 MILLIGRAM(S): at 18:08

## 2023-05-02 RX ADMIN — Medication 30 MILLIGRAM(S): at 11:54

## 2023-05-02 RX ADMIN — OXYCODONE HYDROCHLORIDE 5 MILLIGRAM(S): 5 TABLET ORAL at 09:45

## 2023-05-02 RX ADMIN — Medication 25 MILLIGRAM(S): at 05:18

## 2023-05-02 RX ADMIN — SODIUM CHLORIDE 1000 MILLILITER(S): 9 INJECTION, SOLUTION INTRAVENOUS at 11:00

## 2023-05-02 RX ADMIN — Medication 30 MILLIGRAM(S): at 05:56

## 2023-05-02 RX ADMIN — Medication 200 MILLIGRAM(S): at 09:45

## 2023-05-02 RX ADMIN — Medication 30 MILLIGRAM(S): at 18:13

## 2023-05-02 RX ADMIN — Medication 975 MILLIGRAM(S): at 15:25

## 2023-05-02 RX ADMIN — OXYCODONE HYDROCHLORIDE 5 MILLIGRAM(S): 5 TABLET ORAL at 10:45

## 2023-05-02 RX ADMIN — Medication 30 MILLIGRAM(S): at 11:39

## 2023-05-02 RX ADMIN — HEPARIN SODIUM 10000 UNIT(S): 5000 INJECTION INTRAVENOUS; SUBCUTANEOUS at 01:55

## 2023-05-02 RX ADMIN — Medication 975 MILLIGRAM(S): at 00:15

## 2023-05-02 RX ADMIN — Medication 975 MILLIGRAM(S): at 09:21

## 2023-05-02 RX ADMIN — SODIUM CHLORIDE 1000 MILLILITER(S): 9 INJECTION, SOLUTION INTRAVENOUS at 05:47

## 2023-05-02 RX ADMIN — Medication 975 MILLIGRAM(S): at 14:28

## 2023-05-02 RX ADMIN — HEPARIN SODIUM 10000 UNIT(S): 5000 INJECTION INTRAVENOUS; SUBCUTANEOUS at 18:07

## 2023-05-02 RX ADMIN — Medication 975 MILLIGRAM(S): at 10:20

## 2023-05-02 RX ADMIN — Medication 975 MILLIGRAM(S): at 00:49

## 2023-05-02 RX ADMIN — Medication 30 MILLIGRAM(S): at 18:28

## 2023-05-02 RX ADMIN — Medication 30 MILLIGRAM(S): at 05:19

## 2023-05-02 NOTE — DISCHARGE NOTE OB - HOSPITAL COURSE
Hospital Course:  Patient underwent an uncomplicated rLTCS+BS for [indication] (please see operative note for details of procedure.)     EBL:   Hct:    Patient's post operative course was unremarkable and she remained hemodynamically stable and afebrile throughout. Upon discharge on POD3, the patient is ambulating and voiding spontaneously, tolerated oral intake, pain was well controlled with oral medications and vital signs were stable.   Hospital Course:  Patient underwent an uncomplicated rLTCS+BS for preeclampsia. Pt did not meet criteria for sPEC while inpatient. HELLP labs notable for transaminitis as described below. Pt received Ancef for 48h post-op for prophylaxis. HSQ 10k BID was given for DVT prophylaxis.    EBL: 651  Hct: 35.2->36.5->32.3->>31.5  AST/ALT were trended given pt's hx of cholestasis/known fatty liver disease and remained stable.  Pt was initially oliguric after delivery but improved, Cr 0.51->>0.99->>0.79  Pt was noted to have intermittent audible expiratory wheeze, lung sounds were clear and SpO2 remained >94% on RA. Pt was evaluated by pulmonology on POD#1 for intermittent wheezing, recommended Albuterol PRN and CPAP machine at night for history of SARAH.  Pt was evaluated by Cardio-OB who recommended a TTE and outpatient follow up.  TTE (5/2) demonstrated mild-mod MR, mild pulmonary HTN, EF 56%. Overall stable from prior echo in March.    Patient's post operative course was unremarkable and she remained hemodynamically stable and afebrile throughout. Upon discharge on POD3, the patient is ambulating and voiding spontaneously, tolerated oral intake, pain was well controlled with oral medications and vital signs were stable.

## 2023-05-02 NOTE — CHART NOTE - NSCHARTNOTEFT_GEN_A_CORE
R1 Pulm Records Note    Obtained pulmonology records from 1y prior from Dr. Walker at [facility]. Pt was noted to have rhinitis and SARAH, CPAP and Flonase were recommended at that time. Per pt, CPAP machine does not fit well on her face and makes it difficult for her to sleep so she has not been using it.    -Will place pt on  overnight given h/o SARAH  -TTE ordered per pulm recs, f/u read  -Cardio-OB consult placed to r/o postpartum cardiomyopathy    Kimberlee Alonzo PGY1 R1 Pulm Records Note    Obtained pulmonology records from 1y prior from Dr. Walker at [facility]. Pt was noted to have rhinitis and SARAH, CPAP and Flonase were recommended at that time. Per pt, CPAP machine does not fit well on her face and makes it difficult for her to sleep so she has not been using it.    -Will place pt on  overnight given h/o SARAH  -TTE ordered per pulm recs, f/u read  -Cardio-OB consult placed to r/o peripartum cardiomyopathy    Kimberlee Alonzo PGY1

## 2023-05-02 NOTE — CONSULT NOTE ADULT - ASSESSMENT
37F  with PMHx fatty Liver with cholestasis of pregnancy, Obesity (BMI 64), Sleep apnea not using CPAP presented to the hospital from OB clinic for evaluation for wheezing and elevated BP. Reported wheezing for 2 days and shortness of breath with walking and lying flat.    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 Reported wheezing for 2 days and shortness of breath with walking and lying flat.    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

## 2023-05-02 NOTE — CONSULT NOTE ADULT - SUBJECTIVE AND OBJECTIVE BOX
36 yo F with morbid obesity, SARAH, cholestasis of pregnancy presenting with elevated BPs yesterday, s/p . Noted to have mild wheezing by Primary team. Patient states that pregnancy was uneventful, she had shortness of breath towards the last few weeks but otherwise gained forty pounds and did not have swelling. She states her BPs were not elevated.    CCU Progress Note    S/24 Events: No acute events overnight.     O:  T(C): 36.9 (23 @ 18:56), Max: 37.1 (23 @ 09:43)  HR: 91 (23 @ 18:56) (69 - 91)  BP: 122/71 (23 @ 18:56) (122/71 - 155/85)  RR: 18 (23 @ 18:56) (16 - 25)  SpO2: 99% (23 @ 18:56) (94% - 99%)  Wt(kg): --  Daily     Daily Baby A: Weight (gm) Delivery: 3030 (01 May 2023 19:35)    Tele:    O/E:  Gen: NAD, obese  HEENT: EOMI  CV: RRR, normal S1 + S2, no m/r/g  Lungs: CTAB  Abd: soft, non-tender  Ext: No edema    Labs:                        9.6    13.67 )-----------( 272      ( 02 May 2023 11:53 )             31.2     05-    134<L>  |  101  |  17  ----------------------------<  79  4.6   |  21<L>  |  0.94    Ca    8.3<L>      02 May 2023 11:53    TPro  5.4<L>  /  Alb  2.6<L>  /  TBili  <0.2  /  DBili  x   /  AST  55<H>  /  ALT  27  /  AlkPhos  106  05-02    PT/INR - ( 02 May 2023 11:53 )   PT: 12.0 sec;   INR: 1.03 ratio         PTT - ( 02 May 2023 11:53 )  PTT:27.0 sec          Meds:  MEDICATIONS  (STANDING):  acetaminophen     Tablet .. 975 milliGRAM(s) Oral <User Schedule>  ceFAZolin   IVPB 3000 milliGRAM(s) IV Intermittent every 8 hours  diphtheria/tetanus/pertussis (acellular) Vaccine (Adacel) 0.5 milliLiter(s) IntraMuscular once  heparin   Injectable 03496 Unit(s) SubCutaneous every 12 hours  ibuprofen  Tablet. 600 milliGRAM(s) Oral every 6 hours  oxytocin Infusion 333.333 milliUNIT(s)/Min (1000 mL/Hr) IV Continuous <Continuous>  oxytocin Infusion 333.333 milliUNIT(s)/Min (1000 mL/Hr) IV Continuous <Continuous>

## 2023-05-02 NOTE — CONSULT NOTE ADULT - SUBJECTIVE AND OBJECTIVE BOX
==============UNFINISHED NOTE==================      HPI:  37F  with PMHx fatty Liver with cholestasis of pregnancy, Obesity (BMI 64), Sleep apnea not using CPAP presented to the hospital from OB clinic for evaluation for wheezing and elevated BP. Reported wheezing for 2 days and shortness of breath with walking and lying flat. She was pregnant at 37.5 weeks planned for repeat  on  but due to symptoms was scheduled for  completed without complications.  She has allergies to pollen that worsen in spring but otherwise no history of lung or heart conditions. She reports she has never used inhalers, no family history of lung diseases. 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 Does not smoke. Denies fever/chills, cough, chest pain. She had a TTE in March with grade I diastolic dysfunction and borderline pulm HTN (PASP 37) otherwise normal. She does not have a pulmonologist, saw one at [facility] last year after her sleep study, but has not used her CPAP in almost a year because the mask is very uncomfortable for her.        REVIEW OF SYSTEMS:  CONSTITUTIONAL - No fever, No weight change, No lightheadedness  SKIN - No rash  HEMATOLOGIC - No abnormal bleeding or bruising  EYES - No eye pain, No blurred vision  ENT - No change in hearing, No sore throat, No neck pain, No rhinorrhea, No ear pain  RESPIRATORY -  +shortness of breath, No cough, +wheezing  CARDIAC -No chest pain, No palpitations  GI - No abdominal pain, No nausea, No vomiting, No diarrhea, No constipation  - No dysuria, no frequency, no hematuria.   MUSCULOSKELETAL - No joint pain, No swelling, No back pain  NEUROLOGIC - No numbness, No focal weakness, No headache, No dizziness      PHYSICAL EXAM:  GENERAL: Sitting comfortably in bed in no acute distress  NEURO: Alert and Oriented to person, place, date and situation. Pupils symmetric, No ptosis. No facial asymmetry or dysarthria, no tremor noted.  HEENT: No conjunctival injection or scleral icterus.   CARD: Normal rate and regular rhythm, no murmurs and no gallops appreciated.  RESP: Faint wheezing in posterior lower lobes, end expiratory. No rales, rhonchi, stridor, or decreased lung sounds.  ABD: Distended obese abdomen, Soft and nontender to palpation in all quadrants, no guarding, no rigidity.   EXT: No pitting pedal edema.       PAST MEDICAL & SURGICAL HISTORY:  Morbid obesity      Liver and biliary tract disorders in pregnancy, third trimester      Fatty liver      SARAH (obstructive sleep apnea)      Cholestasis of pregnancy      GERD (gastroesophageal reflux disease)      S/P laparoscopic cholecystectomy   or       History of  section      History of dilation and curettage      H/O dilation and curettage          Allergies    No Known Allergies    Intolerances        OBJECTIVE:  ICU Vital Signs Last 24 Hrs  T(C): 36.9 (02 May 2023 13:23), Max: 37.1 (02 May 2023 09:43)  T(F): 98.4 (02 May 2023 13:23), Max: 98.8 (02 May 2023 09:43)  HR: 84 (02 May 2023 13:23) (65 - 90)  BP: 123/72 (02 May 2023 13:23) (123/72 - 167/74)  BP(mean): 109 (02 May 2023 01:35) (98 - 109)  ABP: --  ABP(mean): --  RR: 18 (02 May 2023 13:23) (16 - 25)  SpO2: 99% (02 May 2023 13:23) (91% - 99%)    O2 Parameters below as of 02 May 2023 13:23  Patient On (Oxygen Delivery Method): room air               @ : @ 07:00  --------------------------------------------------------  IN: 4925 mL / OUT: 911 mL / NET: 4014 mL     @ 07:  -  0502 @ 13:47  --------------------------------------------------------  IN: 500 mL / OUT: 490 mL / NET: 10 mL      CAPILLARY BLOOD GLUCOSE          HOSPITAL MEDICATIONS:  MEDICATIONS  (STANDING):  acetaminophen     Tablet .. 975 milliGRAM(s) Oral <User Schedule>  ceFAZolin   IVPB 3000 milliGRAM(s) IV Intermittent every 8 hours  diphtheria/tetanus/pertussis (acellular) Vaccine (Adacel) 0.5 milliLiter(s) IntraMuscular once  heparin   Injectable 38578 Unit(s) SubCutaneous every 12 hours  ibuprofen  Tablet. 600 milliGRAM(s) Oral every 6 hours  ketorolac   Injectable 30 milliGRAM(s) IV Push every 6 hours  oxytocin Infusion 333.333 milliUNIT(s)/Min (1000 mL/Hr) IV Continuous <Continuous>  oxytocin Infusion 333.333 milliUNIT(s)/Min (1000 mL/Hr) IV Continuous <Continuous>    MEDICATIONS  (PRN):  diphenhydrAMINE 25 milliGRAM(s) Oral every 6 hours PRN Pruritus  lanolin Ointment 1 Application(s) Topical every 6 hours PRN Sore Nipples  magnesium hydroxide Suspension 30 milliLiter(s) Oral two times a day PRN Constipation  oxyCODONE    IR 5 milliGRAM(s) Oral every 3 hours PRN Moderate to Severe Pain (4-10)  oxyCODONE    IR 5 milliGRAM(s) Oral once PRN Moderate to Severe Pain (4-10)  simethicone 80 milliGRAM(s) Chew every 4 hours PRN Gas      LABS:                        9.6    13.67 )-----------( 272      ( 02 May 2023 11:53 )             31.2     05-02    134<L>  |  101  |  17  ----------------------------<  79  4.6   |  21<L>  |  0.94    Ca    8.3<L>      02 May 2023 11:53    TPro  5.4<L>  /  Alb  2.6<L>  /  TBili  <0.2  /  DBili  x   /  AST  55<H>  /  ALT  27  /  AlkPhos  106  05-02    PT/INR - ( 02 May 2023 11:53 )   PT: 12.0 sec;   INR: 1.03 ratio         PTT - ( 02 May 2023 11:53 )  PTT:27.0 sec  Urinalysis Basic - ( 01 May 2023 13:35 )    Color: Yellow / Appearance: Slightly Turbid / S.029 / pH: x  Gluc: x / Ketone: Negative  / Bili: Negative / Urobili: <2 mg/dL   Blood: x / Protein: >600 mg/dL / Nitrite: Negative   Leuk Esterase: Moderate / RBC: 5 /HPF / WBC 35 /HPF   Sq Epi: x / Non Sq Epi: x / Bacteria: Moderate                 HPI:  37F  with PMHx fatty Liver with cholestasis of pregnancy, Obesity (BMI 64), Sleep apnea not using CPAP presented to the hospital from OB clinic for evaluation for wheezing and elevated BP. Reported wheezing for 2 days and shortness of breath with walking and lying flat. She was pregnant at 37.5 weeks planned for repeat  on  but due to symptoms was scheduled for  completed without complications.  She has allergies to pollen that worsen in spring but otherwise no history of lung or heart conditions. She reports she has never used inhalers, no family history of lung diseases. 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 Does not smoke. Denies fever/chills, cough, chest pain. She had a TTE in March with grade I diastolic dysfunction and borderline pulm HTN (PASP 37) otherwise normal. She does not have a pulmonologist, saw one at [facility] last year after her sleep study, but has not used her CPAP in almost a year because the mask is very uncomfortable for her.        REVIEW OF SYSTEMS:  CONSTITUTIONAL - No fever, No weight change, No lightheadedness  SKIN - No rash  HEMATOLOGIC - No abnormal bleeding or bruising  EYES - No eye pain, No blurred vision  ENT - No change in hearing, No sore throat, No neck pain, No rhinorrhea, No ear pain  RESPIRATORY -  +shortness of breath, No cough, +wheezing  CARDIAC -No chest pain, No palpitations  GI - No abdominal pain, No nausea, No vomiting, No diarrhea, No constipation  - No dysuria, no frequency, no hematuria.   MUSCULOSKELETAL - No joint pain, No swelling, No back pain  NEUROLOGIC - No numbness, No focal weakness, No headache, No dizziness      PHYSICAL EXAM:  GENERAL: Sitting comfortably in bed in no acute distress  NEURO: Alert and Oriented to person, place, date and situation. Pupils symmetric, No ptosis. No facial asymmetry or dysarthria, no tremor noted.  HEENT: No conjunctival injection or scleral icterus.   CARD: Normal rate and regular rhythm, no murmurs and no gallops appreciated.  RESP: Faint wheezing in posterior lower lobes, end expiratory. No rales, rhonchi, stridor, or decreased lung sounds.  ABD: Distended obese abdomen, Soft and nontender to palpation in all quadrants, no guarding, no rigidity.   EXT: No pitting pedal edema.       PAST MEDICAL & SURGICAL HISTORY:  Morbid obesity      Liver and biliary tract disorders in pregnancy, third trimester      Fatty liver      SARAH (obstructive sleep apnea)      Cholestasis of pregnancy      GERD (gastroesophageal reflux disease)      S/P laparoscopic cholecystectomy   or       History of  section      History of dilation and curettage      H/O dilation and curettage          Allergies    No Known Allergies    Intolerances        OBJECTIVE:  ICU Vital Signs Last 24 Hrs  T(C): 36.9 (02 May 2023 13:23), Max: 37.1 (02 May 2023 09:43)  T(F): 98.4 (02 May 2023 13:23), Max: 98.8 (02 May 2023 09:43)  HR: 84 (02 May 2023 13:23) (65 - 90)  BP: 123/72 (02 May 2023 13:23) (123/72 - 167/74)  BP(mean): 109 (02 May 2023 01:35) (98 - 109)  ABP: --  ABP(mean): --  RR: 18 (02 May 2023 13:23) (16 - 25)  SpO2: 99% (02 May 2023 13:23) (91% - 99%)    O2 Parameters below as of 02 May 2023 13:23  Patient On (Oxygen Delivery Method): room air               @ 07:  -   @ 07:00  --------------------------------------------------------  IN: 4925 mL / OUT: 911 mL / NET: 4014 mL     @ 07:01  -  02 @ 13:47  --------------------------------------------------------  IN: 500 mL / OUT: 490 mL / NET: 10 mL      CAPILLARY BLOOD GLUCOSE          HOSPITAL MEDICATIONS:  MEDICATIONS  (STANDING):  acetaminophen     Tablet .. 975 milliGRAM(s) Oral <User Schedule>  ceFAZolin   IVPB 3000 milliGRAM(s) IV Intermittent every 8 hours  diphtheria/tetanus/pertussis (acellular) Vaccine (Adacel) 0.5 milliLiter(s) IntraMuscular once  heparin   Injectable 08866 Unit(s) SubCutaneous every 12 hours  ibuprofen  Tablet. 600 milliGRAM(s) Oral every 6 hours  ketorolac   Injectable 30 milliGRAM(s) IV Push every 6 hours  oxytocin Infusion 333.333 milliUNIT(s)/Min (1000 mL/Hr) IV Continuous <Continuous>  oxytocin Infusion 333.333 milliUNIT(s)/Min (1000 mL/Hr) IV Continuous <Continuous>    MEDICATIONS  (PRN):  diphenhydrAMINE 25 milliGRAM(s) Oral every 6 hours PRN Pruritus  lanolin Ointment 1 Application(s) Topical every 6 hours PRN Sore Nipples  magnesium hydroxide Suspension 30 milliLiter(s) Oral two times a day PRN Constipation  oxyCODONE    IR 5 milliGRAM(s) Oral every 3 hours PRN Moderate to Severe Pain (4-10)  oxyCODONE    IR 5 milliGRAM(s) Oral once PRN Moderate to Severe Pain (4-10)  simethicone 80 milliGRAM(s) Chew every 4 hours PRN Gas      LABS:                        9.6    13.67 )-----------( 272      ( 02 May 2023 11:53 )             31.2     05-02    134<L>  |  101  |  17  ----------------------------<  79  4.6   |  21<L>  |  0.94    Ca    8.3<L>      02 May 2023 11:53    TPro  5.4<L>  /  Alb  2.6<L>  /  TBili  <0.2  /  DBili  x   /  AST  55<H>  /  ALT  27  /  AlkPhos  106  05-02    PT/INR - ( 02 May 2023 11:53 )   PT: 12.0 sec;   INR: 1.03 ratio         PTT - ( 02 May 2023 11:53 )  PTT:27.0 sec  Urinalysis Basic - ( 01 May 2023 13:35 )    Color: Yellow / Appearance: Slightly Turbid / S.029 / pH: x  Gluc: x / Ketone: Negative  / Bili: Negative / Urobili: <2 mg/dL   Blood: x / Protein: >600 mg/dL / Nitrite: Negative   Leuk Esterase: Moderate / RBC: 5 /HPF / WBC 35 /HPF   Sq Epi: x / Non Sq Epi: x / Bacteria: Moderate

## 2023-05-02 NOTE — DISCHARGE NOTE OB - PLAN OF CARE
You underwent an echocardiogram which demonstrated mild pulmonary hypertension which was stable from your previous echo. Continue to follow up with Cardio-OB outpatient. Your liver function tests were monitored throughout your stay and were somewhat elevated but stable for your baseline. You underwent a bilateral salpingectomy for permanent contraception. Instructions:  You underwent a repeat mid transverse  delivery.  Make your follow-up appointment with your doctor as ordered.   No heavy lifting, driving, or strenuous activity for 6 weeks.   Nothing per vagina such as tampons, intercourse, douches or tub baths for 6 weeks or until you see your doctor.   Call your doctor with any signs and symptoms of infection such as fever, chills, nausea or vomiting. Call your doctor with redness or swelling at the incision site, fluid leakage or wound separation. Call your doctor if you're unable to tolerate food, increase in vaginal bleeding, or have difficulty urinating. Call your doctor if you have pain that is not relieved by your prescribed medications. Notify your doctor with any of concerns.    Follow up:  Please f/u in 5-7 days in clinic for removal of staples.  Please f/u in 2 weeks for an incision check and for a postpartum appointment in 4-6 weeks @Ambulatory Clinic Unit, [facility], Oncology Building, basement floor. Please call the office for an appointment ([phone]). Given a prescription for a blood pressure cuff to monitor your blood pressure at home. Call your doctor if your blood pressure is greater than or equal to 160 systolic (top number) or 110 diastolic (bottom number), or you experience a headache unrelieved by OTC medications, blurred vision, or difficulty breathing. You were seen by pulmonology here for wheezing. You were given albuterol nebulizers with improvement in wheezing. Your oxygen saturation remained normal on room air. Please follow up with pulmonology on 5/5 outpatient. Prescription sent for Albuterol inhaler for wheezing as needed. Instructions:  Please follow up on  for BP check and removal of staples, please call clinic for an appointment.  You underwent a repeat mid transverse  delivery.  Make your follow-up appointment with your doctor as ordered.   No heavy lifting, driving, or strenuous activity for 6 weeks.   Make sure to keep the incision as dry as possible to prevent infection, you can use a blow dryer over the incision to keep the area dry.  Nothing per vagina such as tampons, intercourse, douches or tub baths for 6 weeks or until you see your doctor.   Call your doctor with any signs and symptoms of infection such as fever, chills, nausea or vomiting. Call your doctor with redness or swelling at the incision site, fluid leakage or wound separation. Call your doctor if you're unable to tolerate food, increase in vaginal bleeding, or have difficulty urinating. Call your doctor if you have pain that is not relieved by your prescribed medications. Notify your doctor with any of concerns.    Follow up:  Please f/u in 2-4 days in clinic for removal of staples and BP check.  Please f/u in 2 weeks for an incision check and for a postpartum appointment in 4-6 weeks @Ambulatory Clinic Unit, [facility], Oncology Building, basement floor. Please call the office for an appointment ([phone]).

## 2023-05-02 NOTE — DISCHARGE NOTE OB - MATERIALS PROVIDED
Vaccinations/[facility] Walker Screening Program/Walker  Immunization Record/Breastfeeding Log/Bottle Feeding Log/Breastfeeding Mother’s Support Group Information/Guide to Postpartum Care/[facility] Hearing Screen Program/Back To Sleep Handout/Shaken Baby Prevention Handout/Breastfeeding Guide and Packet/Birth Certificate Instructions/Discharge Medication Information for Patients and Families Pocket Guide/MMR Vaccination (VIS Pub Date: 2012)/Tdap Vaccination (VIS Pub Date: 2012)

## 2023-05-02 NOTE — DISCHARGE NOTE OB - CARE PROVIDER_API CALL
[facility]  Oncology [facility], [facility]  [address] [place] [id number]  [phone]  Phone: [phone]  Fax: (   )    -  Follow Up Time: 1-3 days

## 2023-05-02 NOTE — DISCHARGE NOTE OB - MEDICATION SUMMARY - MEDICATIONS TO TAKE
I will START or STAY ON the medications listed below when I get home from the hospital:    blood pressure cuff  -- Take BP 3 times a day at home. If BP is >160 systolic (top number) or >110 diastolic (bottom number) go to the ED and notify your OB. If your BPs are consistently >140/90, call your OB.  -- Indication: For Preeclampsia    vitamin d daily PO  -- Indication: For Vitamin D    ibuprofen 600 mg oral tablet  -- 1 tab(s) by mouth every 6 hours as needed for Pain Alternate every 3 hours with Tylenol  -- Indication: For Pain    acetaminophen 325 mg oral tablet  -- 3 tab(s) by mouth every 6 hours as needed for Pain Alternate every 3 hours with Motrin  -- Indication: For Pain    Albuterol (Eqv-ProAir HFA) 90 mcg/inh inhalation aerosol  -- 2 puff(s) inhaled every 6 hours as needed for  shortness of breath and/or wheezing  -- Indication: For Wheezing    famotidine 20 mg oral tablet  -- 1 by mouth 2 times a day as needed for  indigestion  -- Indication: For Reflux    Prenatal Multivitamins oral tablet  -- 1 tab(s) by mouth once a day  -- Indication: For Vitamin

## 2023-05-02 NOTE — DISCHARGE NOTE OB - SECONDARY DIAGNOSIS.
Intrahepatic cholestasis of pregnancy Wheezing Status post bilateral salpingectomy Preeclampsia Mild pulmonary hypertension

## 2023-05-02 NOTE — DISCHARGE NOTE OB - PROVIDER TOKENS
FREE:[LAST:[NORRIS],PHONE:[phone]],FAX:[(   )    -],ADDRESS:[[facility]  Oncology [facility], [place] [address]  [phone]],FOLLOWUP:[1-3 days]]

## 2023-05-02 NOTE — CONSULT NOTE ADULT - ASSESSMENT
Lying comfortably, no signs of orthopnea, no edema on exam. TTE with normal biventricular function, normal diastolic function. To be placed on CPAP by Pulmonary recommendations. No further wheezing. BPS have been overall in good range without medications. At this point, no further workup recommended.

## 2023-05-02 NOTE — DISCHARGE NOTE OB - MEDICATION SUMMARY - MEDICATIONS TO STOP TAKING
I will STOP taking the medications listed below when I get home from the hospital:    aspirin 81 mg oral capsule  -- 2 tab(s) by mouth once a day    ursodiol 500 mg oral tablet  -- 1 tablet by mouth 2 times a day

## 2023-05-02 NOTE — PROGRESS NOTE ADULT - ASSESSMENT
38y/[facility]  [facility]. PMH significant for fatty liver disease, SARAH, and BMI 64. H/H 11.7/36.5. Pt with mildly elevated BPs overnight but not meeting criteria for sPEC at this time. Overall pt recovering well post-operatively.    #PEC  - Overnight BPs 130-150/70-90s  - Pt declining sx of sPEC this AM  - HELLP labs WNL on admission, P/C 3.6  - F/u AM HELLP labs  - Continue to monitor BPs and for si/sx of sPEC    #Post-operative state  - Continue with po analgesia  - Increase ambulation  - Continue regular diet  - Check CBC  - Incision dressing removed  - HSQ 10k BID for DVT ppx  - Ancef x48h for incision site infection prophylaxis  - PP contraception: BS performed    Kimberlee Alonzo, PGY1 36y/o  POD#1 from [facility], , c/b PEC. PMH significant for fatty liver disease, SARAH, and BMI 64. H/H 11.7/36.5. Pt with mildly elevated BPs overnight but not meeting criteria for sPEC at this time. Pt with audible expiratory wheeze noted on exam. Overall pt recovering well post-operatively.    #PEC  - Overnight BPs 130-150/70-90s  - Pt declining sx of sPEC this AM  - HELLP labs WNL on admission, P/C 3.6  - F/u AM HELLP labs  - Continue to monitor BPs and for si/sx of sPEC    #Wheezing  - Pt with hx of SARAH, not adherent with CPAP due to difficulty fitting machine to face  - Pt reports following with pulmonologist 1y ago during work-up for potential gastric sleeve procedure however unsure of name or any diagnosis  - Pt reporting new-onset wheezing since   - SpO2 95-98% on RA  - Incentive spirometry encouraged and witnessed at bedside  - Will consult pulmonology for further recs    #Post-operative state  - Continue with po analgesia  - Increase ambulation  - Continue regular diet  - Check CBC  - Incision dressing removed  - HSQ 10k BID for DVT ppx  - Ancef x48h for incision site infection prophylaxis  - PP contraception: BS performed    Kimberlee Alonzo, PGY1

## 2023-05-02 NOTE — PROGRESS NOTE ADULT - SUBJECTIVE AND OBJECTIVE BOX
R1 Progress Note    Patient seen and examined at bedside, no acute overnight events. No acute complaints, pain well controlled. Patient is ambulating and tolerating regular diet. Has not yet passed flatus. Desai is still in place. Denies CP, SOB, N/V, HA, blurred vision, epigastric pain.    Vital Signs Last 24 Hours  T(C): 36.7 (05-02-23 @ 05:01), Max: 36.9 (05-01-23 @ 15:45)  HR: 81 (05-02-23 @ 05:01) (65 - 90)  BP: 139/76 (05-02-23 @ 05:01) (132/71 - 167/74)  RR: 20 (05-02-23 @ 05:01) (16 - 25)  SpO2: 95% (05-02-23 @ 05:01) (91% - 99%)    I&O's Summary    01 May 2023 07:01  -  02 May 2023 07:00  --------------------------------------------------------  IN: 4925 mL / OUT: 911 mL / NET: 4014 mL      Physical Exam:  General: NAD  Abdomen: Soft, non-tender, non-distended, fundus firm  Incision: Pfannenstiel incision CDI, staples in place  Pelvic: Lochia wnl    Labs:    Blood Type: O Positive  Antibody Screen: Negative  RPR: Negative               11.7   13.42 )-----------( 268      ( 05-01 @ 22:50 )             36.5                11.4   7.62  )-----------( 276      ( 05-01 @ 12:55 )             35.2                11.6   8.38  )-----------( 315      ( 04-20 @ 17:10 )             37.1         MEDICATIONS  (STANDING):  acetaminophen     Tablet .. 975 milliGRAM(s) Oral <User Schedule>  ceFAZolin   IVPB 3000 milliGRAM(s) IV Intermittent every 8 hours  diphtheria/tetanus/pertussis (acellular) Vaccine (Adacel) 0.5 milliLiter(s) IntraMuscular once  heparin   Injectable 21895 Unit(s) SubCutaneous every 12 hours  ibuprofen  Tablet. 600 milliGRAM(s) Oral every 6 hours  ketorolac   Injectable 30 milliGRAM(s) IV Push every 6 hours  lactated ringers. 1000 milliLiter(s) (125 mL/Hr) IV Continuous <Continuous>  oxytocin Infusion 333.333 milliUNIT(s)/Min (1000 mL/Hr) IV Continuous <Continuous>  oxytocin Infusion 333.333 milliUNIT(s)/Min (1000 mL/Hr) IV Continuous <Continuous>    MEDICATIONS  (PRN):  diphenhydrAMINE 25 milliGRAM(s) Oral every 6 hours PRN Pruritus  lanolin Ointment 1 Application(s) Topical every 6 hours PRN Sore Nipples  magnesium hydroxide Suspension 30 milliLiter(s) Oral two times a day PRN Constipation  oxyCODONE    IR 5 milliGRAM(s) Oral every 3 hours PRN Moderate to Severe Pain (4-10)  oxyCODONE    IR 5 milliGRAM(s) Oral once PRN Moderate to Severe Pain (4-10)  simethicone 80 milliGRAM(s) Chew every 4 hours PRN Gas   R1 Progress Note    Patient seen and examined at bedside. No acute overnight events. Pt reports one episode of vomiting overnight but denies n/v this AM. Pt reports intermittent wheezing. Patient is ambulating and tolerating regular diet. Has not yet passed flatus. Desai is still in place. Denies CP, SOB, N/V, HA, blurred vision, epigastric pain.    Vital Signs Last 24 Hours  T(C): 36.7 (05-02-23 @ 05:01), Max: 36.9 (05-01-23 @ 15:45)  HR: 81 (05-02-23 @ 05:01) (65 - 90)  BP: 139/76 (05-02-23 @ 05:01) (132/71 - 167/74)  RR: 20 (05-02-23 @ 05:01) (16 - 25)  SpO2: 95% (05-02-23 @ 05:01) (91% - 99%)    I&O's Summary    01 May 2023 07:01  -  02 May 2023 07:00  --------------------------------------------------------  IN: 4925 mL / OUT: 911 mL / NET: 4014 mL      Physical Exam:  General: NAD  CV: RRR  Lungs: CTAB however audible expiratory wheeze noted  Abdomen: Soft, non-tender, non-distended, fundus firm  Incision: Pfannenstiel incision CDI, staples in place  Pelvic: Lochia wnl    Labs:    Blood Type: O Positive  Antibody Screen: Negative  RPR: Negative               11.7   13.42 )-----------( 268      ( 05-01 @ 22:50 )             36.5                11.4   7.62  )-----------( 276      ( 05-01 @ 12:55 )             35.2                11.6   8.38  )-----------( 315      ( 04-20 @ 17:10 )             37.1         MEDICATIONS  (STANDING):  acetaminophen     Tablet .. 975 milliGRAM(s) Oral <User Schedule>  ceFAZolin   IVPB 3000 milliGRAM(s) IV Intermittent every 8 hours  diphtheria/tetanus/pertussis (acellular) Vaccine (Adacel) 0.5 milliLiter(s) IntraMuscular once  heparin   Injectable 97826 Unit(s) SubCutaneous every 12 hours  ibuprofen  Tablet. 600 milliGRAM(s) Oral every 6 hours  ketorolac   Injectable 30 milliGRAM(s) IV Push every 6 hours  lactated ringers. 1000 milliLiter(s) (125 mL/Hr) IV Continuous <Continuous>  oxytocin Infusion 333.333 milliUNIT(s)/Min (1000 mL/Hr) IV Continuous <Continuous>  oxytocin Infusion 333.333 milliUNIT(s)/Min (1000 mL/Hr) IV Continuous <Continuous>    MEDICATIONS  (PRN):  diphenhydrAMINE 25 milliGRAM(s) Oral every 6 hours PRN Pruritus  lanolin Ointment 1 Application(s) Topical every 6 hours PRN Sore Nipples  magnesium hydroxide Suspension 30 milliLiter(s) Oral two times a day PRN Constipation  oxyCODONE    IR 5 milliGRAM(s) Oral every 3 hours PRN Moderate to Severe Pain (4-10)  oxyCODONE    IR 5 milliGRAM(s) Oral once PRN Moderate to Severe Pain (4-10)  simethicone 80 milliGRAM(s) Chew every 4 hours PRN Gas

## 2023-05-02 NOTE — CONSULT NOTE ADULT - ATTENDING COMMENTS
37F with class III obesity, fatty liver with cholestasis of pregnancy, SARAH not on CPAP who prseents with wheezing and elevated blood pressure s/p . Pulmonary consulted for dyspnea and evaluation of wheezing.  No significant wheezing on exam, though focal faint wheezing in LLL.  Can you albuterol prn for now.  Dyspnea is likely multifactorial in setting of pregnancy, obesity, and possibly fluid overload.  Check TTE.  Will need formal PFT as outpatient.

## 2023-05-02 NOTE — DISCHARGE NOTE OB - NS MD DC FALL RISK RISK
For information on Fall & Injury Prevention, visit: [email or web address] OR  [email or web address] OR  https://www.cdc.gov/steadi/patient.html

## 2023-05-02 NOTE — DISCHARGE NOTE OB - CARE PLAN
Principal Discharge DX:	S/P  section  Assessment and plan of treatment:	Instructions:  You underwent a repeat mid transverse  delivery.  Make your follow-up appointment with your doctor as ordered.   No heavy lifting, driving, or strenuous activity for 6 weeks.   Nothing per vagina such as tampons, intercourse, douches or tub baths for 6 weeks or until you see your doctor.   Call your doctor with any signs and symptoms of infection such as fever, chills, nausea or vomiting. Call your doctor with redness or swelling at the incision site, fluid leakage or wound separation. Call your doctor if you're unable to tolerate food, increase in vaginal bleeding, or have difficulty urinating. Call your doctor if you have pain that is not relieved by your prescribed medications. Notify your doctor with any of concerns.    Follow up:  Please f/u in 5-7 days in clinic for removal of staples.  Please f/u in 2 weeks for an incision check and for a postpartum appointment in 4-6 weeks @Ambulatory Clinic Unit, [facility], Oncology [facility], basement floor. Please call the office for an appointment ([phone]).  Secondary Diagnosis:	Preeclampsia  Assessment and plan of treatment:	Given a prescription for a blood pressure cuff to monitor your blood pressure at home. Call your doctor if your blood pressure is greater than or equal to 160 systolic (top number) or 110 diastolic (bottom number), or you experience a headache unrelieved by OTC medications, blurred vision, or difficulty breathing.  Secondary Diagnosis:	Wheezing  Assessment and plan of treatment:	You were seen by pulmonology here for wheezing. You were given albuterol nebulizers with improvement in wheezing. Your oxygen saturation remained normal on room air. Please follow up with pulmonology on  outpatient. Prescription sent for Albuterol inhaler for wheezing as needed.  Secondary Diagnosis:	Mild pulmonary hypertension  Assessment and plan of treatment:	You underwent an echocardiogram which demonstrated mild pulmonary hypertension which was stable from your previous echo. Continue to follow up with Cardio-OB outpatient.  Secondary Diagnosis:	Intrahepatic cholestasis of pregnancy  Assessment and plan of treatment:	Your liver function tests were monitored throughout your stay and were somewhat elevated but stable for your baseline.  Secondary Diagnosis:	Status post bilateral salpingectomy  Assessment and plan of treatment:	You underwent a bilateral salpingectomy for permanent contraception.   1 Principal Discharge DX:	S/P  section  Assessment and plan of treatment:	Instructions:  Please follow up on  for BP check and removal of staples, please call clinic for an appointment.  You underwent a repeat mid transverse  delivery.  Make your follow-up appointment with your doctor as ordered.   No heavy lifting, driving, or strenuous activity for 6 weeks.   Make sure to keep the incision as dry as possible to prevent infection, you can use a blow dryer over the incision to keep the area dry.  Nothing per vagina such as tampons, intercourse, douches or tub baths for 6 weeks or until you see your doctor.   Call your doctor with any signs and symptoms of infection such as fever, chills, nausea or vomiting. Call your doctor with redness or swelling at the incision site, fluid leakage or wound separation. Call your doctor if you're unable to tolerate food, increase in vaginal bleeding, or have difficulty urinating. Call your doctor if you have pain that is not relieved by your prescribed medications. Notify your doctor with any of concerns.    Follow up:  Please f/u in 2-4 days in clinic for removal of staples and BP check.  Please f/u in 2 weeks for an incision check and for a postpartum appointment in 4-6 weeks @Ambulatory Clinic Unit, [facility], [facility], [facility], [place] floor. Please call the office for an appointment ([phone]).  Secondary Diagnosis:	Preeclampsia  Assessment and plan of treatment:	Given a prescription for a blood pressure cuff to monitor your blood pressure at home. Call your doctor if your blood pressure is greater than or equal to 160 systolic (top number) or 110 diastolic (bottom number), or you experience a headache unrelieved by OTC medications, blurred vision, or difficulty breathing.  Secondary Diagnosis:	Wheezing  Assessment and plan of treatment:	You were seen by pulmonology here for wheezing. You were given albuterol nebulizers with improvement in wheezing. Your oxygen saturation remained normal on room air. Please follow up with pulmonology on  outpatient. Prescription sent for Albuterol inhaler for wheezing as needed.  Secondary Diagnosis:	Mild pulmonary hypertension  Assessment and plan of treatment:	You underwent an echocardiogram which demonstrated mild pulmonary hypertension which was stable from your previous echo. Continue to follow up with Cardio-OB outpatient.  Secondary Diagnosis:	Intrahepatic cholestasis of pregnancy  Assessment and plan of treatment:	Your liver function tests were monitored throughout your stay and were somewhat elevated but stable for your baseline.  Secondary Diagnosis:	Status post bilateral salpingectomy  Assessment and plan of treatment:	You underwent a bilateral salpingectomy for permanent contraception.

## 2023-05-02 NOTE — DISCHARGE NOTE OB - COMMUNITY RESOURCE NAME:
Patient instructed to make a follow up appointment at [facility] at [facility], [phone] for 2-3 weeks from her delivery date. Patient also instructed to make a follow up appointment for the baby at with her pediatrician Dr Lowry  for 1-2 days after discharge.

## 2023-05-02 NOTE — DISCHARGE NOTE OB - PATIENT PORTAL LINK FT
You can access the FollowMyHealth Patient Portal offered by [facility] by registering at the following website: [email or web address]. By joining [employer]’s FollowMyHealth portal, you will also be able to view your health information using other applications (apps) compatible with our system.

## 2023-05-02 NOTE — PROGRESS NOTE ADULT - ATTENDING COMMENTS
Associate Chief of L & D ( late entry)   I have met this patient for the first time today.  She was sent from the clinic yesterday with wheezing and elevated BP's She was admitted and delivered by Dr Orta     OB Progress Note:  Delivery, POD#1    S: 38yo POD#1 s/p repeat c section-  MTCS and BS .  Patient denies any complaints however has an audible expiratory wheeze    O:   Vital Signs Last 24 Hrs  T(C): 36.9 (02 May 2023 13:23), Max: 37.1 (02 May 2023 09:43)  T(F): 98.4 (02 May 2023 13:23), Max: 98.8 (02 May 2023 09:43)  HR: 84 (02 May 2023 13:23) (65 - 90)  BP: 123/72 (02 May 2023 13:23) (123/72 - 167/74)  BP(mean): 109 (02 May 2023 01:35) (98 - 109)  RR: 18 (02 May 2023 13:23) (16 - 25)  SpO2: 99% (02 May 2023 13:23) (91% - 99%)  Parameters below as of 02 May 2023 13:23  Patient On (Oxygen Delivery Method): room air    Labs:  Blood type: O Positive  Rubella IgG: RPR: Negative                          9.6<L>   13.67<H> >-----------< 272    (  @ 11:53 )             31.2<L>                        9.9<L>   13.88<H> >-----------< 282    (  @ 07:29 )             32.3<L>                        11.7   13.42<H> >-----------< 268    (  @ 22:50 )             36.5                        11.4<L>   7.62 >-----------< 276    (  @ 12:55 )             35.2    23 @ 11:53  134<L>  |  101  |  17  ----------------------------<  79  4.6   |  21<L>  |  0.94    23 @ 07:29  139  |  106  |  16  ----------------------------<  95  5.1   |  21<L>  |  0.99    23 @ 22:50  139  |  107  |  12  ----------------------------<  103<H>  4.5   |  19<L>  |  0.62    23 @ 12:55  143  |  107  |  10  ----------------------------<  104<H>  3.9   |  21<L>  |  0.51  Ca    8.3<L>      02 May 2023 11:53  Ca    8.4      02 May 2023 07:29  Ca    8.6      01 May 2023 22:50  Ca    8.7      01 May 2023 12:5  TPro  5.4<L>  /  Alb  2.6<L>  /  TBili  <0.2  /  DBili  x   /  AST  55<H>  /  ALT  27  /  AlkPhos  106  23 @ 11:53  TPro  5.5<L>  /  Alb  2.7<L>  /  TBili  0.2  /  DBili  x   /  AST  61<H>  /  ALT  31  /  AlkPhos  110  23 @ 07:29  TPro  6.0  /  Alb  2.9<L>  /  TBili  <0.2  /  DBili  x   /  AST  69<H>  /  ALT  35<H>  /  AlkPhos  122<H>  23 @ 22:50  TPro  6.2  /  Alb  3.2<L>  /  TBili  <0.2  /  DBili  x   /  AST  60<H>  /  ALT  38<H>  /  AlkPhos  131<H>  23 @ 12:55    CXR clear    PE:  Lungs: clear bilateral   Abdomen: Mildly distended, appropriately tender  incision c/d/i. staples in place   Extremities: No erythema, +1  edema    A/P: 38yo POD#1 s/p repeat c section-  MTCS and BS.  complicated by Wheezing and PEC    - Continue regular diet.  - Increase ambulation.  - Continue Motrin, Tylenol, oxycodone PRN for pain control.  vs. continue PCEA for pain.  - F/u AM CBC  - Get pulmonary consult   - Repeat ECHO last done in pregnancy 3/23  - Cardio Ob     Anna Willi Dotson M.D., M.B.A., M.S. Associate Chief of L & D ( late entry)   I have met this patient for the first time today.  She was sent from the clinic yesterday with wheezing and elevated BP's She was admitted and delivered by Dr Orta     OB Progress Note:  Delivery, POD#1    S: 38yo POD#1 s/p repeat c section-  MTCS and BS .  Patient denies any complaints however has an audible expiratory wheeze    O:   Vital Signs Last 24 Hrs  T(C): 36.9 (02 May 2023 13:23), Max: 37.1 (02 May 2023 09:43)  T(F): 98.4 (02 May 2023 13:23), Max: 98.8 (02 May 2023 09:43)  HR: 84 (02 May 2023 13:23) (65 - 90)  BP: 123/72 (02 May 2023 13:23) (123/72 - 167/74)  BP(mean): 109 (02 May 2023 01:35) (98 - 109)  RR: 18 (02 May 2023 13:23) (16 - 25)  SpO2: 99% (02 May 2023 13:23) (91% - 99%)  Parameters below as of 02 May 2023 13:23  Patient On (Oxygen Delivery Method): room air    Labs:  Blood type: O Positive  Rubella IgG: RPR: Negative                          9.6<L>   13.67<H> >-----------< 272    (  @ 11:53 )             31.2<L>                        9.9<L>   13.88<H> >-----------< 282    (  @ 07:29 )             32.3<L>                        11.7   13.42<H> >-----------< 268    (  @ 22:50 )             36.5                        11.4<L>   7.62 >-----------< 276    (  @ 12:55 )             35.2    23 @ 11:53  134<L>  |  101  |  17  ----------------------------<  79  4.6   |  21<L>  |  0.94    23 @ 07:29  139  |  106  |  16  ----------------------------<  95  5.1   |  21<L>  |  0.99    23 @ 22:50  139  |  107  |  12  ----------------------------<  103<H>  4.5   |  19<L>  |  0.62    23 @ 12:55  143  |  107  |  10  ----------------------------<  104<H>  3.9   |  21<L>  |  0.51  Ca    8.3<L>      02 May 2023 11:53  Ca    8.4      02 May 2023 07:29  Ca    8.6      01 May 2023 22:50  Ca    8.7      01 May 2023 12:5  TPro  5.4<L>  /  Alb  2.6<L>  /  TBili  <0.2  /  DBili  x   /  AST  55<H>  /  ALT  27  /  AlkPhos  106  23 @ 11:53  TPro  5.5<L>  /  Alb  2.7<L>  /  TBili  0.2  /  DBili  x   /  AST  61<H>  /  ALT  31  /  AlkPhos  110  23 @ 07:29  TPro  6.0  /  Alb  2.9<L>  /  TBili  <0.2  /  DBili  x   /  AST  69<H>  /  ALT  35<H>  /  AlkPhos  122<H>  23 @ 22:50  TPro  6.2  /  Alb  3.2<L>  /  TBili  <0.2  /  DBili  x   /  AST  60<H>  /  ALT  38<H>  /  AlkPhos  131<H>  23 @ 12:55    CXR clear    PE:  Lungs: clear bilateral   Abdomen: Mildly distended, appropriately tender  incision c/d/i. staples in place   Extremities: No erythema, +1  edema    A/P: 38yo POD#1 s/p repeat c section-  MTCS and BS.  complicated by Wheezing and PEC. Patient with borderline Pulmonary HTN on ECHO from 3/2023    - Continue regular diet.  - Increase ambulation.  - Continue Motrin, Tylenol, oxycodone PRN for pain control.  vs. continue PCEA for pain.  - F/u AM CBC  - Get pulmonary consult   - Repeat ECHO last done in pregnancy 3/23  - Cardio Ob     Anna Willi Dotson M.D., M.B.A., M.S.

## 2023-05-03 LAB
ALBUMIN SERPL ELPH-MCNC: 2.9 G/DL — LOW (ref 3.3–5)
ALP SERPL-CCNC: 107 U/L — SIGNIFICANT CHANGE UP (ref 40–120)
ALT FLD-CCNC: 26 U/L — SIGNIFICANT CHANGE UP (ref 4–33)
ANION GAP SERPL CALC-SCNC: 9 MMOL/L — SIGNIFICANT CHANGE UP (ref 7–14)
APTT BLD: 28 SEC — SIGNIFICANT CHANGE UP (ref 27–36.3)
AST SERPL-CCNC: 82 U/L — HIGH (ref 4–32)
BASOPHILS # BLD AUTO: 0.02 K/UL — SIGNIFICANT CHANGE UP (ref 0–0.2)
BASOPHILS NFR BLD AUTO: 0.2 % — SIGNIFICANT CHANGE UP (ref 0–2)
BILIRUB SERPL-MCNC: <0.2 MG/DL — SIGNIFICANT CHANGE UP (ref 0.2–1.2)
BUN SERPL-MCNC: 16 MG/DL — SIGNIFICANT CHANGE UP (ref 7–23)
CALCIUM SERPL-MCNC: 8.7 MG/DL — SIGNIFICANT CHANGE UP (ref 8.4–10.5)
CHLORIDE SERPL-SCNC: 106 MMOL/L — SIGNIFICANT CHANGE UP (ref 98–107)
CO2 SERPL-SCNC: 23 MMOL/L — SIGNIFICANT CHANGE UP (ref 22–31)
CREAT SERPL-MCNC: 0.79 MG/DL — SIGNIFICANT CHANGE UP (ref 0.5–1.3)
EGFR: 99 ML/MIN/1.73M2 — SIGNIFICANT CHANGE UP
EOSINOPHIL # BLD AUTO: 0.16 K/UL — SIGNIFICANT CHANGE UP (ref 0–0.5)
EOSINOPHIL NFR BLD AUTO: 1.5 % — SIGNIFICANT CHANGE UP (ref 0–6)
GLUCOSE SERPL-MCNC: 85 MG/DL — SIGNIFICANT CHANGE UP (ref 70–99)
HCT VFR BLD CALC: 31.5 % — LOW (ref 34.5–45)
HGB BLD-MCNC: 9.8 G/DL — LOW (ref 11.5–15.5)
IANC: 7.23 K/UL — SIGNIFICANT CHANGE UP (ref 1.8–7.4)
IMM GRANULOCYTES NFR BLD AUTO: 0.4 % — SIGNIFICANT CHANGE UP (ref 0–0.9)
LDH SERPL L TO P-CCNC: 218 U/L — SIGNIFICANT CHANGE UP (ref 135–225)
LYMPHOCYTES # BLD AUTO: 2.21 K/UL — SIGNIFICANT CHANGE UP (ref 1–3.3)
LYMPHOCYTES # BLD AUTO: 21.3 % — SIGNIFICANT CHANGE UP (ref 13–44)
MCHC RBC-ENTMCNC: 28.2 PG — SIGNIFICANT CHANGE UP (ref 27–34)
MCHC RBC-ENTMCNC: 31.1 GM/DL — LOW (ref 32–36)
MCV RBC AUTO: 90.8 FL — SIGNIFICANT CHANGE UP (ref 80–100)
MONOCYTES # BLD AUTO: 0.74 K/UL — SIGNIFICANT CHANGE UP (ref 0–0.9)
MONOCYTES NFR BLD AUTO: 7.1 % — SIGNIFICANT CHANGE UP (ref 2–14)
NEUTROPHILS # BLD AUTO: 7.23 K/UL — SIGNIFICANT CHANGE UP (ref 1.8–7.4)
NEUTROPHILS NFR BLD AUTO: 69.5 % — SIGNIFICANT CHANGE UP (ref 43–77)
NRBC # BLD: 0 /100 WBCS — SIGNIFICANT CHANGE UP (ref 0–0)
NRBC # FLD: 0 K/UL — SIGNIFICANT CHANGE UP (ref 0–0)
PLATELET # BLD AUTO: 268 K/UL — SIGNIFICANT CHANGE UP (ref 150–400)
POTASSIUM SERPL-MCNC: 4.8 MMOL/L — SIGNIFICANT CHANGE UP (ref 3.5–5.3)
POTASSIUM SERPL-SCNC: 4.8 MMOL/L — SIGNIFICANT CHANGE UP (ref 3.5–5.3)
PROT SERPL-MCNC: 5.9 G/DL — LOW (ref 6–8.3)
RBC # BLD: 3.47 M/UL — LOW (ref 3.8–5.2)
RBC # FLD: 15.9 % — HIGH (ref 10.3–14.5)
SODIUM SERPL-SCNC: 138 MMOL/L — SIGNIFICANT CHANGE UP (ref 135–145)
URATE SERPL-MCNC: 7.9 MG/DL — HIGH (ref 2.5–7)
WBC # BLD: 10.4 K/UL — SIGNIFICANT CHANGE UP (ref 3.8–10.5)
WBC # FLD AUTO: 10.4 K/UL — SIGNIFICANT CHANGE UP (ref 3.8–10.5)

## 2023-05-03 PROCEDURE — 99232 SBSQ HOSP IP/OBS MODERATE 35: CPT | Mod: GC

## 2023-05-03 RX ADMIN — Medication 975 MILLIGRAM(S): at 08:58

## 2023-05-03 RX ADMIN — ALBUTEROL 2.5 MILLIGRAM(S): 90 AEROSOL, METERED ORAL at 08:20

## 2023-05-03 RX ADMIN — Medication 975 MILLIGRAM(S): at 09:50

## 2023-05-03 RX ADMIN — Medication 600 MILLIGRAM(S): at 02:28

## 2023-05-03 RX ADMIN — HEPARIN SODIUM 10000 UNIT(S): 5000 INJECTION INTRAVENOUS; SUBCUTANEOUS at 17:43

## 2023-05-03 RX ADMIN — Medication 600 MILLIGRAM(S): at 23:21

## 2023-05-03 RX ADMIN — Medication 600 MILLIGRAM(S): at 12:20

## 2023-05-03 RX ADMIN — HEPARIN SODIUM 10000 UNIT(S): 5000 INJECTION INTRAVENOUS; SUBCUTANEOUS at 05:42

## 2023-05-03 RX ADMIN — Medication 200 MILLIGRAM(S): at 09:55

## 2023-05-03 RX ADMIN — Medication 975 MILLIGRAM(S): at 16:00

## 2023-05-03 RX ADMIN — Medication 600 MILLIGRAM(S): at 23:55

## 2023-05-03 RX ADMIN — Medication 600 MILLIGRAM(S): at 06:40

## 2023-05-03 RX ADMIN — Medication 600 MILLIGRAM(S): at 17:43

## 2023-05-03 RX ADMIN — Medication 975 MILLIGRAM(S): at 22:50

## 2023-05-03 RX ADMIN — Medication 600 MILLIGRAM(S): at 01:39

## 2023-05-03 RX ADMIN — Medication 200 MILLIGRAM(S): at 17:43

## 2023-05-03 RX ADMIN — Medication 600 MILLIGRAM(S): at 07:40

## 2023-05-03 RX ADMIN — MAGNESIUM HYDROXIDE 30 MILLILITER(S): 400 TABLET, CHEWABLE ORAL at 11:26

## 2023-05-03 RX ADMIN — Medication 975 MILLIGRAM(S): at 22:12

## 2023-05-03 RX ADMIN — Medication 200 MILLIGRAM(S): at 01:42

## 2023-05-03 RX ADMIN — Medication 600 MILLIGRAM(S): at 11:23

## 2023-05-03 RX ADMIN — Medication 975 MILLIGRAM(S): at 15:04

## 2023-05-03 NOTE — PROGRESS NOTE ADULT - SUBJECTIVE AND OBJECTIVE BOX
R1 Progress Note    Patient seen and examined at bedside, no acute overnight events. No acute complaints, pain well controlled. Patient is ambulating and tolerating regular diet. Voiding spontaneously and passing flatus. Denies CP, SOB, N/V, HA, blurred vision, epigastric pain.    Vital Signs Last 24 Hours  T(C): 36.8 (05-03-23 @ 06:01), Max: 37.1 (05-02-23 @ 09:43)  HR: 78 (05-03-23 @ 06:01) (78 - 95)  BP: 141/89 (05-03-23 @ 06:01) (119/81 - 141/89)  RR: 18 (05-03-23 @ 06:01) (18 - 19)  SpO2: 95% (05-03-23 @ 06:01) (95% - 99%)    I&O's Summary    01 May 2023 07:01  -  02 May 2023 07:00  --------------------------------------------------------  IN: 4925 mL / OUT: 911 mL / NET: 4014 mL    02 May 2023 07:01  -  03 May 2023 06:21  --------------------------------------------------------  IN: 500 mL / OUT: 1515 mL / NET: -1015 mL    Physical Exam:  General: NAD  Abdomen: Soft, non-tender, non-distended, fundus firm  Incision: Pfannenstiel incision CDI, subcuticular suture closure  Pelvic: Lochia wnl    Labs:    Blood Type: O Positive  Antibody Screen: Negative  RPR: Negative               9.6    13.67 )-----------( 272      ( 05-02 @ 11:53 )             31.2                9.9    13.88 )-----------( 282      ( 05-02 @ 07:29 )             32.3                11.7   13.42 )-----------( 268      ( 05-01 @ 22:50 )             36.5         MEDICATIONS  (STANDING):  acetaminophen     Tablet .. 975 milliGRAM(s) Oral <User Schedule>  ceFAZolin   IVPB 3000 milliGRAM(s) IV Intermittent every 8 hours  diphtheria/tetanus/pertussis (acellular) Vaccine (Adacel) 0.5 milliLiter(s) IntraMuscular once  heparin   Injectable 26281 Unit(s) SubCutaneous every 12 hours  ibuprofen  Tablet. 600 milliGRAM(s) Oral every 6 hours  oxytocin Infusion 333.333 milliUNIT(s)/Min (1000 mL/Hr) IV Continuous <Continuous>  oxytocin Infusion 333.333 milliUNIT(s)/Min (1000 mL/Hr) IV Continuous <Continuous>    MEDICATIONS  (PRN):  albuterol    0.083% 2.5 milliGRAM(s) Nebulizer every 6 hours PRN Wheezing  albuterol    90 MICROgram(s) HFA Inhaler 1 Puff(s) Inhalation every 4 hours PRN Wheezing  diphenhydrAMINE 25 milliGRAM(s) Oral every 6 hours PRN Pruritus  lanolin Ointment 1 Application(s) Topical every 6 hours PRN Sore Nipples  magnesium hydroxide Suspension 30 milliLiter(s) Oral two times a day PRN Constipation  oxyCODONE    IR 5 milliGRAM(s) Oral every 3 hours PRN Moderate to Severe Pain (4-10)  oxyCODONE    IR 5 milliGRAM(s) Oral once PRN Moderate to Severe Pain (4-10)  simethicone 80 milliGRAM(s) Chew every 4 hours PRN Gas   R1 Progress Note    Patient seen and examined at bedside, no acute overnight events. Pt reports intermittent wheezing, pain well controlled. Patient is ambulating and tolerating regular diet. Voiding spontaneously and passing flatus. Denies CP, N/V, HA, blurred vision, epigastric pain.    Vital Signs Last 24 Hours  T(C): 36.8 (05-03-23 @ 06:01), Max: 37.1 (05-02-23 @ 09:43)  HR: 78 (05-03-23 @ 06:01) (78 - 95)  BP: 141/89 (05-03-23 @ 06:01) (119/81 - 141/89)  RR: 18 (05-03-23 @ 06:01) (18 - 19)  SpO2: 95% (05-03-23 @ 06:01) (95% - 99%)    I&O's Summary    01 May 2023 07:01  -  02 May 2023 07:00  --------------------------------------------------------  IN: 4925 mL / OUT: 911 mL / NET: 4014 mL    02 May 2023 07:01  -  03 May 2023 06:21  --------------------------------------------------------  IN: 500 mL / OUT: 1515 mL / NET: -1015 mL    Physical Exam:  General: NAD  Abdomen: Soft, non-tender, non-distended, fundus firm  Incision: Pfannenstiel incision CDI, staples in place  Pelvic: Lochia wnl    Labs:    Blood Type: O Positive  Antibody Screen: Negative  RPR: Negative               9.6    13.67 )-----------( 272      ( 05-02 @ 11:53 )             31.2                9.9    13.88 )-----------( 282      ( 05-02 @ 07:29 )             32.3                11.7   13.42 )-----------( 268      ( 05-01 @ 22:50 )             36.5         MEDICATIONS  (STANDING):  acetaminophen     Tablet .. 975 milliGRAM(s) Oral <User Schedule>  ceFAZolin   IVPB 3000 milliGRAM(s) IV Intermittent every 8 hours  diphtheria/tetanus/pertussis (acellular) Vaccine (Adacel) 0.5 milliLiter(s) IntraMuscular once  heparin   Injectable 49414 Unit(s) SubCutaneous every 12 hours  ibuprofen  Tablet. 600 milliGRAM(s) Oral every 6 hours  oxytocin Infusion 333.333 milliUNIT(s)/Min (1000 mL/Hr) IV Continuous <Continuous>  oxytocin Infusion 333.333 milliUNIT(s)/Min (1000 mL/Hr) IV Continuous <Continuous>    MEDICATIONS  (PRN):  albuterol    0.083% 2.5 milliGRAM(s) Nebulizer every 6 hours PRN Wheezing  albuterol    90 MICROgram(s) HFA Inhaler 1 Puff(s) Inhalation every 4 hours PRN Wheezing  diphenhydrAMINE 25 milliGRAM(s) Oral every 6 hours PRN Pruritus  lanolin Ointment 1 Application(s) Topical every 6 hours PRN Sore Nipples  magnesium hydroxide Suspension 30 milliLiter(s) Oral two times a day PRN Constipation  oxyCODONE    IR 5 milliGRAM(s) Oral every 3 hours PRN Moderate to Severe Pain (4-10)  oxyCODONE    IR 5 milliGRAM(s) Oral once PRN Moderate to Severe Pain (4-10)  simethicone 80 milliGRAM(s) Chew every 4 hours PRN Gas

## 2023-05-03 NOTE — PROGRESS NOTE ADULT - ASSESSMENT
36y/o  POD#2 from TCS+BS, , c/b PEC. PMH significant for fatty liver disease, SARAH, and BMI 64. H/H 11.7/36.5. Pt with mildly elevated BPs overnight but not meeting criteria for sPEC at this time. Pt with audible expiratory wheeze noted on exam. Overall pt recovering well post-operatively.    #PEC  - Overnight BPs 110-120/70-80s, WNL  - Pt declining sx of sPEC this AM  - HELLP labs WNL on admission, P/C 3.6  - F/u AM HELLP labs  - Continue to monitor BPs and for si/sx of sPEC    #Wheezing  - Pt with hx of SARAH, not adherent with CPAP due to difficulty fitting machine to face  - Pt reports following with pulmonologist 1y ago during work-up for potential gastric sleeve procedure however unsure of name or any diagnosis  - Pt reporting new-onset wheezing since   - SpO2 95-98% on RA  - Incentive spirometry encouraged and witnessed at bedside  - Will consult pulmonology for further recs    #Post-operative state  - Continue with po analgesia  - Increase ambulation  - Continue regular diet  - Check CBC  - Incision dressing removed  - HSQ 10k BID for DVT ppx  - Ancef x48h for incision site infection prophylaxis  - PP contraception: BS performed    Kimberlee Alonzo, PGY1 38y/o  POD#2 from rMTCS+BS, , c/b PEC. PMH significant for fatty liver disease, SARAH, and BMI 64. H/H 9.6/31.2. Pt with audible expiratory wheeze noted on exam but SpO2 >95% on RA. Overall pt recovering well post-operatively.    #PEC  - Overnight BPs 110-120/70-80s, WNL  - Pt declining sx of sPEC this AM  - HELLP labs WNL on admission, P/C 3.6  - F/u AM HELLP labs  - Continue to monitor BPs and for si/sx of sPEC    #Wheezing  - Pt with hx of SARAH, not adherent with CPAP due to difficulty fitting machine to face  - Pt reports following with pulmonologist 1y ago during work-up for potential gastric sleeve procedure however unsure of name or any diagnosis  - Pt reporting new-onset wheezing since   - SpO2 95-98% on RA  - Incentive spirometry encouraged and witnessed at bedside  - Pulm recs: Albuterol nebs PRN for wheezing, TTE  - Cardio-OB recs: TTE, no further work-up indicated at this time  - TTE (): EF 56%, mild-mod MR, mild pulmonary HTN. Stable from prior TTE in 3/2023  - Will order CPAP machine while inpatient  -  overnight given h/o SARAH    #Post-operative state  - Continue with po analgesia  - Increase ambulation  - Continue regular diet  - Incision dressing removed  - HSQ 10k BID for DVT ppx  - Ancef x48h for incision site infection prophylaxis  - PP contraception: BS performed    Kimberlee Alonzo, PGY1 38y/o  POD#2 from rMTCS+BS, , c/b PEC. PMH significant for fatty liver disease, SARAH, and BMI 64. H/H 9.6/31.2. Pt with audible expiratory wheeze noted on exam but SpO2 >95% on RA. Overall pt recovering well post-operatively.    #PEC  - Overnight BPs 110-120/70-80s, WNL  - Pt declining sx of sPEC this AM  - HELLP labs notable for transaminitis however pt with known fatty liver dz and cholestasis, AST/ALT 60/38->69/35->61/30->55/27->82/26  - P/C 3.6  - F/u AM HELLP labs  - Continue to monitor BPs and for si/sx of sPEC    #Wheezing  - Pt with hx of SARAH, not adherent with CPAP due to difficulty fitting machine to face  - Pt reports following with pulmonologist 1y ago during work-up for potential gastric sleeve procedure however unsure of name or any diagnosis  - Pt reporting new-onset wheezing since   - SpO2 95-98% on RA  - Incentive spirometry encouraged and witnessed at bedside  - Pulm recs: Albuterol nebs PRN for wheezing, TTE  - Cardio-OB recs: TTE, no further work-up indicated at this time  - TTE (): EF 56%, mild-mod MR, mild pulmonary HTN. Stable from prior TTE in 3/2023  - Will order CPAP machine while inpatient  -  overnight given h/o SARAH    #Post-operative state  - Continue with po analgesia  - Increase ambulation  - Continue regular diet  - Incision dressing removed  - HSQ 10k BID for DVT ppx  - Ancef x48h for incision site infection prophylaxis  - PP contraception: BS performed    Kimberlee Alonzo, PGY1

## 2023-05-03 NOTE — PROGRESS NOTE ADULT - ATTENDING COMMENTS
37F with class III obesity, fatty liver with cholestasis of pregnancy, SARAH not on CPAP who presents with wheezing and elevated blood pressure s/p . Pulmonary consulted for dyspnea and evaluation of wheezing.  Dyspnea is likely multifactorial in setting of pregnancy, obesity, and possibly fluid overload.  Will need formal PFT as outpatient.  Can be discharged with albuterol MDI.  Follow up as outpatient.

## 2023-05-03 NOTE — PROGRESS NOTE ADULT - SUBJECTIVE AND OBJECTIVE BOX
CHIEF COMPLAINT: Wheezing    Interval Events: No acute events. Patient reports feeling well today. She denies wheezing and cough.    REVIEW OF SYSTEMS:  Constitutional: No fevers or chills.   Eyes: No itching or discharge from the eyes  ENT: No ear pain. No ear discharge. No nasal congestion.   CV: No chest pain. No palpitations. No lightheadedness or dizziness.   Resp: No dyspnea at rest. No dyspnea on exertion.   GI: No nausea. No vomiting. No diarrhea.  MSK: No joint pain or pain in any extremities  Integumentary: No skin lesions. No pedal edema.  Neurological: No gross motor weakness. No sensory changes.  [x] All other systems negative  [ ] Unable to assess ROS because ________    OBJECTIVE:  ICU Vital Signs Last 24 Hrs  T(C): 36.8 (03 May 2023 06:01), Max: 36.9 (02 May 2023 13:23)  T(F): 98.2 (03 May 2023 06:01), Max: 98.5 (02 May 2023 18:56)  HR: 78 (03 May 2023 08:22) (78 - 95)  BP: 141/89 (03 May 2023 06:01) (119/81 - 141/89)  BP(mean): --  ABP: --  ABP(mean): --  RR: 18 (03 May 2023 06:01) (18 - 18)  SpO2: 95% (03 May 2023 06:01) (95% - 99%)    O2 Parameters below as of 03 May 2023 08:22  Patient On (Oxygen Delivery Method): room air              - @ 07:01  -   @ 07:00  --------------------------------------------------------  IN: 500 mL / OUT: 1515 mL / NET: -1015 mL      CAPILLARY BLOOD GLUCOSE          PHYSICAL EXAM:  General: Awake, alert, oriented X 3.   HEENT: Atraumatic, normocephalic.   Lymph Nodes: No palpable lymphadenopathy  Neck: No JVD. No carotid bruit.   Respiratory: Normal chest expansion. Clear to auscultation bilaterally.  Cardiovascular: S1 S2 normal. No murmurs, rubs or gallops.   Abdomen: Soft, non-tender, non-distended. No organomegaly.  Extremities: Warm to touch. Peripheral pulse palpable. No pedal edema.   Skin: No rashes or skin lesions  Neurological: Motor and sensory examination equal and normal in all four extremities.  Psychiatry: Appropriate mood and affect.    HOSPITAL MEDICATIONS:  MEDICATIONS  (STANDING):  acetaminophen     Tablet .. 975 milliGRAM(s) Oral <User Schedule>  ceFAZolin   IVPB 3000 milliGRAM(s) IV Intermittent every 8 hours  diphtheria/tetanus/pertussis (acellular) Vaccine (Adacel) 0.5 milliLiter(s) IntraMuscular once  heparin   Injectable 57467 Unit(s) SubCutaneous every 12 hours  ibuprofen  Tablet. 600 milliGRAM(s) Oral every 6 hours  oxytocin Infusion 333.333 milliUNIT(s)/Min (1000 mL/Hr) IV Continuous <Continuous>  oxytocin Infusion 333.333 milliUNIT(s)/Min (1000 mL/Hr) IV Continuous <Continuous>    MEDICATIONS  (PRN):  albuterol    0.083% 2.5 milliGRAM(s) Nebulizer every 6 hours PRN Wheezing  albuterol    90 MICROgram(s) HFA Inhaler 1 Puff(s) Inhalation every 4 hours PRN Wheezing  diphenhydrAMINE 25 milliGRAM(s) Oral every 6 hours PRN Pruritus  lanolin Ointment 1 Application(s) Topical every 6 hours PRN Sore Nipples  magnesium hydroxide Suspension 30 milliLiter(s) Oral two times a day PRN Constipation  oxyCODONE    IR 5 milliGRAM(s) Oral every 3 hours PRN Moderate to Severe Pain (4-10)  oxyCODONE    IR 5 milliGRAM(s) Oral once PRN Moderate to Severe Pain (4-10)  simethicone 80 milliGRAM(s) Chew every 4 hours PRN Gas      LABS:                        9.8    10.40 )-----------( 268      ( 03 May 2023 05:55 )             31.5         138  |  106  |  16  ----------------------------<  85  4.8   |  23  |  0.79    Ca    8.7      03 May 2023 05:55    TPro  5.9<L>  /  Alb  2.9<L>  /  TBili  <0.2  /  DBili  x   /  AST  82<H>  /  ALT  26  /  AlkPhos  107  05-03    PT/INR - ( 02 May 2023 11:53 )   PT: 12.0 sec;   INR: 1.03 ratio         PTT - ( 03 May 2023 05:55 )  PTT:28.0 sec  Urinalysis Basic - ( 01 May 2023 13:35 )    Color: Yellow / Appearance: Slightly Turbid / S.029 / pH: x  Gluc: x / Ketone: Negative  / Bili: Negative / Urobili: <2 mg/dL   Blood: x / Protein: >600 mg/dL / Nitrite: Negative   Leuk Esterase: Moderate / RBC: 5 /HPF / WBC 35 /HPF   Sq Epi: x / Non Sq Epi: x / Bacteria: Moderate            MICROBIOLOGY:     RADIOLOGY:  [ ] Reviewed and interpreted by me    Point of Care Ultrasound Findings:    PFT:    EKG:

## 2023-05-03 NOTE — PROGRESS NOTE ADULT - ASSESSMENT
37F  with PMHx fatty Liver with cholestasis of pregnancy, Obesity (BMI 64), Sleep apnea not using CPAP presented to the hospital from OB clinic for evaluation for wheezing and elevated BP. Reported wheezing for 2 days and shortness of breath with walking and lying flat.    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 Will e-mail our clinic for a 48 hour post-discharge televisit and will update note once scheduled    Discussed with Dr. Bacilio Mason, PGY-7  Pulmonary and Critical Care Medicine  [id number]  with PMHx fatty Liver with cholestasis of pregnancy, Obesity (BMI 64), Sleep apnea not using CPAP presented to the hospital from OB clinic for evaluation for wheezing and elevated BP. Reported wheezing for 2 days and shortness of breath with walking and lying flat.    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 Patient has appointment with Dr. Viviana Quiroz on  at 9:30am, televisit    Discussed with Dr. Bacilio Mason, PGY-7  Pulmonary and Critical Care Medicine  [id number]

## 2023-05-03 NOTE — PROGRESS NOTE ADULT - ATTENDING COMMENTS
Pt seen and evaluated at bedside  Agree with above  BPs WNL, transaminitis likely 2/2 to known hx of fatty liver disease  Appreciate pulm and cardiology recs  Pt feels well, pain controlled  Increase ambulation  Continue PP care    dave PENALOZA

## 2023-05-04 ENCOUNTER — NON-APPOINTMENT (OUTPATIENT)
Age: 38
End: 2023-05-04

## 2023-05-04 VITALS
DIASTOLIC BLOOD PRESSURE: 74 MMHG | HEART RATE: 87 BPM | OXYGEN SATURATION: 98 % | RESPIRATION RATE: 18 BRPM | SYSTOLIC BLOOD PRESSURE: 143 MMHG | TEMPERATURE: 98 F

## 2023-05-04 RX ORDER — ASPIRIN/CALCIUM CARB/MAGNESIUM 324 MG
2 TABLET ORAL
Refills: 0 | DISCHARGE

## 2023-05-04 RX ORDER — ACETAMINOPHEN 500 MG
3 TABLET ORAL
Qty: 0 | Refills: 0 | DISCHARGE
Start: 2023-05-04

## 2023-05-04 RX ORDER — URSODIOL 250 MG/1
1 TABLET, FILM COATED ORAL
Refills: 0 | DISCHARGE

## 2023-05-04 RX ORDER — IBUPROFEN 200 MG
1 TABLET ORAL
Qty: 0 | Refills: 0 | DISCHARGE
Start: 2023-05-04

## 2023-05-04 RX ORDER — ALBUTEROL 90 UG/1
2 AEROSOL, METERED ORAL
Qty: 1 | Refills: 3
Start: 2023-05-04

## 2023-05-04 RX ADMIN — Medication 975 MILLIGRAM(S): at 02:56

## 2023-05-04 RX ADMIN — Medication 600 MILLIGRAM(S): at 05:13

## 2023-05-04 RX ADMIN — Medication 600 MILLIGRAM(S): at 12:30

## 2023-05-04 RX ADMIN — HEPARIN SODIUM 10000 UNIT(S): 5000 INJECTION INTRAVENOUS; SUBCUTANEOUS at 05:14

## 2023-05-04 RX ADMIN — Medication 975 MILLIGRAM(S): at 15:30

## 2023-05-04 RX ADMIN — Medication 975 MILLIGRAM(S): at 14:59

## 2023-05-04 RX ADMIN — Medication 975 MILLIGRAM(S): at 08:50

## 2023-05-04 RX ADMIN — Medication 975 MILLIGRAM(S): at 09:30

## 2023-05-04 RX ADMIN — Medication 600 MILLIGRAM(S): at 11:55

## 2023-05-04 RX ADMIN — Medication 975 MILLIGRAM(S): at 03:30

## 2023-05-04 RX ADMIN — Medication 600 MILLIGRAM(S): at 05:50

## 2023-05-04 NOTE — PROGRESS NOTE ADULT - SUBJECTIVE AND OBJECTIVE BOX
R1 Progress Note    Patient seen and examined at bedside, no acute overnight events. Pain well controlled. Patient is ambulating and tolerating regular diet. Voiding spontaneously and passing flatus, pt reports having a regular BM yesterday. Denies CP, SOB, N/V, HA, blurred vision, epigastric pain. Pt reports wheezing is intermittent, denies wheezing thus far this AM.    Vital Signs Last 24 Hours  T(C): 36.8 (05-04-23 @ 05:57), Max: 37.1 (05-03-23 @ 22:46)  HR: 91 (05-04-23 @ 05:57) (78 - 100)  BP: 142/91 (05-04-23 @ 05:57) (123/64 - 142/91)  RR: 18 (05-04-23 @ 05:57) (18 - 19)  SpO2: 98% (05-04-23 @ 05:57) (95% - 100%)    I&O's Summary    03 May 2023 07:01  -  04 May 2023 07:00  --------------------------------------------------------  IN: 0 mL / OUT: 230 mL / NET: -230 mL        Physical Exam:  General: NAD  CV: RRR  Lungs: CTAB  Abdomen: Soft, non-tender, non-distended, fundus firm  Incision: Pfannenstiel incision CDI, staples in place  Pelvic: Lochia wnl    Labs:    Blood Type: O Positive  Antibody Screen: Negative  RPR: Negative               9.8    10.40 )-----------( 268      ( 05-03 @ 05:55 )             31.5                9.6    13.67 )-----------( 272      ( 05-02 @ 11:53 )             31.2                9.9    13.88 )-----------( 282      ( 05-02 @ 07:29 )             32.3         MEDICATIONS  (STANDING):  acetaminophen     Tablet .. 975 milliGRAM(s) Oral <User Schedule>  diphtheria/tetanus/pertussis (acellular) Vaccine (Adacel) 0.5 milliLiter(s) IntraMuscular once  heparin   Injectable 90459 Unit(s) SubCutaneous every 12 hours  ibuprofen  Tablet. 600 milliGRAM(s) Oral every 6 hours  oxytocin Infusion 333.333 milliUNIT(s)/Min (1000 mL/Hr) IV Continuous <Continuous>  oxytocin Infusion 333.333 milliUNIT(s)/Min (1000 mL/Hr) IV Continuous <Continuous>    MEDICATIONS  (PRN):  albuterol    0.083% 2.5 milliGRAM(s) Nebulizer every 6 hours PRN Wheezing  albuterol    90 MICROgram(s) HFA Inhaler 1 Puff(s) Inhalation every 4 hours PRN Wheezing  diphenhydrAMINE 25 milliGRAM(s) Oral every 6 hours PRN Pruritus  lanolin Ointment 1 Application(s) Topical every 6 hours PRN Sore Nipples  magnesium hydroxide Suspension 30 milliLiter(s) Oral two times a day PRN Constipation  oxyCODONE    IR 5 milliGRAM(s) Oral every 3 hours PRN Moderate to Severe Pain (4-10)  oxyCODONE    IR 5 milliGRAM(s) Oral once PRN Moderate to Severe Pain (4-10)  simethicone 80 milliGRAM(s) Chew every 4 hours PRN Gas

## 2023-05-04 NOTE — PROGRESS NOTE ADULT - ASSESSMENT
38y/o  POD#3 from rMTCS+BS, , c/b PEC. PMH significant for fatty liver disease, SARAH, and BMI 64. H/H 9.8/31.5. Pt with intermittent wheezing but SpO2 >95% on RA, being closely followed by pulmonology. Overall pt recovering well post-operatively.    #PEC  - Overnight BPs 120-130/80s, WNL  - Pt declining sx of sPEC this AM  - HELLP labs notable for transaminitis however pt with known fatty liver dz and cholestasis, AST/ALT 60/38->69/35->61/30->55/27->82/26  - P/C 3.6  - Continue to monitor BPs and for si/sx of sPEC  - Pt counseled on measuring BPs 3x/d at home and BP parameters/warning signs of sPEC    #Wheezing  - Pt with hx of SARAH, not adherent with CPAP due to difficulty fitting machine to face  - Pt reporting new-onset wheezing since   - SpO2 % on RA overnight  - Incentive spirometry encouraged and witnessed at bedside  - Pulm recs: Albuterol nebs PRN for wheezing, TTE, outpatient f/u visit made for   - Cardio-OB recs: TTE, no further work-up indicated at this time  - TTE (): EF 56%, mild-mod MR, mild pulmonary HTN. Stable from prior TTE in 3/2023  - Will order CPAP machine while inpatient. Pt refusing as she wants to be mobile to care for baby  -  overnight given h/o SARAH    #Post-operative state  - Continue with po analgesia  - Increase ambulation  - Continue regular diet  - HSQ 10k BID for DVT ppx  - S/p Ancef x48h for incision site infection prophylaxis  - PP contraception: BS performed  - No labs  - Discharge planning    Kimberlee Alonzo, PGY1

## 2023-05-04 NOTE — PROGRESS NOTE ADULT - ATTENDING COMMENTS
Associate Chief of L & D ( late entry)   I have met this patient for the first time today.  She was sent from the clinic yesterday with wheezing and elevated BP's She was admitted and delivered by Dr Orta     OB Progress Note:  Delivery, POD#3    S: 38yo POD#3 s/p repeat c section-  MTCS and BS .  Patient denies any complaints however has an audible expiratory wheeze    O:   Vital Signs Last 24 Hrs  T(C): 36.7 (04 May 2023 09:50), Max: 37.1 (03 May 2023 22:46)  T(F): 98.1 (04 May 2023 09:50), Max: 98.7 (03 May 2023 22:46)  HR: 99 (04 May 2023 09:50) (91 - 100)  BP: 131/80 (04 May 2023 09:50) (123/64 - 142/91)  RR: 19 (04 May 2023 09:50) (18 - 19)  SpO2: 99% (04 May 2023 09:50) (95% - 100%)    Parameters below as of 04 May 2023 09:50  Patient On (Oxygen Delivery Method): room air        Labs:  Blood type: O Positive  Rubella IgG: RPR: Negative                          9.6<L>   13.67<H> >-----------< 272    (  @ 11:53 )             31.2<L>                        9.9<L>   13.88<H> >-----------< 282    (  @ 07:29 )             32.3<L>                        11.7   13.42<H> >-----------< 268    (  @ 22:50 )             36.5                        11.4<L>   7.62 >-----------< 276    (  @ 12:55 )             35.2    23 @ 11:53  134<L>  |  101  |  17  ----------------------------<  79  4.6   |  21<L>  |  0.94    23 @ 07:29  139  |  106  |  16  ----------------------------<  95  5.1   |  21<L>  |  0.99    23 @ 22:50  139  |  107  |  12  ----------------------------<  103<H>  4.5   |  19<L>  |  0.62    23 @ 12:55  143  |  107  |  10  ----------------------------<  104<H>  3.9   |  21<L>  |  0.51  3    CXR clear    PE:  Lungs: clear bilateral   Abdomen: soft, fundus firm, Mildly distended, appropriately tender, Moist under the panniculus  incision c/d/i. staples in place   Extremities: No erythema, +1  edema    A/P: 38yo POD#3 s/p repeat c section-  MTCS and BS.  complicated by PEC improvement on the wheezing.     -  Patient is stable for discharge and will follow up in the PP clinic  - F/U with  pulmonary   - Cardio Ob   - Staples to be removed in the clinic  - Instructed on keeping the area as dry as possible after showering     Anna Rodriguez M.D., M.B.A., M.S.

## 2023-05-05 ENCOUNTER — NON-APPOINTMENT (OUTPATIENT)
Age: 38
End: 2023-05-05

## 2023-05-05 ENCOUNTER — APPOINTMENT (OUTPATIENT)
Dept: PULMONOLOGY | Facility: CLINIC | Age: 38
End: 2023-05-05
Payer: MEDICAID

## 2023-05-05 PROCEDURE — 99496 TRANSJ CARE MGMT HIGH F2F 7D: CPT | Mod: 95

## 2023-05-05 RX ORDER — URSODIOL 300 MG/1
300 CAPSULE ORAL
Qty: 60 | Refills: 0 | Status: DISCONTINUED | COMMUNITY
Start: 2023-04-10 | End: 2023-05-05

## 2023-05-05 RX ORDER — FLUTICASONE PROPIONATE 50 UG/1
50 SPRAY, METERED NASAL
Qty: 10 | Refills: 0 | Status: DISCONTINUED | COMMUNITY
Start: 2022-10-12 | End: 2023-05-05

## 2023-05-05 RX ORDER — PRENATAL VIT,CAL 76/IRON/FOLIC 29 MG-1 MG
29-1 TABLET ORAL
Qty: 30 | Refills: 0 | Status: DISCONTINUED | COMMUNITY
Start: 2022-12-27 | End: 2023-05-05

## 2023-05-05 RX ORDER — URSODIOL 300 MG/1
300 CAPSULE ORAL
Qty: 60 | Refills: 0 | Status: DISCONTINUED | COMMUNITY
Start: 2023-03-22 | End: 2023-05-05

## 2023-05-05 RX ORDER — URSODIOL 500 MG/1
500 TABLET ORAL TWICE DAILY
Qty: 60 | Refills: 1 | Status: DISCONTINUED | COMMUNITY
Start: 2023-04-10 | End: 2023-05-05

## 2023-05-05 RX ORDER — FAMOTIDINE 20 MG/1
20 TABLET, FILM COATED ORAL
Qty: 30 | Refills: 3 | Status: DISCONTINUED | COMMUNITY
Start: 2023-01-17 | End: 2023-05-05

## 2023-05-05 RX ORDER — URSODIOL 300 MG/1
300 CAPSULE ORAL
Qty: 90 | Refills: 1 | Status: DISCONTINUED | COMMUNITY
Start: 2023-01-11 | End: 2023-05-05

## 2023-05-05 RX ORDER — OMEPRAZOLE 20 MG/1
20 CAPSULE, DELAYED RELEASE ORAL TWICE DAILY
Qty: 120 | Refills: 0 | Status: DISCONTINUED | COMMUNITY
Start: 2023-01-26 | End: 2023-05-05

## 2023-05-05 NOTE — ASSESSMENT
[FreeTextEntry1] : 37-year-old woman with a past medical history of obesity BMI of 64, sleep apnea who presents after hospital admission for .  While admitted she had episodes of wheezing and shortness of breath and so pulmonary was consulted.  Presentation was thought to be multifactorial and related to obesity, fluid overload in the setting of pregnancy and preeclampsia.\par \par She has known SARAH.  She has a Resvent machine at home.  She is nonadherent as she does not like the way her mask fits.  We had a long discussion regarding the importance of adherence to CPAP.  She is calling the company to try to troubleshoot the fitting.  We will also arrange for inpatient follow-up if she chooses to follow with [facility].\par \par Regarding wheezing she requires formal pulmonary function tests.  Wheezing occurs at night when she lays down to sleep.  She has not yet used her albuterol.  She is picking up albuterol from pharmacy today.  Educated on proper use of inhaler.\par \par

## 2023-05-05 NOTE — PHYSICAL EXAM
[de-identified] : On exam via video she is awake, alert, and in no acute distress. She is speaking in full sentences. There is no overt wheezing or frequent cough. She does not appear dyspneic or in any respiratory distress. She is answering questions appropriately.

## 2023-05-05 NOTE — HISTORY OF PRESENT ILLNESS
[Home] : at home, [unfilled] , at the time of the visit. [Medical Office: ([facility])___] : at the medical office located in  [Verbal consent obtained from patient] : the patient, [unfilled] [[facility]] : Post-hospitalization from [facility] [Sleep disturbance] : Sleep disturbance [Yes] : Yes [Admitted on: ___] : The patient was admitted on [unfilled] [Discharged on ___] : discharged on [unfilled] [Discharge Summary] : discharge summary [Pertinent Labs] : pertinent labs [Discharge Med List] : discharge medication list [Patient Contacted By: ____] : and contacted by [unfilled] [FreeTextEntry2] : \par 37-year-old woman with a past medical history of sleep apnea not using her CPAP, cholestasis of pregnancy, obesity BMI 64 who was admitted to the hospital for a  delivery presents posthospital discharge follow-up for pulmonary.\par \par During her hospitalization pulmonary was consulted as she had wheezing, dyspnea.  She reports intermittent shortness of breath, worse with pregnancy.  She states she has intermittent wheezing especially when she lays on her back.  No known official diagnosis of asthma in the past.  No other triggers other than lying down to rest.  She states she is more dyspneic at night.  She states she had been diagnosed with sleep apnea in the past and she received her CPAP machine in 2022.  She has Resvent CPAP .  AHI was 27.3.  She states she has not been using it because the mask felt uncomfortable.  We had a long discussion about the importance of adherence to CPAP machine especially given recent hospitalization and dyspnea.\par \par She has not yet picked up her albuterol though she states he is at the pharmacy.  She is not in respiratory distress at this time.  Currently she is not short of breath and she is not wheezing.  She otherwise feels well. \par \par \par

## 2023-05-07 ENCOUNTER — TRANSCRIPTION ENCOUNTER (OUTPATIENT)
Age: 38
End: 2023-05-07

## 2023-05-08 ENCOUNTER — APPOINTMENT (OUTPATIENT)
Dept: GASTROENTEROLOGY | Facility: CLINIC | Age: 38
End: 2023-05-08
Payer: MEDICAID

## 2023-05-08 VITALS
BODY MASS INDEX: 55.32 KG/M2 | HEIGHT: 61 IN | DIASTOLIC BLOOD PRESSURE: 110 MMHG | HEART RATE: 102 BPM | SYSTOLIC BLOOD PRESSURE: 163 MMHG | OXYGEN SATURATION: 94 % | WEIGHT: 293 LBS | TEMPERATURE: 97.5 F

## 2023-05-08 PROCEDURE — 99214 OFFICE O/P EST MOD 30 MIN: CPT

## 2023-05-08 NOTE — ASSESSMENT
[FreeTextEntry1] : Dyspepsia: The patient complains of dyspeptic symptoms.  The patient was advised to continue to abide by an anti-gas (low FOD-MAP) diet.  The patient was previously given a pamphlet for anti-gas (low FOD-MAP).  The patient and I reviewed the anti-gas (low FOD-MAP)diet at length again. The patient is to continue on a trial of Simethicone one tablet 4 times a day p.r.n. abdominal pain and gas.\par GERD: The patient was advised to avoid late-night meals and dietary indiscretions.  The patient was advised to avoid fried and fatty foods.  The patient was advised to abide by an anti-GERD diet. The patient was given a pamphlet for anti-GERD.  The patient and I reviewed the anti-GERD diet at length. I recommend continue on a trial of famotidine 40 mg twice a day PRN for the symptoms.\par History of Cholestasis of Pregnancy: The patient was previously diagnosed with cholestasis of pregnancy. The patient is being followed by her hepatologist, Dr. Jeffrey Hoang. The patient is currently off ursodiol 300 mg twice a day for the cholestasis of pregnancy, s/p delivery. The patient was advised to follow-up with her hepatologist regarding the results of the blood work and abdominal ultrasound.\par Obesity: The patient is morbidly obese. The patient was advised to lose weight and exercise. The patient was advised to followup with a nutritionist regarding dieting for the weight loss. The patient cannot lose weight. The patient is being evaluated with bariatric surgeon. The patient is being followed for bariatric surgery for obesity with for possible gastric sleeve surgery after delivery. The patient agrees and will follow-up. I will try to obtain the prior upper endoscopy to assess results and any contraindications for bariatric surgery. The patient agreed and will followup.\par H. pylori Gastritis: The patient was found to have H. pylori gastritis on prior upper endoscopy. The patient completed a trial of Clarithromycin 500 mg 2 times a day, Amoxicillin 500 mg 2 tablets twice a day and Omeprazole 40 mg twice a day for 14 days for H. pylori eradication. The patient is s/p treatment for H. pylori eradication but did not have followup testing to assess for eradication. The H. pylori breath test performed on January 9, 2023 was detected. I recommend a trial of antibiotics for H. pylori eradication s/p delivery.   I recommend a trial of Metronidazole 500 mg 3 times a day, Doxycycline 100mg twice a day, Omeprazole 40 mg twice a day and Pepto-Bismol two tablets 4 times a day for 14 days for H. pylori eradication.  The patient is to return in 2 months for H. pylori breath test to assess for eradication of the bacteria.  \par Gastritis: The patient has a history of gastritis noted on prior upper endoscopy. The patient is to avoid nonsteroidal anti-inflammatory drugs and aspirin. I recommend continue on a trial of Famotidine 20 mg twice a day PRN for 3 months for the symptoms. \par Elevated Liver Enzymes, S/P Delivery: The patient had elevated liver enzymes noted on prior blood work of unclear etiology. The patient denies any jaundice or pruritus. The patient denies any alcohol use. The patient denies taking large doses of nonsteroidal anti-inflammatory drugs or acetaminophen. I had a long discussion with the patient regarding the elevated liver enzymes and the possible progression of the liver disease. The patient was told of the possible increased risk of developing liver failure, ascites, renal impairment, ascites, GI bleeding, hepatic encephalopathy, bleeding tendencies . The patient was told of the importance of follow-up. The patient was advised to follow up every month for blood work. I recommend avoid alcohol and hepato-toxic agents. I recommend avoiding large doses of nonsteroidal anti-inflammatory drugs or acetaminophen. The patient agreed and will follow-up to reassess the symptoms. The patient is being followed by hepatologist, Dr. Jeffrey Hoang for the elevated liver enzymes.\par Fatty Liver: The patient had an imaging study suggestive of fatty infiltration of the liver. The patient denies any jaundice or pruritus. The patient denies any alcohol use. The patient denies taking large doses of nonsteroidal anti-inflammatory drugs or acetaminophen. The findings are suggestive of fatty liver. The patient and I had a long discussion regarding the risks of fatty liver and possible progression to cirrhosis. The patient was told of the possible increased risk of developing liver failure, cirrhosis, ascites, GI bleeding secondary to varices, hepatic encephalopathy, bleeding tendencies and liver cancer. The patient was told of the importance of follow-up. The patient was advised to follow up every 6 months for blood work and imaging studies. The patient agreed and will follow up. The patient was advised to lose weight and exercise. The patient was advised to abide by a Mediterranean diet. If the liver enzymes remain elevated, the patient may require a trial of Pioglitazone for the fatty liver. I recommend avoid alcohol and hepato-toxic agents. The patient was also advised to avoid NSAIDs, Acetaminophen and any other hepatotoxic drugs. The patient was also advised not to share needles, razors, scissors, nail clippers, etc.. The patient is to continue close follow-up in our office for blood work and exams. If the liver enzymes remain elevated, the patient may require a CT guided liver biopsy to assess the liver parenchyma for possible treatment. We had a long discussion regarding the risks and benefits of the procedure. The patient was told of the risks of bleeding, perforation, infections, emergency surgery and missing lesions. The patient agreed and will follow-up to reassess the symptoms. \par Follow-up: The patient is to follow-up in the office in 2 months to reassess the symptoms. The patient was told to call the office if any further problems. \par \par \par

## 2023-05-08 NOTE — HISTORY OF PRESENT ILLNESS
[FreeTextEntry1] : The patient is s/p delivery of health baby boy on May 1, 2023. The patient is being followed by Dr. Shahana Huff and Dr. Danica Conde and morbid obesity and by Dr. Raymundo Hernandez DO. The patient has a history of liver disease. The patient has a history of fatty liver noted on prior imaging study. The patient denies any prior exposure to hepatitis A, B or C. The patient denies any large doses of nonsteroidal anti-inflammatory drugs or acetaminophen. The patient denies sharing needles, razors, nail clippers, nail files, scissors, et cetera. The patient denies any EtOH abuse, cocaine use or intravenous drug use. The patient denies any prior blood transfusions, sexual indiscretions, tattoos or piercing. The patient admits to having prior surgery. The history of surgery is significant for a prior cholecystectomy and  x 2. The patient admits to a family history of GI and liver problems. The patient's mother had a history of gallbladder disease. The patient states that she is feeling better. The patient denies any jaundice or pruritus.  The patient denies any chronic lower back pain. The patient denies any abdominal pain.  The patient complains of abdominal gas and bloating.  The patient denies any nausea or vomiting.  The patient previously complained of gastroesophageal reflux disease that resolved after delivery.   She currently denies any gastroesophageal reflux disease or dysphagia.  The gastroesophageal reflux disease improved with proton pump inhibitors, H2 blockers and antacids.   The patient complains of shortness of breath on inhaler but denies any atypical chest pain or palpitations.  The patient denies any diaphoresis. The patient denies any constipation or diarrhea.  The patient has 3 bowel movements a day. The patient denies a change in bowel habits.  The patient denies a change in caliber of stool.  The patient denies having mucus discharge with the bowel movements.  The patient denies any bright red blood per rectum, melena or hematemesis.  The patient denies any rectal pain or rectal pruritus.  The patient denies any weight loss or anorexia. She denies any fevers or chills.   The patient had an upper endoscopy performed by another gastroenterologist, Dr. Eve Lopez on 2022. The upper endoscopy performed by another gastroenterologist, Dr. Eve Lopez on 2022 revealed a normal esophagus, irregular Z-line at 40 cm from the incisors, gastroesophageal flap valve classified as Hill grade 2 (fold present, opens with respiration), erythematous mucosa in the stomach, mucosal nodule found in the duodenum likely Brunner's gland and normal examined duodenum. The gastric pathology performed on 2022 revealed gastric antral and oxyntic mucosa with H. pylori associated chronic active gastritis that was positive for Helicobacter pylori and negative for intestinal metaplasia (stomach), duodenal mucosa with mild increase in intraepithelial lymphocytes with otherwise preserved villous architecture and mild increase in intraepithelial lymphocytes (duodenum) and polypoid duodenal mucosa with H. pylori associated chronic active peptic duodenitis that was negative for dysplasia or neoplasia (duodenal nodule). The patient is s/p treatment for H. pylori eradication but did not have followup testing to assess for eradication. The H. pylori breath test performed on 2023 was detected. I recommend a trial of antibiotics for H. pylori eradication after delivery. The abdominal ultrasound performed on 2022 revealed heterogeneous liver which may reflect steatosis. The blood work performed on 2023 revealed no evidence of anemia with a hemoglobin/hematocrit level of 11.7/39.2, respectively, a normal PT/INR/PTT of 12.0/1.02/31.0, respectively, a low CO2 of 21 mmol/L, and elevated blood glucose of 114 mg/dL, a normal total bilirubin of <0.2 mg/dL, and elevated AST/ALT of 113/60/U/L, respectively, a normal alkaline phosphatase of 119/U/L, a normal uric acid level of 6.1 mg/dL, and elevated total bile acids of 7.0 µmol/L, and elevated fibrinogen level of 718 mg/dL. The abdominal ultrasound performed on April 3, 2023 revealed hepatic steatosis, hepatomegaly and partially imaged gravid uterus. The patient is being followed by hepatology, Dr. Jeffrey Hoang. The patient is currently off ursodiol 300 mg twice a day for cholestasis of pregnancy. The patient's last menstrual period was on 2022. The patient's periods are regular at 28 to 30 days. The patient's menstrual periods are light for 3 to 4 days. The patient is a . The patient's first menstrual period was at age 13. The patient denies any significant family history of GI problems. \par \par (-) smoking, (-) ETOH, (-) IVDA\par  [de-identified] : The upper endoscopy performed by another gastroenterologist, Dr. Eve Lopez on September 6, 2022 revealed a normal esophagus, irregular Z-line at 40 cm from the incisors, gastroesophageal flap valve classified as Hill grade 2 (fold present, opens with respiration), erythematous mucosa in the stomach, mucosal nodule found in the duodenum likely Brunner's gland and normal examined duodenum.  The gastric pathology performed on September 6, 2022 revealed gastric antral and oxyntic mucosa with H. pylori associated chronic active gastritis that was positive for Helicobacter pylori and negative for intestinal metaplasia (stomach), duodenal mucosa with mild increase in intraepithelial lymphocytes with otherwise preserved villous architecture and mild increase in intraepithelial lymphocytes (duodenum) and polypoid duodenal mucosa with H. pylori associated chronic active peptic duodenitis that was negative for dysplasia or neoplasia (duodenal nodule). [de-identified] : The abdominal ultrasound performed on April 3, 2023 revealed hepatic steatosis, hepatomegaly and partially imaged gravid uterus.  \par \par The abdominal ultrasound performed on December 8, 2022 revealed heterogeneous liver which may reflect steatosis.

## 2023-05-10 ENCOUNTER — APPOINTMENT (OUTPATIENT)
Dept: OBGYN | Facility: HOSPITAL | Age: 38
End: 2023-05-10
Payer: MEDICAID

## 2023-05-10 ENCOUNTER — OUTPATIENT (OUTPATIENT)
Dept: OUTPATIENT SERVICES | Facility: HOSPITAL | Age: 38
LOS: 1 days | End: 2023-05-10

## 2023-05-10 VITALS
SYSTOLIC BLOOD PRESSURE: 120 MMHG | WEIGHT: 293 LBS | HEART RATE: 96 BPM | DIASTOLIC BLOOD PRESSURE: 74 MMHG | HEIGHT: 61 IN | TEMPERATURE: 98.1 F | BODY MASS INDEX: 55.32 KG/M2

## 2023-05-10 DIAGNOSIS — Z98.891 HISTORY OF UTERINE SCAR FROM PREVIOUS SURGERY: Chronic | ICD-10-CM

## 2023-05-10 DIAGNOSIS — Z90.49 ACQUIRED ABSENCE OF OTHER SPECIFIED PARTS OF DIGESTIVE TRACT: Chronic | ICD-10-CM

## 2023-05-10 DIAGNOSIS — Z98.890 OTHER SPECIFIED POSTPROCEDURAL STATES: Chronic | ICD-10-CM

## 2023-05-10 PROCEDURE — 99212 OFFICE O/P EST SF 10 MIN: CPT | Mod: GE

## 2023-05-10 NOTE — PHYSICAL EXAM
[Appropriately responsive] : appropriately responsive [Alert] : alert [No Acute Distress] : no acute distress [Soft] : soft [Non-tender] : non-tender [Non-distended] : non-distended [FreeTextEntry7] : pannus edematous, pfannenstiel incision well-healed without discharge, staples removed at bedside

## 2023-05-10 NOTE — HISTORY OF PRESENT ILLNESS
[FreeTextEntry1] : 38 y/o  s/p rMTCS+BS on  for cholestasis and newly diagnosed preeclampsia w/ PMH of obesity, SARAH presenting for incision and BP check.\par \par Patient has been recovering well. Denies fevers, chills, discharge from incision. States that she has some pain but has been taking only Tylenol. States that home BP's have been in the 120s/70s, with the exception of one 140 systolic. Denies headache, vision changes, RUQ pain, epigastric pain. \par \par Patient had been seen in the hospital by pulmonology for wheezing; had a post-discharge televisit on .

## 2023-05-10 NOTE — END OF VISIT
[] : Resident [FreeTextEntry3] : Agree with above\par Continue BP checks at home\par RTO for postpartum visit\par \par dave PENALOZA

## 2023-05-10 NOTE — DISCUSSION/SUMMARY
[FreeTextEntry1] : 38 y/o  s/p rMTCS+BS on  for cholestasis and newly diagnosed preeclampsia w/ PMH of obesity, SARAH presenting for incision and BP check.\par \par #Post-Op Incision Check\par - Staples removed\par - Incision well-healed without discharge, edges well approximated\par - Advised patient to keep area clean and dry, provided abdominal pads\par - Recommended Tylenol/Motrin ATC for pain\par - RTC in 5 weeks for 6wk postpartum visit\par \par #Preeclampsia\par - BP in clinic 120/74\par - Patient denying symptoms of preeclampsia\par - BP well controlled\par \par d/w attending Dr Lee\par Becki Guerrero\par PGY2

## 2023-05-11 DIAGNOSIS — Z98.890 OTHER SPECIFIED POSTPROCEDURAL STATES: ICD-10-CM

## 2023-05-12 ENCOUNTER — APPOINTMENT (OUTPATIENT)
Dept: OBGYN | Facility: HOSPITAL | Age: 38
End: 2023-05-12

## 2023-05-16 LAB — SURGICAL PATHOLOGY STUDY: SIGNIFICANT CHANGE UP

## 2023-05-17 ENCOUNTER — NON-APPOINTMENT (OUTPATIENT)
Age: 38
End: 2023-05-17

## 2023-05-22 ENCOUNTER — NON-APPOINTMENT (OUTPATIENT)
Age: 38
End: 2023-05-22

## 2023-05-24 ENCOUNTER — NON-APPOINTMENT (OUTPATIENT)
Age: 38
End: 2023-05-24

## 2023-05-30 ENCOUNTER — NON-APPOINTMENT (OUTPATIENT)
Age: 38
End: 2023-05-30

## 2023-05-31 ENCOUNTER — EMERGENCY (EMERGENCY)
Facility: HOSPITAL | Age: 38
LOS: 1 days | Discharge: HOME | End: 2023-05-31
Attending: STUDENT IN AN ORGANIZED HEALTH CARE EDUCATION/TRAINING PROGRAM
Payer: MEDICAID

## 2023-05-31 ENCOUNTER — NON-APPOINTMENT (OUTPATIENT)
Age: 38
End: 2023-05-31

## 2023-05-31 VITALS
WEIGHT: 293 LBS | TEMPERATURE: 100 F | DIASTOLIC BLOOD PRESSURE: 83 MMHG | HEART RATE: 109 BPM | HEIGHT: 61 IN | SYSTOLIC BLOOD PRESSURE: 116 MMHG | RESPIRATION RATE: 18 BRPM | OXYGEN SATURATION: 96 %

## 2023-05-31 VITALS
OXYGEN SATURATION: 97 % | DIASTOLIC BLOOD PRESSURE: 66 MMHG | RESPIRATION RATE: 18 BRPM | TEMPERATURE: 99 F | HEART RATE: 90 BPM | SYSTOLIC BLOOD PRESSURE: 120 MMHG

## 2023-05-31 DIAGNOSIS — Z98.891 HISTORY OF UTERINE SCAR FROM PREVIOUS SURGERY: Chronic | ICD-10-CM

## 2023-05-31 DIAGNOSIS — Z98.890 OTHER SPECIFIED POSTPROCEDURAL STATES: Chronic | ICD-10-CM

## 2023-05-31 DIAGNOSIS — Z90.49 ACQUIRED ABSENCE OF OTHER SPECIFIED PARTS OF DIGESTIVE TRACT: Chronic | ICD-10-CM

## 2023-05-31 LAB
ALBUMIN SERPL ELPH-MCNC: 3.3 G/DL — LOW (ref 3.5–5)
ALP SERPL-CCNC: 103 U/L — SIGNIFICANT CHANGE UP (ref 40–120)
ALT FLD-CCNC: 48 U/L DA — SIGNIFICANT CHANGE UP (ref 10–60)
ANION GAP SERPL CALC-SCNC: 3 MMOL/L — LOW (ref 5–17)
APPEARANCE UR: CLEAR — SIGNIFICANT CHANGE UP
APTT BLD: 33.8 SEC — SIGNIFICANT CHANGE UP (ref 27.5–35.5)
AST SERPL-CCNC: 22 U/L — SIGNIFICANT CHANGE UP (ref 10–40)
BACTERIA # UR AUTO: ABNORMAL /HPF
BASOPHILS # BLD AUTO: 0.05 K/UL — SIGNIFICANT CHANGE UP (ref 0–0.2)
BASOPHILS NFR BLD AUTO: 0.3 % — SIGNIFICANT CHANGE UP (ref 0–2)
BILIRUB SERPL-MCNC: 0.7 MG/DL — SIGNIFICANT CHANGE UP (ref 0.2–1.2)
BILIRUB UR-MCNC: NEGATIVE — SIGNIFICANT CHANGE UP
BUN SERPL-MCNC: 9 MG/DL — SIGNIFICANT CHANGE UP (ref 7–18)
CALCIUM SERPL-MCNC: 8.6 MG/DL — SIGNIFICANT CHANGE UP (ref 8.4–10.5)
CHLORIDE SERPL-SCNC: 107 MMOL/L — SIGNIFICANT CHANGE UP (ref 96–108)
CO2 SERPL-SCNC: 29 MMOL/L — SIGNIFICANT CHANGE UP (ref 22–31)
COLOR SPEC: YELLOW — SIGNIFICANT CHANGE UP
CREAT SERPL-MCNC: 0.76 MG/DL — SIGNIFICANT CHANGE UP (ref 0.5–1.3)
DIFF PNL FLD: ABNORMAL
EGFR: 103 ML/MIN/1.73M2 — SIGNIFICANT CHANGE UP
EOSINOPHIL # BLD AUTO: 0.03 K/UL — SIGNIFICANT CHANGE UP (ref 0–0.5)
EOSINOPHIL NFR BLD AUTO: 0.2 % — SIGNIFICANT CHANGE UP (ref 0–6)
EPI CELLS # UR: SIGNIFICANT CHANGE UP /HPF
GLUCOSE SERPL-MCNC: 118 MG/DL — HIGH (ref 70–99)
GLUCOSE UR QL: NEGATIVE — SIGNIFICANT CHANGE UP
HCT VFR BLD CALC: 34.5 % — SIGNIFICANT CHANGE UP (ref 34.5–45)
HGB BLD-MCNC: 10.7 G/DL — LOW (ref 11.5–15.5)
IMM GRANULOCYTES NFR BLD AUTO: 0.4 % — SIGNIFICANT CHANGE UP (ref 0–0.9)
INR BLD: 1.27 RATIO — HIGH (ref 0.88–1.16)
KETONES UR-MCNC: NEGATIVE — SIGNIFICANT CHANGE UP
LACTATE SERPL-SCNC: 1.4 MMOL/L — SIGNIFICANT CHANGE UP (ref 0.7–2)
LEUKOCYTE ESTERASE UR-ACNC: ABNORMAL
LYMPHOCYTES # BLD AUTO: 15.7 % — SIGNIFICANT CHANGE UP (ref 13–44)
LYMPHOCYTES # BLD AUTO: 2.56 K/UL — SIGNIFICANT CHANGE UP (ref 1–3.3)
MCHC RBC-ENTMCNC: 28.1 PG — SIGNIFICANT CHANGE UP (ref 27–34)
MCHC RBC-ENTMCNC: 31 GM/DL — LOW (ref 32–36)
MCV RBC AUTO: 90.6 FL — SIGNIFICANT CHANGE UP (ref 80–100)
MONOCYTES # BLD AUTO: 1.42 K/UL — HIGH (ref 0–0.9)
MONOCYTES NFR BLD AUTO: 8.7 % — SIGNIFICANT CHANGE UP (ref 2–14)
NEUTROPHILS # BLD AUTO: 12.18 K/UL — HIGH (ref 1.8–7.4)
NEUTROPHILS NFR BLD AUTO: 74.7 % — SIGNIFICANT CHANGE UP (ref 43–77)
NITRITE UR-MCNC: NEGATIVE — SIGNIFICANT CHANGE UP
NRBC # BLD: 0 /100 WBCS — SIGNIFICANT CHANGE UP (ref 0–0)
PH UR: 6.5 — SIGNIFICANT CHANGE UP (ref 5–8)
PLATELET # BLD AUTO: 304 K/UL — SIGNIFICANT CHANGE UP (ref 150–400)
POTASSIUM SERPL-MCNC: 3.8 MMOL/L — SIGNIFICANT CHANGE UP (ref 3.5–5.3)
POTASSIUM SERPL-SCNC: 3.8 MMOL/L — SIGNIFICANT CHANGE UP (ref 3.5–5.3)
PROT SERPL-MCNC: 7.7 G/DL — SIGNIFICANT CHANGE UP (ref 6–8.3)
PROT UR-MCNC: 100 MG/DL
PROTHROM AB SERPL-ACNC: 15.2 SEC — HIGH (ref 10.5–13.4)
RBC # BLD: 3.81 M/UL — SIGNIFICANT CHANGE UP (ref 3.8–5.2)
RBC # FLD: 15 % — HIGH (ref 10.3–14.5)
RBC CASTS # UR COMP ASSIST: ABNORMAL /HPF (ref 0–2)
SODIUM SERPL-SCNC: 139 MMOL/L — SIGNIFICANT CHANGE UP (ref 135–145)
SP GR SPEC: 1.01 — SIGNIFICANT CHANGE UP (ref 1.01–1.02)
UROBILINOGEN FLD QL: 1 MG/DL
WBC # BLD: 16.3 K/UL — HIGH (ref 3.8–10.5)
WBC # FLD AUTO: 16.3 K/UL — HIGH (ref 3.8–10.5)
WBC UR QL: ABNORMAL /HPF (ref 0–5)

## 2023-05-31 PROCEDURE — 85610 PROTHROMBIN TIME: CPT

## 2023-05-31 PROCEDURE — 96374 THER/PROPH/DIAG INJ IV PUSH: CPT | Mod: XU

## 2023-05-31 PROCEDURE — 96375 TX/PRO/DX INJ NEW DRUG ADDON: CPT

## 2023-05-31 PROCEDURE — 99285 EMERGENCY DEPT VISIT HI MDM: CPT | Mod: 25

## 2023-05-31 PROCEDURE — 99285 EMERGENCY DEPT VISIT HI MDM: CPT

## 2023-05-31 PROCEDURE — 76830 TRANSVAGINAL US NON-OB: CPT | Mod: 26

## 2023-05-31 PROCEDURE — 74177 CT ABD & PELVIS W/CONTRAST: CPT | Mod: MA

## 2023-05-31 PROCEDURE — 71045 X-RAY EXAM CHEST 1 VIEW: CPT | Mod: 26

## 2023-05-31 PROCEDURE — 76856 US EXAM PELVIC COMPLETE: CPT

## 2023-05-31 PROCEDURE — 76856 US EXAM PELVIC COMPLETE: CPT | Mod: 26

## 2023-05-31 PROCEDURE — 87086 URINE CULTURE/COLONY COUNT: CPT

## 2023-05-31 PROCEDURE — 82962 GLUCOSE BLOOD TEST: CPT

## 2023-05-31 PROCEDURE — 36415 COLL VENOUS BLD VENIPUNCTURE: CPT

## 2023-05-31 PROCEDURE — 74177 CT ABD & PELVIS W/CONTRAST: CPT | Mod: 26,MA

## 2023-05-31 PROCEDURE — 83605 ASSAY OF LACTIC ACID: CPT

## 2023-05-31 PROCEDURE — 85730 THROMBOPLASTIN TIME PARTIAL: CPT

## 2023-05-31 PROCEDURE — 85025 COMPLETE CBC W/AUTO DIFF WBC: CPT

## 2023-05-31 PROCEDURE — 76830 TRANSVAGINAL US NON-OB: CPT

## 2023-05-31 PROCEDURE — 80053 COMPREHEN METABOLIC PANEL: CPT

## 2023-05-31 PROCEDURE — 87040 BLOOD CULTURE FOR BACTERIA: CPT

## 2023-05-31 PROCEDURE — 71045 X-RAY EXAM CHEST 1 VIEW: CPT

## 2023-05-31 PROCEDURE — 81001 URINALYSIS AUTO W/SCOPE: CPT

## 2023-05-31 RX ORDER — SODIUM CHLORIDE 9 MG/ML
1500 INJECTION INTRAMUSCULAR; INTRAVENOUS; SUBCUTANEOUS ONCE
Refills: 0 | Status: COMPLETED | OUTPATIENT
Start: 2023-05-31 | End: 2023-05-31

## 2023-05-31 RX ORDER — METOCLOPRAMIDE HCL 10 MG
10 TABLET ORAL ONCE
Refills: 0 | Status: COMPLETED | OUTPATIENT
Start: 2023-05-31 | End: 2023-05-31

## 2023-05-31 RX ORDER — ACETAMINOPHEN 500 MG
975 TABLET ORAL ONCE
Refills: 0 | Status: COMPLETED | OUTPATIENT
Start: 2023-05-31 | End: 2023-05-31

## 2023-05-31 RX ORDER — ONDANSETRON 8 MG/1
4 TABLET, FILM COATED ORAL ONCE
Refills: 0 | Status: COMPLETED | OUTPATIENT
Start: 2023-05-31 | End: 2023-05-31

## 2023-05-31 RX ORDER — CEFTRIAXONE 500 MG/1
1000 INJECTION, POWDER, FOR SOLUTION INTRAMUSCULAR; INTRAVENOUS ONCE
Refills: 0 | Status: COMPLETED | OUTPATIENT
Start: 2023-05-31 | End: 2023-05-31

## 2023-05-31 RX ADMIN — ONDANSETRON 4 MILLIGRAM(S): 8 TABLET, FILM COATED ORAL at 18:31

## 2023-05-31 RX ADMIN — SODIUM CHLORIDE 1500 MILLILITER(S): 9 INJECTION INTRAMUSCULAR; INTRAVENOUS; SUBCUTANEOUS at 18:31

## 2023-05-31 RX ADMIN — Medication 975 MILLIGRAM(S): at 18:29

## 2023-05-31 RX ADMIN — CEFTRIAXONE 100 MILLIGRAM(S): 500 INJECTION, POWDER, FOR SOLUTION INTRAMUSCULAR; INTRAVENOUS at 18:31

## 2023-05-31 RX ADMIN — Medication 10 MILLIGRAM(S): at 18:30

## 2023-05-31 NOTE — ED PROVIDER NOTE - CLINICAL SUMMARY MEDICAL DECISION MAKING FREE TEXT BOX
37F presenting with fever, chills and abdominal pain. patient reports she went to urgent care today and was told she had a urine infection. concern for UTI vs retained products of conception. will get labs, CT abdomen. will reassess. 37F presenting with fever, chills and abdominal pain. patient reports she went to urgent care today and was told she had a urine infection. concern for UTI vs retained products of conception. will get labs, CT abdomen. will reassess.    CT with findings concerning for uterine infection. will get US to further evaluate. will likely need GYN consult. 37F presenting with fever, chills and abdominal pain. patient reports she went to urgent care today and was told she had a urine infection. concern for UTI vs retained products of conception. will get labs, CT abdomen. will reassess.    CT with findings concerning for uterine infection. will get US to further evaluate. will likely need GYN consult.    symptoms improved. no products of conception or signs of endometritis on US. discussed admission with patient but prefers trial of PO antibiotics. given strict return precautions.

## 2023-05-31 NOTE — ED PROVIDER NOTE - OBJECTIVE STATEMENT
37F, pmh of fatty liver, presenting with 3 days of headache, chills, abdominal pain and nausea. no chest pain or trouble breathing. had  section 4 weeks ago and a few days ago she had a large blood clot come out. since then she has had abdominal pain.

## 2023-05-31 NOTE — ED PROVIDER NOTE - NSFOLLOWUPINSTRUCTIONS_ED_ALL_ED_FT
You were seen in the emergency department for fever and abdominal pain.     Please follow-up with your primary care doctor in the next 24-48 hours.     If you have any worsening symptoms, severe headahce, chest pain, trouble breathing, abdominal pain, or you are unable to tolerate food or fluids, please return to the emergency department. You were seen in the emergency department for fever and abdominal pain.     Please follow-up with your primary care doctor in the next 24-48 hours.     Please finish all of your antibiotics.     If you have any worsening symptoms, severe headahce, chest pain, trouble breathing, abdominal pain, or you are unable to tolerate food or fluids, please return to the emergency department.

## 2023-05-31 NOTE — ED ADULT NURSE NOTE - NSFALLUNIVINTERV_ED_ALL_ED
Bed/Stretcher in lowest position, wheels locked, appropriate side rails in place/Call bell, personal items and telephone in reach/Instruct patient to call for assistance before getting out of bed/chair/stretcher/Non-slip footwear applied when patient is off stretcher/[place] to call system/Physically safe environment - no spills, clutter or unnecessary equipment/Purposeful proactive rounding/Room/bathroom lighting operational, light cord in reach

## 2023-05-31 NOTE — ED ADULT NURSE NOTE - NS ED NOTE ABUSE RESPONSE YN
Detail Level: Detailed
Note Text (......Xxx Chief Complaint.): This diagnosis correlates with the
Other (Free Text): biopsied and treated with \"LN2\" at same visit.  Scar noted on exam, no residual seen.  Will get notes from visit to confirm.  Will have her f/u in 6 months to recheck site and have her come in sooner if she notes changes at site.
Yes

## 2023-05-31 NOTE — ED PROVIDER NOTE - NSICDXPASTSURGICALHX_GEN_ALL_CORE_FT
PAST SURGICAL HISTORY:  H/O dilation and curettage     History of  section     History of dilation and curettage     S/P laparoscopic cholecystectomy  or

## 2023-06-01 LAB
CULTURE RESULTS: SIGNIFICANT CHANGE UP
SPECIMEN SOURCE: SIGNIFICANT CHANGE UP

## 2023-06-02 ENCOUNTER — APPOINTMENT (OUTPATIENT)
Dept: FAMILY MEDICINE | Facility: CLINIC | Age: 38
End: 2023-06-02
Payer: MEDICAID

## 2023-06-02 VITALS
BODY MASS INDEX: 55.32 KG/M2 | WEIGHT: 293 LBS | SYSTOLIC BLOOD PRESSURE: 121 MMHG | HEART RATE: 89 BPM | DIASTOLIC BLOOD PRESSURE: 77 MMHG | TEMPERATURE: 97.4 F | OXYGEN SATURATION: 95 % | HEIGHT: 61 IN

## 2023-06-02 PROCEDURE — 99204 OFFICE O/P NEW MOD 45 MIN: CPT | Mod: 25

## 2023-06-02 RX ORDER — DOXYCYCLINE 100 MG/1
100 CAPSULE ORAL TWICE DAILY
Qty: 28 | Refills: 0 | Status: DISCONTINUED | COMMUNITY
Start: 2023-05-08 | End: 2023-06-02

## 2023-06-02 RX ORDER — FAMOTIDINE 20 MG/1
20 TABLET, FILM COATED ORAL
Qty: 180 | Refills: 3 | Status: DISCONTINUED | COMMUNITY
Start: 2023-04-10 | End: 2023-06-02

## 2023-06-02 RX ORDER — IBUPROFEN 600 MG/1
600 TABLET, FILM COATED ORAL EVERY 6 HOURS
Qty: 56 | Refills: 0 | Status: DISCONTINUED | COMMUNITY
Start: 2023-05-05 | End: 2023-06-02

## 2023-06-02 NOTE — REVIEW OF SYSTEMS
[Chills] : chills [Abdominal Pain] : abdominal pain [Headache] : headache [Fever] : no fever [Nasal Discharge] : no nasal discharge [Sore Throat] : no sore throat [Chest Pain] : no chest pain [Palpitations] : no palpitations [Shortness Of Breath] : no shortness of breath [Cough] : no cough [Constipation] : no constipation [Diarrhea] : diarrhea [Vomiting] : no vomiting [Dysuria] : no dysuria [Hematuria] : no hematuria [Frequency] : no frequency [Itching] : no itching [Skin Rash] : no skin rash [Dizziness] : no dizziness [Fainting] : no fainting [FreeTextEntry7] : +blood with wiping x1 day

## 2023-06-02 NOTE — PLAN
[FreeTextEntry1] : 1. Abd Pain with Leukocytosis\par - afebrile and non-toxic appearing\par - pt is s/p ER visit at [facility] for c/o of 3 days of abd pain(at site of  incision), chills, and headaches\par - CBC and CMP were done and were significant for WBC of 16.30.\par - BCx shows no growth to date. UCx was negative.\par - CT Abd/pelvis showed "Fluid and air in the  site in the myometrium with inflammatory changes extending into the periuterine fat into the anterior abdominal wall suspicious for infectious process." -- this is likely the source of her infection s/p  on 23\par - Transvaginal US and abd US were done and showed No evidence of retained products of conception.\par - Repeat CBC and CMP ordered\par - s/p 1 dose of IV ceftriaxone in ER; dc'ed from ER on 7 days course of Augmentin; *** Pt has noticed some improvement on Abx tx already; denies any recurrent fevers\par - Instructed pt to f/u with her OB-Gyn as soon as possible for further evaluation as pt with signs of infection at  site on CT\par - ER precautions provided for worsening/persisting symptoms or continued fevers despite abx tx\par \par 2. Blood Per Rectum\par - likely 2/2 hemorrhoids \par - Pt admits to 1 episode of blood when wiping and tenderness after BM; admits to recent constipation\par - Pt with external hemorrhoids on physical exam --- suspect hemorrhoids 2/2 constipation\par - prep-H cream prescribed\par - FOBI ordered\par - Increased hydration, ambulation, and trial of MiraLAX recommended for constipation\par - Pt to f/u with her gastroenterologist\par - ER instructions provided  -- large bloody BM's\par \par \par f/u PRN\par

## 2023-06-02 NOTE — HISTORY OF PRESENT ILLNESS
[de-identified] : Patient presents today for a CPE.\par \par Vaccinations: Flu, covid19, Tdap\par \par Last Papsmear\par \par UTD with routine dental and eye exams\par  [FreeTextEntry8] : The patient is a 38yo female who presents today for ER follow up visit. She was in the ER on  for c/o headache, chills, nausea, and abd pain x3 days. CBC and CMP were done and were significant for WBC of 16.30. BCx show no growth to date, UA was significant for large blood, 100 protein, small leuks, 6-10 WBC, 10-25 RBC. UCx is negative.\par \par CT ABd/pelvis showed "Fluid and air in the  site in the myometrium with inflammatory changes extending into the periuterine fat into the anterior abdominal wall suspicious for infectious process."\par \par Transvaginal US and abd US were done and showed No evidence of retained products of conception.\par \par Pt was treated with IV ceftriaxone and admission was recommended, but pt preferred trial of oral abx. She was discharged with a 7 day course of Abx\par \par Pt states since she started the abx denies fevers; admits to improving headaches, chills, and abd pain. Does admit to 1 episode of blood after wiping; admits to constipation. Has not yet followed up with Gyn. States she abd pain is at her incision site -- denies redness, swelling, or drainage at incision site. Denies infrequent intermittent vaginal bleeding since she had  on

## 2023-06-02 NOTE — PHYSICAL EXAM
[No Acute Distress] : no acute distress [Well Nourished] : well nourished [Well Developed] : well developed [Well-Appearing] : well-appearing [Normal Sclera/Conjunctiva] : normal sclera/conjunctiva [EOMI] : extraocular movements intact [No Lymphadenopathy] : no lymphadenopathy [Supple] : supple [No Respiratory Distress] : no respiratory distress  [No Accessory Muscle Use] : no accessory muscle use [Clear to Auscultation] : lungs were clear to auscultation bilaterally [Normal Rate] : normal rate  [Regular Rhythm] : with a regular rhythm [Normal S1, S2] : normal S1 and S2 [No Edema] : there was no peripheral edema [Soft] : abdomen soft [Non Tender] : non-tender [Normal Bowel Sounds] : normal bowel sounds [Normal Posterior Cervical Nodes] : no posterior cervical lymphadenopathy [Normal Anterior Cervical Nodes] : no anterior cervical lymphadenopathy [No CVA Tenderness] : no CVA  tenderness [No Spinal Tenderness] : no spinal tenderness [No Joint Swelling] : no joint swelling [Grossly Normal Strength/Tone] : grossly normal strength/tone [No Rash] : no rash [Coordination Grossly Intact] : coordination grossly intact [Normal Affect] : the affect was normal [de-identified] : +obese [FreeTextEntry1] : Exam done with chaperone present (HEATHER Chilel) -- 2 thrombosed external hemorrhoids visualized  [de-identified] : +large abd [de-identified] :  incision well healed with no signs of infection at incision site

## 2023-06-02 NOTE — PLAN
[FreeTextEntry1] : 1. Abd Pain with Leukocytosis\par - afebrile and non-toxic appearing\par - pt is s/p ER visit at [facility] for c/o of 3 days of abd pain(at site of  incision), chills, and headaches\par - CBC and CMP were done and were significant for WBC of 16.30.\par - BCx shows no growth to date. UCx was negative.\par - CT Abd/pelvis showed "Fluid and air in the  site in the myometrium with inflammatory changes extending into the periuterine fat into the anterior abdominal wall suspicious for infectious process." -- this is likely the source of her infection s/p  on 23\par - Transvaginal US and abd US were done and showed No evidence of retained products of conception.\par - Repeat CBC and CMP ordered\par - s/p 1 dose of IV ceftriaxone in ER; dc'ed from ER on 7 days course of Augmentin; *** Pt has noticed some improvement on Abx tx already; denies any recurrent fevers\par - Instructed pt to f/u with her OB-Gyn as soon as possible for further evaluation as pt with signs of infection at  site on CT\par - ER precautions provided for worsening/persisting symptoms or continued fevers despite abx tx\par \par 2. Blood Per Rectum\par - likely 2/2 hemorrhoids \par - Pt admits to 1 episode of blood when wiping and tenderness after BM; admits to recent constipation\par - Pt with external hemorrhoids on physical exam --- suspect hemorrhoids 2/2 constipation\par - prep-H cream prescribed\par - FOBI ordered\par - Increased hydration, ambulation, and trial of MiraLAX recommended for constipation\par - Pt to f/u with her gastroenterologist\par - ER instructions provided  -- large bloody BM's\par \par \par f/u PRN\par

## 2023-06-02 NOTE — PHYSICAL EXAM
[No Acute Distress] : no acute distress [Well Nourished] : well nourished [Well Developed] : well developed [Well-Appearing] : well-appearing [Normal Sclera/Conjunctiva] : normal sclera/conjunctiva [EOMI] : extraocular movements intact [No Lymphadenopathy] : no lymphadenopathy [Supple] : supple [No Respiratory Distress] : no respiratory distress  [No Accessory Muscle Use] : no accessory muscle use [Clear to Auscultation] : lungs were clear to auscultation bilaterally [Normal Rate] : normal rate  [Regular Rhythm] : with a regular rhythm [Normal S1, S2] : normal S1 and S2 [No Edema] : there was no peripheral edema [Soft] : abdomen soft [Non Tender] : non-tender [Normal Bowel Sounds] : normal bowel sounds [Normal Posterior Cervical Nodes] : no posterior cervical lymphadenopathy [Normal Anterior Cervical Nodes] : no anterior cervical lymphadenopathy [No CVA Tenderness] : no CVA  tenderness [No Spinal Tenderness] : no spinal tenderness [No Joint Swelling] : no joint swelling [Grossly Normal Strength/Tone] : grossly normal strength/tone [No Rash] : no rash [Coordination Grossly Intact] : coordination grossly intact [Normal Affect] : the affect was normal [de-identified] : +obese [de-identified] : +large abd [FreeTextEntry1] : Exam done with chaperone present (HEATHER Chilel) -- 2 thrombosed external hemorrhoids visualized  [de-identified] :  incision well healed with no signs of infection at incision site

## 2023-06-02 NOTE — HISTORY OF PRESENT ILLNESS
[de-identified] : Patient presents today for a CPE.\par \par Vaccinations: Flu, covid19, Tdap\par \par Last Papsmear\par \par UTD with routine dental and eye exams\par  [FreeTextEntry8] : The patient is a 36yo female who presents today for ER follow up visit. She was in the ER on  for c/o headache, chills, nausea, and abd pain x3 days. CBC and CMP were done and were significant for WBC of 16.30. BCx show no growth to date, UA was significant for large blood, 100 protein, small leuks, 6-10 WBC, 10-25 RBC. UCx is negative.\par \par CT ABd/pelvis showed "Fluid and air in the  site in the myometrium with inflammatory changes extending into the periuterine fat into the anterior abdominal wall suspicious for infectious process."\par \par Transvaginal US and abd US were done and showed No evidence of retained products of conception.\par \par Pt was treated with IV ceftriaxone and admission was recommended, but pt preferred trial of oral abx. She was discharged with a 7 day course of Abx\par \par Pt states since she started the abx denies fevers; admits to improving headaches, chills, and abd pain. Does admit to 1 episode of blood after wiping; admits to constipation. Has not yet followed up with Gyn. States she abd pain is at her incision site -- denies redness, swelling, or drainage at incision site. Denies infrequent intermittent vaginal bleeding since she had  on

## 2023-06-05 LAB
ALBUMIN SERPL ELPH-MCNC: 4 G/DL
ALP BLD-CCNC: 118 U/L
ALT SERPL-CCNC: 35 U/L
ANION GAP SERPL CALC-SCNC: 16 MMOL/L
AST SERPL-CCNC: 29 U/L
BILIRUB SERPL-MCNC: 0.3 MG/DL
BUN SERPL-MCNC: 10 MG/DL
CALCIUM SERPL-MCNC: 8.8 MG/DL
CHLORIDE SERPL-SCNC: 104 MMOL/L
CO2 SERPL-SCNC: 23 MMOL/L
CREAT SERPL-MCNC: 0.62 MG/DL
CULTURE RESULTS: SIGNIFICANT CHANGE UP
CULTURE RESULTS: SIGNIFICANT CHANGE UP
EGFR: 118 ML/MIN/1.73M2
GLUCOSE SERPL-MCNC: 84 MG/DL
POTASSIUM SERPL-SCNC: 4.2 MMOL/L
PROT SERPL-MCNC: 7.2 G/DL
SODIUM SERPL-SCNC: 143 MMOL/L
SPECIMEN SOURCE: SIGNIFICANT CHANGE UP
SPECIMEN SOURCE: SIGNIFICANT CHANGE UP

## 2023-06-06 ENCOUNTER — EMERGENCY (EMERGENCY)
Facility: HOSPITAL | Age: 38
LOS: 1 days | Discharge: HOME | End: 2023-06-06
Attending: EMERGENCY MEDICINE | Admitting: EMERGENCY MEDICINE
Payer: MEDICAID

## 2023-06-06 ENCOUNTER — OUTPATIENT (OUTPATIENT)
Dept: OUTPATIENT SERVICES | Facility: HOSPITAL | Age: 38
LOS: 1 days | End: 2023-06-06

## 2023-06-06 ENCOUNTER — APPOINTMENT (OUTPATIENT)
Dept: OBGYN | Facility: HOSPITAL | Age: 38
End: 2023-06-06
Payer: MEDICAID

## 2023-06-06 VITALS
HEART RATE: 87 BPM | SYSTOLIC BLOOD PRESSURE: 135 MMHG | BODY MASS INDEX: 55.32 KG/M2 | DIASTOLIC BLOOD PRESSURE: 86 MMHG | TEMPERATURE: 97.8 F | HEIGHT: 61 IN | WEIGHT: 293 LBS

## 2023-06-06 VITALS
OXYGEN SATURATION: 99 % | HEART RATE: 83 BPM | DIASTOLIC BLOOD PRESSURE: 103 MMHG | HEIGHT: 61 IN | TEMPERATURE: 98 F | RESPIRATION RATE: 16 BRPM | SYSTOLIC BLOOD PRESSURE: 141 MMHG

## 2023-06-06 DIAGNOSIS — Z98.891 HISTORY OF UTERINE SCAR FROM PREVIOUS SURGERY: Chronic | ICD-10-CM

## 2023-06-06 DIAGNOSIS — Z98.890 OTHER SPECIFIED POSTPROCEDURAL STATES: Chronic | ICD-10-CM

## 2023-06-06 DIAGNOSIS — Z90.49 ACQUIRED ABSENCE OF OTHER SPECIFIED PARTS OF DIGESTIVE TRACT: Chronic | ICD-10-CM

## 2023-06-06 LAB
ALBUMIN SERPL ELPH-MCNC: 3.9 G/DL — SIGNIFICANT CHANGE UP (ref 3.3–5)
ALP SERPL-CCNC: 138 U/L — HIGH (ref 40–120)
ALT FLD-CCNC: 64 U/L — HIGH (ref 4–33)
ANION GAP SERPL CALC-SCNC: 16 MMOL/L — HIGH (ref 7–14)
APTT BLD: 31.1 SEC — SIGNIFICANT CHANGE UP (ref 27–36.3)
AST SERPL-CCNC: 68 U/L — HIGH (ref 4–32)
BASE EXCESS BLDV CALC-SCNC: -2.5 MMOL/L — LOW (ref -2–3)
BASOPHILS # BLD AUTO: 0 K/UL — SIGNIFICANT CHANGE UP (ref 0–0.2)
BASOPHILS NFR BLD AUTO: 0 % — SIGNIFICANT CHANGE UP (ref 0–2)
BILIRUB SERPL-MCNC: <0.2 MG/DL — SIGNIFICANT CHANGE UP (ref 0.2–1.2)
BLOOD GAS VENOUS COMPREHENSIVE RESULT: SIGNIFICANT CHANGE UP
BUN SERPL-MCNC: 11 MG/DL — SIGNIFICANT CHANGE UP (ref 7–23)
CALCIUM SERPL-MCNC: 9.2 MG/DL — SIGNIFICANT CHANGE UP (ref 8.4–10.5)
CHLORIDE BLDV-SCNC: 107 MMOL/L — SIGNIFICANT CHANGE UP (ref 96–108)
CHLORIDE SERPL-SCNC: 103 MMOL/L — SIGNIFICANT CHANGE UP (ref 98–107)
CO2 BLDV-SCNC: 25.6 MMOL/L — SIGNIFICANT CHANGE UP (ref 22–26)
CO2 SERPL-SCNC: 19 MMOL/L — LOW (ref 22–31)
CREAT SERPL-MCNC: 0.55 MG/DL — SIGNIFICANT CHANGE UP (ref 0.5–1.3)
EGFR: 121 ML/MIN/1.73M2 — SIGNIFICANT CHANGE UP
EOSINOPHIL # BLD AUTO: 0.1 K/UL — SIGNIFICANT CHANGE UP (ref 0–0.5)
EOSINOPHIL NFR BLD AUTO: 0.9 % — SIGNIFICANT CHANGE UP (ref 0–6)
GAS PNL BLDV: 137 MMOL/L — SIGNIFICANT CHANGE UP (ref 136–145)
GAS PNL BLDV: SIGNIFICANT CHANGE UP
GLUCOSE BLDV-MCNC: 92 MG/DL — SIGNIFICANT CHANGE UP (ref 70–99)
GLUCOSE SERPL-MCNC: 94 MG/DL — SIGNIFICANT CHANGE UP (ref 70–99)
HCO3 BLDV-SCNC: 24 MMOL/L — SIGNIFICANT CHANGE UP (ref 22–29)
HCT VFR BLD CALC: 34.8 % — SIGNIFICANT CHANGE UP (ref 34.5–45)
HCT VFR BLDA CALC: 32 % — LOW (ref 34.5–46.5)
HGB BLD CALC-MCNC: 10.5 G/DL — LOW (ref 11.7–16.1)
HGB BLD-MCNC: 10.6 G/DL — LOW (ref 11.5–15.5)
IANC: 6.91 K/UL — SIGNIFICANT CHANGE UP (ref 1.8–7.4)
INR BLD: 1.14 RATIO — SIGNIFICANT CHANGE UP (ref 0.88–1.16)
LACTATE BLDV-MCNC: 1.3 MMOL/L — SIGNIFICANT CHANGE UP (ref 0.5–2)
LIDOCAIN IGE QN: 27 U/L — SIGNIFICANT CHANGE UP (ref 7–60)
LYMPHOCYTES # BLD AUTO: 27.6 % — SIGNIFICANT CHANGE UP (ref 13–44)
LYMPHOCYTES # BLD AUTO: 3.05 K/UL — SIGNIFICANT CHANGE UP (ref 1–3.3)
MAGNESIUM SERPL-MCNC: 2.2 MG/DL — SIGNIFICANT CHANGE UP (ref 1.6–2.6)
MANUAL SMEAR VERIFICATION: SIGNIFICANT CHANGE UP
MCHC RBC-ENTMCNC: 27.2 PG — SIGNIFICANT CHANGE UP (ref 27–34)
MCHC RBC-ENTMCNC: 30.5 GM/DL — LOW (ref 32–36)
MCV RBC AUTO: 89.5 FL — SIGNIFICANT CHANGE UP (ref 80–100)
MONOCYTES # BLD AUTO: 0.57 K/UL — SIGNIFICANT CHANGE UP (ref 0–0.9)
MONOCYTES NFR BLD AUTO: 5.2 % — SIGNIFICANT CHANGE UP (ref 2–14)
NEUTROPHILS # BLD AUTO: 6.95 K/UL — SIGNIFICANT CHANGE UP (ref 1.8–7.4)
NEUTROPHILS NFR BLD AUTO: 62.9 % — SIGNIFICANT CHANGE UP (ref 43–77)
PCO2 BLDV: 49 MMHG — SIGNIFICANT CHANGE UP (ref 39–52)
PH BLDV: 7.3 — LOW (ref 7.32–7.43)
PLAT MORPH BLD: NORMAL — SIGNIFICANT CHANGE UP
PLATELET # BLD AUTO: 387 K/UL — SIGNIFICANT CHANGE UP (ref 150–400)
PLATELET COUNT - ESTIMATE: NORMAL — SIGNIFICANT CHANGE UP
PO2 BLDV: 62 MMHG — HIGH (ref 25–45)
POTASSIUM BLDV-SCNC: 4.2 MMOL/L — SIGNIFICANT CHANGE UP (ref 3.5–5.1)
POTASSIUM SERPL-MCNC: 4.9 MMOL/L — SIGNIFICANT CHANGE UP (ref 3.5–5.3)
POTASSIUM SERPL-SCNC: 4.9 MMOL/L — SIGNIFICANT CHANGE UP (ref 3.5–5.3)
PROT SERPL-MCNC: 7.8 G/DL — SIGNIFICANT CHANGE UP (ref 6–8.3)
PROTHROM AB SERPL-ACNC: 13.3 SEC — SIGNIFICANT CHANGE UP (ref 10.5–13.4)
RBC # BLD: 3.89 M/UL — SIGNIFICANT CHANGE UP (ref 3.8–5.2)
RBC # FLD: 14.6 % — HIGH (ref 10.3–14.5)
RBC BLD AUTO: NORMAL — SIGNIFICANT CHANGE UP
SAO2 % BLDV: 88.5 % — HIGH (ref 67–88)
SODIUM SERPL-SCNC: 138 MMOL/L — SIGNIFICANT CHANGE UP (ref 135–145)
VARIANT LYMPHS # BLD: 3.4 % — SIGNIFICANT CHANGE UP (ref 0–6)
WBC # BLD: 11.05 K/UL — HIGH (ref 3.8–10.5)
WBC # FLD AUTO: 11.05 K/UL — HIGH (ref 3.8–10.5)

## 2023-06-06 PROCEDURE — 99213 OFFICE O/P EST LOW 20 MIN: CPT | Mod: GC

## 2023-06-06 PROCEDURE — 99285 EMERGENCY DEPT VISIT HI MDM: CPT

## 2023-06-06 RX ORDER — ACETAMINOPHEN 500 MG
975 TABLET ORAL ONCE
Refills: 0 | Status: COMPLETED | OUTPATIENT
Start: 2023-06-06 | End: 2023-06-06

## 2023-06-06 RX ADMIN — Medication 975 MILLIGRAM(S): at 21:39

## 2023-06-06 NOTE — ED ADULT TRIAGE NOTE - CHIEF COMPLAINT QUOTE
Pt seen at [place] last week for soft tissue infection of abd, was discharged on antibiotics. Reports had  in early May. Advised to come to ER by MD Metcalf for Ct scan.

## 2023-06-06 NOTE — ED PROVIDER NOTE - PHYSICAL EXAMINATION
General:  non-toxic, NAD  HEENT:  NCAT, PERRL  Cardiac:  RRR, no murmurs, 2+ peripheral pulses  Chest:  CTA  Abdomen: Tenderness to palpation specifically between the left and right upper quadrant, no rebound or guarding.  Soft, non-distended, bowel sounds present.  Extremities:  no peripheral edema, calf tenderness, or leg size discrepancies  Skin:  no rashes  Neuro:  AAOx4, 5+motor, sensory grossly intact  Psych:  mood and affect appropriate

## 2023-06-06 NOTE — ED PROVIDER NOTE - ATTENDING CONTRIBUTION TO CARE
37-year-old female G6, P6 recent  May 1, 2023 comes to ED w/ lower abdominal pain.  Patient reports that symptoms started 9 days prior, initially included fever, chills, headaches.  Patient went to [place] for work-up and was diagnosed with an infection via CT scan.  Patient at that time was offered admission however patient declined preferring p.o. outpatient management.  Patient reports that they went to see their OB/GYN Dr. Metcalf today in office and due to the ongoing pain patient was sent to the emergency department via EMS for concerns related to postop infection not improving with antibiotics, specific request included CT scanning for further evaluation. Their pain/symptom is moderate, constant, non mediating with rest, greatly improved with ongoing Augmentin use. Patient reports that their other symptoms that they initially started with have also resolved with Augmentin use. Denies falls, trauma, headache, chest pain, shortness of breath, cough, rhinorrhea, congestion, nausea, vomiting, diarrhea, melena, hematochezia, urinary symptoms, skin changes.

## 2023-06-06 NOTE — ED PROVIDER NOTE - PATIENT PORTAL LINK FT
You can access the FollowMyHealth Patient Portal offered by [facility] by registering at the following website: [email or web address]. By joining [employer]’s FollowMyHealth portal, you will also be able to view your health information using other applications (apps) compatible with our system.

## 2023-06-06 NOTE — ED PROVIDER NOTE - NSFOLLOWUPINSTRUCTIONS_ED_ALL_ED_FT
Follow up with the OBGYN clinic on 6/13.  Advance activity as tolerated.  Continue all previously prescribed medications as directed.  Follow up with your primary care physician in 48-72 hours- bring copies of your results.  Return to the ER for worsening or persistent symptoms, and/or ANY NEW OR CONCERNING SYMPTOMS. THIS INCLUDES BUT IS NOT LIMITED TO FEVER, CHILLS, NIGHTSWEATS, INCREASING PAIN OR FOR ANY OTHER SYMPTOMS THAT CONCERN YOU. If you have issues obtaining follow up, please call: [phone] to obtain a doctor or specialist who takes your insurance in your area.  You may call [phone] to make an appointment with the internal medicine clinic.

## 2023-06-06 NOTE — ED PROVIDER NOTE - OBJECTIVE STATEMENT
37-year-old female G6, P6 recent  May 1, 2023 comes to ED w/ lower abdominal pain.  Patient reports that symptoms started 9 days prior, initially included fever, chills, headaches.  Patient went to [place] for work-up and was diagnosed with an infection via CT scan.  Patient at that time was offered admission however patient declined preferring p.o. outpatient management.  Patient reports that they went to see their OB/GYN Dr. Metcalf today in office and due to the ongoing pain patient was sent to the emergency department via EMS for concerns related to postop infection not improving with antibiotics, specific request included CT scanning for further evaluation. Their pain/symptom is moderate, constant, non mediating with rest, greatly improved with ongoing Augmentin use. Patient reports that their other symptoms that they initially started with have also resolved with Augmentin use. Denies falls, trauma, headache, chest pain, shortness of breath, cough, rhinorrhea, congestion, nausea, vomiting, diarrhea, melena, hematochezia, urinary symptoms, skin changes.

## 2023-06-06 NOTE — ED ADULT TRIAGE NOTE - PAIN RATING/NUMBER SCALE (0-10): ACTIVITY
5 (moderate pain) Banner Transposition Flap Text: The defect edges were debeveled with a #15 scalpel blade.  Given the location of the defect and the proximity to free margins a Banner transposition flap was deemed most appropriate.  Using a sterile surgical marker, an appropriate flap drawn around the defect. The area thus outlined was incised deep to adipose tissue with a #15 scalpel blade.  The skin margins were undermined to an appropriate distance in all directions utilizing iris scissors.

## 2023-06-06 NOTE — ED PROVIDER NOTE - CLINICAL SUMMARY MEDICAL DECISION MAKING FREE TEXT BOX
Impression: 37-year-old female G6, P6 recent  May 1, 2023 comes to ED w/ lower abdominal pain.  Their symptoms and exam findings are concerning for possible postop infection.    Ordered labs, imaging, medications for diagnosis, management, and treatment.

## 2023-06-07 VITALS
TEMPERATURE: 99 F | SYSTOLIC BLOOD PRESSURE: 119 MMHG | HEART RATE: 93 BPM | DIASTOLIC BLOOD PRESSURE: 54 MMHG | RESPIRATION RATE: 15 BRPM | OXYGEN SATURATION: 98 %

## 2023-06-07 LAB
APPEARANCE UR: CLEAR — SIGNIFICANT CHANGE UP
BACTERIA # UR AUTO: NEGATIVE — SIGNIFICANT CHANGE UP
BILIRUB UR-MCNC: NEGATIVE — SIGNIFICANT CHANGE UP
COLOR SPEC: YELLOW — SIGNIFICANT CHANGE UP
DIFF PNL FLD: ABNORMAL
EPI CELLS # UR: 5 /HPF — SIGNIFICANT CHANGE UP (ref 0–5)
GLUCOSE UR QL: NEGATIVE — SIGNIFICANT CHANGE UP
HYALINE CASTS # UR AUTO: 1 /LPF — SIGNIFICANT CHANGE UP (ref 0–7)
KETONES UR-MCNC: NEGATIVE — SIGNIFICANT CHANGE UP
LEUKOCYTE ESTERASE UR-ACNC: ABNORMAL
NITRITE UR-MCNC: NEGATIVE — SIGNIFICANT CHANGE UP
PH UR: 6.5 — SIGNIFICANT CHANGE UP (ref 5–8)
PROT UR-MCNC: ABNORMAL
RBC CASTS # UR COMP ASSIST: 13 /HPF — HIGH (ref 0–4)
SP GR SPEC: >1.05 (ref 1.01–1.05)
UROBILINOGEN FLD QL: SIGNIFICANT CHANGE UP
WBC UR QL: 11 /HPF — HIGH (ref 0–5)

## 2023-06-07 PROCEDURE — 74177 CT ABD & PELVIS W/CONTRAST: CPT | Mod: 26,MA

## 2023-06-07 NOTE — CONSULT NOTE ADULT - ASSESSMENT
38 yo  who is 5 weeks post-op from rMTCS + BS c/b soft tissue infection involving hysterotomy undergoing outpatient therapy with 7 day course of Augmentin. Patient presents today for repeat labs and imaging to monitor for infection resolution. Patient symptomatically improved with downtrending WBC and CTAP demonstrating partially treated postoperative infection.   - rec extending 7 day course of Augmentin to 10 day course   - outpatient follow-up at clinic on Tuesday,   - no further gyn intervention in ED; clear for discharge     d/w Dr. Clement Romano, PGY-2  38 yo  who is 5 weeks post-op from rMTCS + BS c/b soft tissue infection involving hysterotomy undergoing outpatient therapy with 7 day course of Augmentin. Patient presents today for repeat labs and imaging to monitor for infection resolution. Patient symptomatically improved with downtrending WBC and CTAP demonstrating partially treated postoperative infection.   - rec extending 7 day course of Augmentin to 10 day course   - outpatient follow-up at clinic on Tuesday,   - no further gyn intervention in ED; clear for discharge     d/w Dr. Clement Romano, PGY-2      addendum   Pt afebrile, WBC count trending down.  Pt subjectively reports feeling better since starting antibiotics.  Decision made to extend Augmentin to 10 day course.  Pt advised to f/u in clinic next tuesday.  Fever and pain precautions given, all questions and concerns addressed.     Agnieszka Vaughan MD

## 2023-06-07 NOTE — CONSULT NOTE ADULT - SUBJECTIVE AND OBJECTIVE BOX
GYN Consult Note    37y  who is 5 weeks s/p rMTCS + BS for pregnancy c/b ICP and gHTN who presents to ED today for repeat CTAP and labs. Patient currently being treated for possible infection of surgical site after presenting to [place] ED on  with abdominal pain + subjective fevers/chills and was found to have leukocytosis (16) and imaging demonstrating air and fluid extending from hysterotomy to the surrounding fat and anterior abdominal wall. Patient was counseled on inpatient admission for IV abx but declined so was discharged with a 7 day course of Augmentin 875mg BID x7 days. Patient was seen at [facility] clinic today for follow-up and subsequently sent to ED for repeat imaging and labs to monitor for infection resolution.     In ED today, patient states that her abdominal pain is significantly improved relative to the time when she initially presented to [place] ED. She states that the pain is currently a "mild pain" and located in the midline, approx 4cm superior to skin incision. She denies any subjective fevers or chills, CP/SOB, N/V/D, HA/dizziness/lightheadedness. She reports minimal vaginal bleeding/spotting but denies dysuria or abnormal/foul smelling vaginal discharge. She denies any pain or drainage from the skin incision.     OB/GYN HISTORY:      C/S x3    x3   Most recent pregnancy c/b gHTN and ICP. She underwent unscheduled rMTCS 2/2 worsening SOB in setting of morbid obesity and SARAH. Postpartum course has been uncomplicated aside from the events listed above      Name of GYN Physician: [facility]   Current OCP use: BS    PMH: morbid obesity, SARAH, fatty liver   PSH: C/S x3, LSC evangelina in  or    D&C   Meds: Augmentin 875mg BID, on day 6 or 7   All: NKDA     REVIEW OF SYSTEMS  Constitutional, Cardiovascular, Respiratory, Gastrointestinal, Genitourinary, Musculoskeletal and Integumentary review of systems are normal except as noted. 	        Vital Signs Last 24 Hrs  T(C): 37.2 (2023 00:16), Max: 37.2 (2023 00:16)  T(F): 99 (2023 00:16), Max: 99 (2023 00:16)  HR: 93 (2023 00:16) (79 - 93)  BP: 119/54 (2023 00:16) (119/54 - 141/103)  BP(mean): --  RR: 15 (2023 00:16) (15 - 16)  SpO2: 98% (2023 00:16) (98% - 100%)    Parameters below as of 2023 00:16  Patient On (Oxygen Delivery Method): room air        PHYSICAL EXAM:   Gen: NAD, alert and oriented x 3  Cardiovascular: regular   Respiratory: breathing comfortably on RA  Abd: soft, minimal tenderness to palpation approx 5cm superior to skin incision. non-distended. No rebound or guarding. No epigastric pain. Mild RUQ pain  Incision: well healed, non-erythematous or edematous, No drainage   Pelvic: deferred   Extremities: NTBL  Skin: warm and well perfused      LABS:                        10.6   11.05 )-----------( 387      ( 2023 20:05 )             34.8     06-06    138  |  103  |  11  ----------------------------<  94  4.9   |  19<L>  |  0.55    Ca    9.2      2023 20:05  Mg     2.20     06-06    TPro  7.8  /  Alb  3.9  /  TBili  <0.2  /  DBili  x   /  AST  68<H>  /  ALT  64<H>  /  AlkPhos  138<H>  06-06    PT/INR - ( 2023 20:05 )   PT: 13.3 sec;   INR: 1.14 ratio         PTT - ( 2023 20:05 )  PTT:31.1 sec      RADIOLOGY & ADDITIONAL STUDIES:  < from: CT Abdomen and Pelvis w/ IV Cont (23 @ 01:17) >  PROCEDURE DATE:  2023          INTERPRETATION:  CLINICAL INFORMATION: Abdominal pain. History of    in May.    COMPARISON: CT abdomen and pelvis 2023    CONTRAST/COMPLICATIONS:  IV Contrast: Omnipaque 350  90 cc administered   10 cc discarded  Oral Contrast: NONE  Complications: None reported at time of study completion    PROCEDURE:  CT of the Abdomen and Pelvis was performed.  Sagittal and coronal reformats were performed.    FINDINGS:  LOWER CHEST: Clear    LIVER: Riedel lobe, variant anatomy.  BILE DUCTS: Normal caliber.  GALLBLADDER: Cholecystectomy.  SPLEEN: Within normal limits.  PANCREAS: Within normal limits.  ADRENALS: Within normal limits.  KIDNEYS/URETERS: Within normal limits.    BLADDER: Within normal limits.  REPRODUCTIVE ORGANS: Heterogeneous low density collection in the anterior   lower uterine segment, extends to the uterine periphery with adjacent   periuterine fat stranding and soft tissue thickening that extends to the   urinary bladder. No  BOWEL: No bowel obstruction. Appendix is normal.  PERITONEUM: No free air  VESSELS: Normal caliber  RETROPERITONEUM/LYMPH NODES: Mild retroperitoneal adenopathy, most   prominent in the common iliac chains.  ABDOMINAL WALL: Postsurgical changes. And fat-containing periumbilical   hernia  BONES: Minimal degenerative changes.    IMPRESSION:    1. Heterogeneous low-density collection within the  scar, with   persistent periureteral inflammatory change and soft tissue thickening   extending to the urinary bladder. Findings suggests partially treated   postoperative infection, follow-up imaging to exclude underlying fistula   advised.    --- End of Report ---    < end of copied text >

## 2023-06-08 LAB
CULTURE RESULTS: SIGNIFICANT CHANGE UP
SPECIMEN SOURCE: SIGNIFICANT CHANGE UP

## 2023-06-08 NOTE — REVIEW OF SYSTEMS
[Negative] : Endocrine [Fever] : no fever [Chills] : no chills [Dyspnea] : no dyspnea [Cough] : no cough [Chest Pain] : no chest pain [Palpitations] : no palpitations [Abdominal Pain] : no abdominal pain [Vomiting] : no vomiting [Nausea] : no nausea [Urgency] : no urgency [Frequency] : no frequency

## 2023-06-08 NOTE — PLAN
[FreeTextEntry1] : Patient is a 36yo  now 5 weeks postop from [facility]+ on . Patient seen in [place] ED  for 3 day history of abdominal pain, subjective fevers/chills. Found to have leukocytosis and imaging concerning for soft tissue infection. Pt with some clinical improvement with PO antibiotic regimen.\par \par PLAN:\par - Imaging  reviewed with attending, Dr. Metcalf\par - Recommend re-evaluation in the ED now for repeat imaging and labs.\par - Further antibiotic treatment to be determined after repeat imaging/labs (additional course of PO abx versus IV abx as inpatient)\par - Inpatient GYN resident team informed, ED Resident in charge informed of patient's transfer from clinic to ED by wheelchair\par \par Patient seen at bedside by attending, Dr. Metcalf\par [facility] PGY1

## 2023-06-08 NOTE — HISTORY OF PRESENT ILLNESS
[FreeTextEntry1] : Patient is a 38yo  now 5 weeks postop from [facility]+BS, pregnancy complicated by fatty liver, ICP and gestational hypertension. Pt initially had a repeat  section scheduled, ultimately underwent unscheduled non-urgent  section due to worsening SOB in the setting of morbid obesity and SARAH. Patient was seen for first postop visit in clinic 9 days postop, had staples removed from incision and normal BPs not on antihypertensives. \par \par Postoperative course was uncomplicated until last  when pt passed a large blood clot per vagina at home, followed by onset of abdominal pain and subjective fever/chills. Pt presented to the [place] ED on , labs significant for leukocytosis (16), imaging showing air and fluid extending from c section site of hysterotomy to the surrounding fat and anterior abdominal wall, concerning for infection. Ceftriaxone IV administered in ED and admission recommended; however, pt opted for discharge with PO abx: prescribed Augmentin 875mg BID PO x7 days. Pt states since ED discharge she continued to have fevers until Saturday 6/3. Currently denies fever, chills, severe abdominal pain, nausea, vomiting, incisional drainage, vaginal discharge. Reports headache relieved by Tylenol, denies vision changes, RUQ pain, epigastric pain. Reports continuation of normal lochia.

## 2023-06-08 NOTE — PHYSICAL EXAM
[Chaperone Present] : A chaperone was present in the examining room during all aspects of the physical examination [Appropriately responsive] : appropriately responsive [Alert] : alert [No Acute Distress] : no acute distress [Oriented x3] : oriented x3 [Labia Majora] : normal [Labia Minora] : normal [Scant] : There was scant vaginal bleeding [Normal] : normal [FreeTextEntry4] : extremities well perfused [FreeTextEntry5] : normal respiratory effort on room air [FreeTextEntry7] : soft, uterus firm below umbilicus with 8/10 tenderness to palpation on bimanual exam. Pfannensteil skin incision entirely intact without drainage, no surrounding erythema or induration

## 2023-06-12 LAB
CULTURE RESULTS: SIGNIFICANT CHANGE UP
CULTURE RESULTS: SIGNIFICANT CHANGE UP
SPECIMEN SOURCE: SIGNIFICANT CHANGE UP
SPECIMEN SOURCE: SIGNIFICANT CHANGE UP

## 2023-06-14 ENCOUNTER — APPOINTMENT (OUTPATIENT)
Dept: PULMONOLOGY | Facility: CLINIC | Age: 38
End: 2023-06-14
Payer: MEDICAID

## 2023-06-14 ENCOUNTER — OUTPATIENT (OUTPATIENT)
Dept: OUTPATIENT SERVICES | Facility: HOSPITAL | Age: 38
LOS: 1 days | End: 2023-06-14

## 2023-06-14 ENCOUNTER — APPOINTMENT (OUTPATIENT)
Dept: OBGYN | Facility: HOSPITAL | Age: 38
End: 2023-06-14
Payer: MEDICAID

## 2023-06-14 VITALS
DIASTOLIC BLOOD PRESSURE: 73 MMHG | RESPIRATION RATE: 15 BRPM | HEART RATE: 82 BPM | TEMPERATURE: 97.2 F | OXYGEN SATURATION: 98 % | BODY MASS INDEX: 55.32 KG/M2 | WEIGHT: 293 LBS | SYSTOLIC BLOOD PRESSURE: 105 MMHG | HEIGHT: 61 IN

## 2023-06-14 VITALS
SYSTOLIC BLOOD PRESSURE: 122 MMHG | TEMPERATURE: 98 F | HEIGHT: 61 IN | BODY MASS INDEX: 55.32 KG/M2 | DIASTOLIC BLOOD PRESSURE: 84 MMHG | HEART RATE: 85 BPM | WEIGHT: 293 LBS

## 2023-06-14 DIAGNOSIS — Z98.890 OTHER SPECIFIED POSTPROCEDURAL STATES: Chronic | ICD-10-CM

## 2023-06-14 PROCEDURE — 99213 OFFICE O/P EST LOW 20 MIN: CPT

## 2023-06-14 PROCEDURE — 99213 OFFICE O/P EST LOW 20 MIN: CPT | Mod: GE

## 2023-06-14 NOTE — HISTORY OF PRESENT ILLNESS
[TextBox_4] : 37-year-old woman with a past medical history of sleep apnea not using her CPAP, morbid obesity BMI 55,cholestasis of pregnancy, obesity  who was admitted to the hospital for a  delivery presents posthospital discharge follow-up for pulmonary.\par \par During her hospitalization pulmonary was consulted as she had wheezing, dyspnea. She reports intermittent shortness of breath, worse with pregnancy. She states she has intermittent wheezing especially when she lays on her back. No known official diagnosis of asthma in the past. No other triggers other than lying down to rest. She states she is more dyspneic at night. She states she had been diagnosed with sleep apnea in the past and she received her CPAP machine in 2022. She has Resvent CPAP. She showed me a paper via video from [employer] chat that  AHI was 27.3 - she did not bring documentation to visit today . She states she has not been using it because the mask felt uncomfortable and lately because her daughter had manipulated the controls and she is unable to get it working properly.\par \par She has not been using any inhalers.  She states all wheezing has resolved.  She states she feels she is at her baseline.  She does not have any worsening shortness of breath.  She has not had any wheezing or chest pain.  She is able to ambulate slowly on a flat surface.\par  \par She has a 1-month-old baby and a 4-year-old daughter.  Regarding sleep, she states her sleep is disrupted at this time as she is feeding the baby.  In the past she was told she had sleep apnea.\par  \par She has never smoked.[place] parents, but from [place].  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

## 2023-06-14 NOTE — PHYSICAL EXAM
[No Acute Distress] : no acute distress [Normal Rate/Rhythm] : normal rate/rhythm [Normal S1, S2] : normal s1, s2 [No Resp Distress] : no resp distress [Clear to Auscultation Bilaterally] : clear to auscultation bilaterally [No Edema] : no edema [Normal Color/ Pigmentation] : normal color/ pigmentation [No Focal Deficits] : no focal deficits [TextBox_2] : Obese young woman

## 2023-06-14 NOTE — ASSESSMENT
[FreeTextEntry1] :  37-year-old woman with a past medical history of sleep apnea not using her CPAP, morbid obesity BMI 55,   who was admitted to the hospital for a  delivery presents posthospital discharge follow-up for pulmonary.\par \par During her hospitalization pulmonary was consulted as she had wheezing, dyspnea. She reports intermittent shortness of breath, worse with pregnancy. Possible asthma vs pulmonary edema at the time.  She states all wheezing has resolved since discharge. \par  \par She also has obesity and sleep apnea.  She is nonadherent with CPAP.  She did not use it even while in the hospital.  She has a machine at home but she states she is having difficulty with the settings.\par \par - PFTs\par - sleep medicine referral -request patient to bring in her machine\par -Monitor off inhalers for now.

## 2023-06-15 DIAGNOSIS — T81.40XA INFECTION FOLLOWING A PROCEDURE, UNSPECIFIED, INITIAL ENCOUNTER: ICD-10-CM

## 2023-06-15 DIAGNOSIS — G47.33 OBSTRUCTIVE SLEEP APNEA (ADULT) (PEDIATRIC): ICD-10-CM

## 2023-06-15 DIAGNOSIS — O13.9 GESTATIONAL [PREGNANCY-INDUCED] HYPERTENSION WITHOUT SIGNIFICANT PROTEINURIA, UNSPECIFIED TRIMESTER: ICD-10-CM

## 2023-06-15 LAB — HEMOCCULT STL QL IA: NEGATIVE

## 2023-06-15 NOTE — HISTORY OF PRESENT ILLNESS
[address] [place] [address]  FAMILY MEDICINE RESIDENCY PROGRAM   OFFICE PROGRESS NOTE  DATE OF VISIT : 5/10/23    Patient : Rebecca Sargent    : female   Age : 28 y.o.  : 1987           Chief Complaint :   Chief Complaint   Patient presents with    Check-Up       HPI:   28 y.o. female comes in today for follow up of prediabetes. She is here today with her sister, Ryan Wilson. She is living by herself right now and Rosetta checks on her and helps her with food and iadls/adls since her grandmother passed away in April. Junaid Robbi believes that their grandmother was [place] legal guardian and her payee. Junaid Santiagoabbis has an appointment with social security soon to discuss that she will be Angela's new payee as she is on SSI. Angela's mood has been down with decreased appetite due to her grandmother's death but no s/h ideation. She follows with Roger Domingo. Her next appointment is in July. She admits that she stopped taking the topiramate due to nausea and also the hydroxyzine but did not share this with her psychiatrist or Junaid Culp. Bean Grover admits that she stopped taking the mvi with iron. She is having a soft bm every other day. Review of Systems  Review of Systems   Constitutional:  Negative for chills and fever. HENT:  Negative for congestion. Respiratory:  Negative for cough and shortness of breath. Cardiovascular:  Negative for chest pain, palpitations and leg swelling. Gastrointestinal:  Negative for abdominal pain, nausea and vomiting. Physical Exam:  VS:  Blood pressure 118/71, pulse (!) 106, temperature 97.3 °F (36.3 °C), temperature source Temporal, resp. rate 18, height 5' 7\" (1.702 m), weight 237 lb (107.5 kg), SpO2 98 %. Physical Exam  Constitutional:       Appearance: She is well-developed. HENT:      Head: Normocephalic and atraumatic. Cardiovascular:      Rate and Rhythm: Normal rate and regular rhythm. Heart sounds: No murmur heard.     No [FreeTextEntry1] : 36yo  s/p unscheduled rMTCS+BS on 23 2/2 worsening SOB in the setting of morbid obesity/SARAH. Pregnancy c/b fatty liver, ICP, gHTN, postop course c/b postop abd pain and subjective fevers. She was evaluated at outside hospital and discharged home on Augmentin x2 weeks after receiving 1 dose of Ceftriaxone. At last visit 1 week ago, patient was sent to the ED for repeat labs and CT for interval assessment. Today, patient reports resolution of pain and has not felt fevers or chills since last visit. She is tolerating PO without nausea or vomiting, ambulating without difficulty, voiding spontaneously and having normal bowel movements. She is pumping without issue. She completed her 2-week course of PO abx 2 days ago. \par \par She saw pulmonologist today, and has sleep study scheduled 23. She does not currently use CPAP.

## 2023-06-15 NOTE — PHYSICAL EXAM
[Appropriately responsive] : appropriately responsive [Alert] : alert [No Acute Distress] : no acute distress [No Lymphadenopathy] : no lymphadenopathy [Regular Rate Rhythm] : regular rate rhythm [No Murmurs] : no murmurs [Clear to Auscultation B/L] : clear to auscultation bilaterally [Soft] : soft [Non-tender] : non-tender [Non-distended] : non-distended [Oriented x3] : oriented x3 [FreeTextEntry7] : pfannenstiel incision clean, dry and well-healed. No surrounding erythema, edema or induration.

## 2023-07-03 ENCOUNTER — APPOINTMENT (OUTPATIENT)
Dept: FAMILY MEDICINE | Facility: CLINIC | Age: 38
End: 2023-07-03
Payer: MEDICAID

## 2023-07-03 VITALS
SYSTOLIC BLOOD PRESSURE: 121 MMHG | TEMPERATURE: 97.9 F | DIASTOLIC BLOOD PRESSURE: 80 MMHG | OXYGEN SATURATION: 96 % | HEART RATE: 84 BPM | BODY MASS INDEX: 55.55 KG/M2 | WEIGHT: 293 LBS

## 2023-07-03 DIAGNOSIS — Z98.891 HISTORY OF UTERINE SCAR FROM PREVIOUS SURGERY: ICD-10-CM

## 2023-07-03 DIAGNOSIS — Z92.29 PERSONAL HISTORY OF OTHER DRUG THERAPY: ICD-10-CM

## 2023-07-03 DIAGNOSIS — O28.9 UNSPECIFIED ABNORMAL FINDINGS ON ANTENATAL SCREENING OF MOTHER: ICD-10-CM

## 2023-07-03 DIAGNOSIS — Z87.898 PERSONAL HISTORY OF OTHER SPECIFIED CONDITIONS: ICD-10-CM

## 2023-07-03 DIAGNOSIS — Z11.3 ENCOUNTER FOR SCREENING FOR INFECTIONS WITH A PREDOMINANTLY SEXUAL MODE OF TRANSMISSION: ICD-10-CM

## 2023-07-03 DIAGNOSIS — Z00.00 ENCOUNTER FOR GENERAL ADULT MEDICAL EXAMINATION W/OUT ABNORMAL FINDINGS: ICD-10-CM

## 2023-07-03 DIAGNOSIS — O35.9XX0 MATERNAL CARE FOR (SUSPECTED) FETAL ABNORMALITY AND DAMAGE, UNSPECIFIED, NOT APPLICABLE OR UNSPECIFIED: ICD-10-CM

## 2023-07-03 DIAGNOSIS — L08.9 LOCAL INFECTION OF THE SKIN AND SUBCUTANEOUS TISSUE, UNSPECIFIED: ICD-10-CM

## 2023-07-03 DIAGNOSIS — R10.11 RIGHT UPPER QUADRANT PAIN: ICD-10-CM

## 2023-07-03 DIAGNOSIS — R10.12 RIGHT UPPER QUADRANT PAIN: ICD-10-CM

## 2023-07-03 DIAGNOSIS — R80.9 PROTEINURIA, UNSPECIFIED: ICD-10-CM

## 2023-07-03 DIAGNOSIS — Z86.2 PERSONAL HISTORY OF DISEASES OF THE BLOOD AND BLOOD-FORMING ORGANS AND CERTAIN DISORDERS INVOLVING THE IMMUNE MECHANISM: ICD-10-CM

## 2023-07-03 LAB
BILIRUB UR QL STRIP: NEGATIVE
GLUCOSE UR-MCNC: NEGATIVE
HCG UR QL: 0.2 EU/DL
HGB UR QL STRIP.AUTO: NEGATIVE
KETONES UR-MCNC: NEGATIVE
LEUKOCYTE ESTERASE UR QL STRIP: NEGATIVE
NITRITE UR QL STRIP: NEGATIVE
PH UR STRIP: 6.5
PROT UR STRIP-MCNC: NORMAL
SP GR UR STRIP: 1.02

## 2023-07-03 PROCEDURE — 99395 PREV VISIT EST AGE 18-39: CPT | Mod: 25

## 2023-07-03 PROCEDURE — 81003 URINALYSIS AUTO W/O SCOPE: CPT | Mod: QW

## 2023-07-03 RX ORDER — ACETAMINOPHEN 325 MG/1
325 TABLET ORAL
Refills: 0 | Status: DISCONTINUED | COMMUNITY
Start: 2023-05-05 | End: 2023-07-03

## 2023-07-03 RX ORDER — AMOXICILLIN AND CLAVULANATE POTASSIUM 500; 125 MG/1; 1/1
TABLET, FILM COATED ORAL
Refills: 0 | Status: DISCONTINUED | COMMUNITY
End: 2023-07-03

## 2023-07-03 RX ORDER — METRONIDAZOLE 500 MG/1
500 TABLET ORAL 3 TIMES DAILY
Qty: 42 | Refills: 0 | Status: DISCONTINUED | COMMUNITY
Start: 2023-05-08 | End: 2023-07-03

## 2023-07-03 RX ORDER — PRENATAL VIT NO.130/IRON/FOLIC 27MG-0.8MG
28-0.8 TABLET ORAL
Qty: 30 | Refills: 11 | Status: DISCONTINUED | COMMUNITY
Start: 2022-11-14 | End: 2023-07-03

## 2023-07-03 NOTE — HEALTH RISK ASSESSMENT
[Yes] : Yes [Monthly or less (1 pt)] : Monthly or less (1 point) [1 or 2 (0 pts)] : 1 or 2 (0 points) [Never (0 pts)] : Never (0 points) [No] : In the past 12 months have you used drugs other than those required for medical reasons? No [0] : 2) Feeling down, depressed, or hopeless: Not at all (0) [PHQ-2 Negative - No further assessment needed] : PHQ-2 Negative - No further assessment needed [With Family] : lives with family [Unemployed] : unemployed [] :  [Sexually Active] : sexually active [Never] : Never [de-identified] : walks [de-identified] : tries to keep to a low fat diet [[id number]] : 0 [High Risk Behavior] : no high risk behavior

## 2023-07-03 NOTE — PLAN
[FreeTextEntry1] : 1. CPE\par - routine labs ordered (CBC, CMP, Lipid panel, TSH, A1c)\par - STD screening ordered (urine and blood)\par - UTD with papsmear\par - UTD with Tdap\par - Depression screen with PHQ2 negative\par - UTD with dental and eye exams\par \par 2. Hemorrhoids\par - intermittent blood with wiping when constipated -- Increased hydration, ambulation, and trial of MiraLAX recommended for constipation\par - FOBI was negative \par - Pt to f/u with her gastroenterologist\par - ER instructions provided -- large bloody BM's\par \par 3. Sleep Apnea\par - not current compliant with CPAP\par - Follows with pulmonology -- sleep study was ordered\par \par 4. Obese\par - BMI 55.55\par - c/w low fat diet and regular exercise\par - follows with cardio every 6 months for CAD risk monitoring\par - She will re-establish care with her bariatric surgeon for weight loss surgery as she is now s/p pregnancy and delivery \par \par 5. H. Pylori\par - will start triple therapy in August\par - Management per her gastroenterologist, Dr. Cuello\par \par 6. Hx of Fatty Liver Disease\par - associated with elevated LFT's during pregnancy \par - Weight loss and low fat diet and regular exercise recommended\par - CMP ordered\par - Follows with hepatology, Dr. Hoang\par \par 7. Urinary Frequency\par - on going for a few years\par - Pelvic floor exercises such as Kegels recommended\par -  Urine dip negative for blood, but positive for protein -- pt admits to drinking no water today; recommended increased hydration and will repeat Urine dip at next visit. CMP ordered\par - If urinary frequency persist will refer to urogyn\par \par 3 month f/u

## 2023-07-03 NOTE — HISTORY OF PRESENT ILLNESS
[FreeTextEntry1] : CPE [de-identified] : Patient presents today for a CPE. Feels well today\par \par Vaccinations: UTD Flu, covid19 x3, Tdap 2/27/2023\par \par Last Papsmear: UTD 11/2022\par \par UTD with routine dental and eye exams\par \par Follows with Pulmonology for hx of sleep apnea \par \par Follows with her gastroenterologist HDanniellePylori, dyspepsia. Pt has also established care with hepatology, Dr. Hoang, for hx of elevated LFT's in pregnancy and fatty liver, she has not yet followed up after pregnancy. \par \par Follows with Cardiology. Dr. Sequeira, every 6 months for CAD risk monitoring\par \par Follows with bariatric surgeon for obesity \par \par \par

## 2023-07-03 NOTE — REVIEW OF SYSTEMS
[Constipation] : constipation [Frequency] : frequency [Fever] : no fever [Chills] : no chills [Pain] : no pain [Vision Problems] : no vision problems [Nasal Discharge] : no nasal discharge [Sore Throat] : no sore throat [Chest Pain] : no chest pain [Palpitations] : no palpitations [Lower Ext Edema] : no lower extremity edema [Orthopnea] : no orthopnea [Shortness Of Breath] : no shortness of breath [Wheezing] : no wheezing [Cough] : no cough [Dyspnea on Exertion] : no dyspnea on exertion [Abdominal Pain] : no abdominal pain [Diarrhea] : diarrhea [Vomiting] : no vomiting [Melena] : no melena [Dysuria] : no dysuria [Hematuria] : no hematuria [Muscle Weakness] : no muscle weakness [Muscle Pain] : no muscle pain [Itching] : no itching [Skin Rash] : no skin rash [Headache] : no headache [Dizziness] : no dizziness [Insomnia] : no insomnia [Depression] : no depression [Easy Bleeding] : no easy bleeding [Easy Bruising] : no easy bruising

## 2023-07-05 LAB
25(OH)D3 SERPL-MCNC: 26.8 NG/ML
ALBUMIN SERPL ELPH-MCNC: 4 G/DL
ALP BLD-CCNC: 104 U/L
ALT SERPL-CCNC: 65 U/L
ANION GAP SERPL CALC-SCNC: 10 MMOL/L
AST SERPL-CCNC: 77 U/L
BILIRUB SERPL-MCNC: 0.3 MG/DL
BUN SERPL-MCNC: 11 MG/DL
C TRACH RRNA SPEC QL NAA+PROBE: NOT DETECTED
CALCIUM SERPL-MCNC: 9.1 MG/DL
CHLORIDE SERPL-SCNC: 106 MMOL/L
CHOLEST SERPL-MCNC: 159 MG/DL
CO2 SERPL-SCNC: 25 MMOL/L
CREAT SERPL-MCNC: 0.62 MG/DL
EGFR: 118 ML/MIN/1.73M2
ESTIMATED AVERAGE GLUCOSE: 111 MG/DL
GLUCOSE SERPL-MCNC: 97 MG/DL
HBA1C MFR BLD HPLC: 5.5 %
HBV CORE IGG+IGM SER QL: NONREACTIVE
HCT VFR BLD CALC: 38.3 %
HCV AB SER QL: NONREACTIVE
HCV S/CO RATIO: 0.16 S/CO
HDLC SERPL-MCNC: 39 MG/DL
HGB BLD-MCNC: 11.8 G/DL
HIV1+2 AB SPEC QL IA.RAPID: NONREACTIVE
LDLC SERPL CALC-MCNC: 95 MG/DL
MCHC RBC-ENTMCNC: 26.9 PG
MCHC RBC-ENTMCNC: 30.8 GM/DL
MCV RBC AUTO: 87.2 FL
N GONORRHOEA RRNA SPEC QL NAA+PROBE: NOT DETECTED
NONHDLC SERPL-MCNC: 120 MG/DL
PLATELET # BLD AUTO: 342 K/UL
POTASSIUM SERPL-SCNC: 4.8 MMOL/L
PROT SERPL-MCNC: 7.5 G/DL
RBC # BLD: 4.39 M/UL
RBC # FLD: 14.9 %
SODIUM SERPL-SCNC: 141 MMOL/L
SOURCE AMPLIFICATION: NORMAL
T PALLIDUM AB SER QL IA: NEGATIVE
TRIGL SERPL-MCNC: 123 MG/DL
TSH SERPL-ACNC: 1.53 UIU/ML
WBC # FLD AUTO: 8 K/UL

## 2023-07-13 ENCOUNTER — FORM ENCOUNTER (OUTPATIENT)
Age: 38
End: 2023-07-13

## 2023-07-14 ENCOUNTER — APPOINTMENT (OUTPATIENT)
Dept: SLEEP CENTER | Facility: CLINIC | Age: 38
End: 2023-07-14
Payer: MEDICAID

## 2023-07-14 ENCOUNTER — OUTPATIENT (OUTPATIENT)
Dept: OUTPATIENT SERVICES | Facility: HOSPITAL | Age: 38
LOS: 1 days | End: 2023-07-14
Payer: MEDICAID

## 2023-07-14 DIAGNOSIS — Z98.890 OTHER SPECIFIED POSTPROCEDURAL STATES: Chronic | ICD-10-CM

## 2023-07-14 DIAGNOSIS — Z90.49 ACQUIRED ABSENCE OF OTHER SPECIFIED PARTS OF DIGESTIVE TRACT: Chronic | ICD-10-CM

## 2023-07-14 DIAGNOSIS — Z98.891 HISTORY OF UTERINE SCAR FROM PREVIOUS SURGERY: Chronic | ICD-10-CM

## 2023-07-14 PROCEDURE — 95800 SLP STDY UNATTENDED: CPT | Mod: 26

## 2023-07-14 PROCEDURE — 95800 SLP STDY UNATTENDED: CPT

## 2023-07-25 DIAGNOSIS — G47.33 OBSTRUCTIVE SLEEP APNEA (ADULT) (PEDIATRIC): ICD-10-CM

## 2023-07-27 ENCOUNTER — APPOINTMENT (OUTPATIENT)
Dept: PULMONOLOGY | Facility: CLINIC | Age: 38
End: 2023-07-27
Payer: MEDICAID

## 2023-07-27 VITALS
RESPIRATION RATE: 15 BRPM | BODY MASS INDEX: 55.32 KG/M2 | DIASTOLIC BLOOD PRESSURE: 80 MMHG | HEIGHT: 61 IN | WEIGHT: 293 LBS | SYSTOLIC BLOOD PRESSURE: 119 MMHG | TEMPERATURE: 97.9 F | OXYGEN SATURATION: 96 %

## 2023-07-27 PROCEDURE — 99214 OFFICE O/P EST MOD 30 MIN: CPT

## 2023-07-27 NOTE — PHYSICAL EXAM
[General Appearance - Well Developed] : well developed [Normal Appearance] : normal appearance [General Appearance - Well Nourished] : well nourished [No Deformities] : no deformities [Apical Impulse] : the apical impulse was normal [Heart Rate And Rhythm] : heart rate was normal and rhythm regular [Heart Sounds] : normal S1 and S2 [] : no respiratory distress [Respiration, Rhythm And Depth] : normal respiratory rhythm and effort [Exaggerated Use Of Accessory Muscles For Inspiration] : no accessory muscle use [Abnormal Walk] : normal gait [Involuntary Movements] : no involuntary movements were seen [Skin Color & Pigmentation] : normal skin color and pigmentation [No Focal Deficits] : no focal deficits [Oriented To Time, Place, And Person] : oriented to person, place, and time [Impaired Insight] : insight and judgment were intact [Affect] : the affect was normal [Mood] : the mood was normal

## 2023-07-27 NOTE — ASSESSMENT
[FreeTextEntry1] : 37-year-old female with severe obstructive sleep apnea here to establish care.\par The ramifications of severe SARAH and its potential therapeutic modalities were discussed with the patient. \par She is a resident device at home but is not functioning.\par She does not recall her prior DME.  We will try to place an order for a new CPAP device but I am not sure it would be approved. Follow up after new device is received.

## 2023-07-27 NOTE — HISTORY OF PRESENT ILLNESS
[FreeTextEntry1] : This is a 37 year old female here for evaluation and management of obstructive sleep apnea.\par \par Comorbid medical conditions include class III obesity, fatty liver, history of H. pylori.\par  \par She has a prior diagnosis of moderate obstructive sleep apnea with an AHI of 27.3 documented from Dr. Quiroz's prior note.  She has a ResMed CPAP device that she received in August 2022 but is not functioning and is not using at this time.  She was sent for another sleep study with results as below.\par \par HST (7/14/2023) AHI 59.2/hr, T90 18.2%.\par \par She currently has an almost 3-month-old baby who sleeps from 9 PM to 1 AM.  So she goes to bed around the similar time wakes up to take care of him but additionally she wakes up for 5 times during the night to urinate.\par \par  [Snoring] : snoring [Witnessed Apneas] : witnessed sleep apnea [Frequent Nocturnal Awakening] : frequent nocturnal awakening [Unintentional Sleep while Active] : no unintentional sleep while active [Unintentional Sleep While Inactive] : unintentional sleep while inactive [Daytime Somnolence] : daytime somnolence [ESS] : 8

## 2023-09-06 ENCOUNTER — APPOINTMENT (OUTPATIENT)
Dept: HEPATOLOGY | Facility: CLINIC | Age: 38
End: 2023-09-06
Payer: MEDICAID

## 2023-09-06 ENCOUNTER — NON-APPOINTMENT (OUTPATIENT)
Age: 38
End: 2023-09-06

## 2023-09-06 VITALS
TEMPERATURE: 97.7 F | SYSTOLIC BLOOD PRESSURE: 110 MMHG | OXYGEN SATURATION: 99 % | WEIGHT: 293 LBS | RESPIRATION RATE: 16 BRPM | HEART RATE: 94 BPM | DIASTOLIC BLOOD PRESSURE: 76 MMHG | HEIGHT: 61 IN | BODY MASS INDEX: 55.32 KG/M2

## 2023-09-06 DIAGNOSIS — R74.8 ABNORMAL LEVELS OF OTHER SERUM ENZYMES: ICD-10-CM

## 2023-09-06 PROCEDURE — 99214 OFFICE O/P EST MOD 30 MIN: CPT

## 2023-09-06 RX ORDER — METRONIDAZOLE 500 MG/1
TABLET ORAL
Refills: 0 | Status: DISCONTINUED | COMMUNITY
End: 2023-09-06

## 2023-09-06 NOTE — REVIEW OF SYSTEMS
[Fever] : no fever [Chills] : no chills [Abdominal Pain] : no abdominal pain [Vomiting] : no vomiting [Constipation] : no constipation [Diarrhea] : no diarrhea [Heartburn] : no heartburn [Melena] : no melena [Dysuria] : no dysuria [Arthralgias] : no arthralgias [Confused] : no confusion [Negative] : Heme/Lymph [FreeTextEntry7] : No nausea. No hematochezia. [de-identified] : Itching improved on Ursodiol.

## 2023-09-06 NOTE — HISTORY OF PRESENT ILLNESS
[IV Drug Use] : no IV drug use [Tattoo] : no tattoos [Hemodialysis] : no hemodialysis [Transfusion before 1992] : no transfusion before 1992 [Transplant before 1992] : no transplant before 1992 [Alcohol Abuse] : no alcohol abuse [Autoimmune Disorder] : no autoimmune disorder [Travel to Endemic Area] : no travel to an endemic area [Occupational Exposure] : no occupational exposure [Cocaine Use] : no cocaine use [de-identified] : Hx of social alcohol (only at occasions, 1-2x a year). Cousin possibly had Hep B requiring liver transplant.   Born in [place].  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 Castañeda) for elevated liver enzymes in hepatocellular pattern, noted since at least 11/2022. She was found to have mildly elevated bile acids (12), and been treated with Ursodiol since beginning of this year, with improvement of her itching, but without improvement of her liver enzymes.  RUQ US 12/8/2022, but was not complete US and no Doppler.\par  No complications during prior pregnancies, first pregnancy 2003, followed by 2006, 2011, 2013, 2019.      \par  She does c/o acid reflux, and reports "lot of gas". \par

## 2023-09-06 NOTE — ASSESSMENT
[FreeTextEntry1] : 38 yo morbidly obese Female, currently 33 weeks pregnant (LMP 8/6/2022) with 6th child, with Hx of sleep apnea (has, but not using CPAP), Hx of Helicobacter (s/p failed treatment), was seen by GI (Dr. Castañeda) for elevated liver enzymes in hepatocellular pattern, noted since at least 11/2022. She was found to have mildly elevated bile acids (12), and been treated with Ursodiol since beginning of this year, with improvement of her itching, but without improvement of her liver enzymes.\par  \par  Intrahepatic cholestasis of pregnancy\par  - C/w Ursodiol, states that has refill\par  - Discussed risks, as well as risks and benefits of Ursodiol. Dose can be increased, as she is getting < 13 mg/bwkg/day, would increase to 500 mg bid and if tolerates, could consider further adjust. \par  - Timing of delivery per Ob/Gyn\par  \par  Elevated liver enzymes\par  - ICP might contribute, but it seems that her liver enzymes have been elevated from the beginning of pregnancy, thus likely she has underlying chronic liver disease as well, that contributes to her abnormal liver tests, eg. NAFLD/HERNANDEZ.\par  - Unable to do Fibroscan or MR elastography during her pregnancy\par  - Will get complete US abd w/ Doppler to assess spleen size and hepatic vasculature as well\par  - Will get LFTs and preeclampsia labs\par  - Denies prior pregnancy issues.\par  - Currently BP acceptable, denies complaints. PLT, Hb WNL. At present no suspicion for HELLP or AFLP, but will need close monitoring, \par  - Follow up w/ Gyn. Reports having in few days. \par  - Discussed that after delivery further workup will be needed too.\par  - Discussed when to seek immediate medical attention.\par  \par  RTC 2 weeks\par  \par  \par

## 2023-09-14 ENCOUNTER — APPOINTMENT (OUTPATIENT)
Dept: PULMONOLOGY | Facility: CLINIC | Age: 38
End: 2023-09-14
Payer: MEDICAID

## 2023-09-14 VITALS
DIASTOLIC BLOOD PRESSURE: 80 MMHG | WEIGHT: 293 LBS | SYSTOLIC BLOOD PRESSURE: 134 MMHG | BODY MASS INDEX: 55.32 KG/M2 | HEART RATE: 84 BPM | HEIGHT: 61 IN

## 2023-09-14 PROCEDURE — 94060 EVALUATION OF WHEEZING: CPT

## 2023-09-14 PROCEDURE — 94726 PLETHYSMOGRAPHY LUNG VOLUMES: CPT

## 2023-09-14 PROCEDURE — 94729 DIFFUSING CAPACITY: CPT

## 2023-09-14 PROCEDURE — 99213 OFFICE O/P EST LOW 20 MIN: CPT | Mod: 25

## 2023-09-14 PROCEDURE — ZZZZZ: CPT

## 2023-09-25 ENCOUNTER — TRANSCRIPTION ENCOUNTER (OUTPATIENT)
Age: 38
End: 2023-09-25

## 2023-09-25 PROBLEM — R74.8 ELEVATED LIVER ENZYMES: Status: ACTIVE | Noted: 2022-12-01

## 2023-09-25 LAB
ALBUMIN SERPL ELPH-MCNC: 4.2 G/DL
ALP BLD-CCNC: 96 U/L
ALT SERPL-CCNC: 31 U/L
ANION GAP SERPL CALC-SCNC: 13 MMOL/L
APTT BLD: 33 SEC
AST SERPL W P-5'-P-CCNC: 29 IU/L
AST SERPL-CCNC: 30 U/L
BILE AC SER-MCNC: 5.3 UMOL/L
BILIRUB DIRECT SERPL-MCNC: 0.1 MG/DL
BILIRUB INDIRECT SERPL-MCNC: 0.2 MG/DL
BILIRUB SERPL-MCNC: 0.3 MG/DL
BUN SERPL-MCNC: 12 MG/DL
CALCIUM SERPL-MCNC: 9.1 MG/DL
CHLORIDE SERPL-SCNC: 104 MMOL/L
CHOLEST SERPL-MCNC: 150 MG/DL
CO2 SERPL-SCNC: 22 MMOL/L
COMMENT:: NORMAL
CREAT SERPL-MCNC: 0.59 MG/DL
DEPRECATED KAPPA LC FREE/LAMBDA SER: 1.66 RATIO
EGFR: 119 ML/MIN/1.73M2
FIBROSIS STAGE SERPL QL: NORMAL
FIBROSURE ALPHA 2 MACROGLOBULINS: 164 MG/DL
FIBROSURE ALT (SGPT): 31 IU/L
FIBROSURE APOLIPOPROTEIN A1: 116 MG/DL
FIBROSURE GGT: 21 IU/L
FIBROSURE HAPTOGLOBIN: 167 MG/DL
FIBROSURE SCORING: NORMAL
FIBROSURE TOTAL BILIRUBIN: 0.2 MG/DL
GLUCOSE SERPL-MCNC: 85 MG/DL
GLUCOSE SERPL-MCNC: 87 MG/DL
HCT VFR BLD CALC: 37.2 %
HGB BLD-MCNC: 11.2 G/DL
IGA SER QL IEP: 288 MG/DL
IGG SER QL IEP: 1529 MG/DL
IGM SER QL IEP: 113 MG/DL
INR PPP: 0.99 RATIO
INSULIN P FAST SERPL-ACNC: 24.9 UU/ML
INTERPRETATIONS:: NORMAL
KAPPA LC CSF-MCNC: 2.48 MG/DL
KAPPA LC SERPL-MCNC: 4.12 MG/DL
LIVER FIBR SCORE SERPL CALC.FIBROSURE: 0.05
Lab: NORMAL
MCHC RBC-ENTMCNC: 24.6 PG
MCHC RBC-ENTMCNC: 30.1 GM/DL
MCV RBC AUTO: 81.6 FL
NASH SCORING: NORMAL
NECROINFLAMMATORY ACT GRADE SERPL QL: NORMAL
NECROINFLAMMATORY ACT SCORE SERPL: 0.27
PLATELET # BLD AUTO: 333 K/UL
POTASSIUM SERPL-SCNC: 4.5 MMOL/L
PROT SERPL-MCNC: 7.1 G/DL
PT BLD: 11.2 SEC
RBC # BLD: 4.56 M/UL
RBC # FLD: 16.3 %
SERVICE CMNT-IMP: NORMAL
SODIUM SERPL-SCNC: 140 MMOL/L
STEATOSIS GRADE: NORMAL
STEATOSIS SCORE: 0.41
STEATOSIS SCORING: NORMAL
TRIGL SERPL-MCNC: 111 MG/DL
WBC # FLD AUTO: 8.52 K/UL

## 2023-10-03 ENCOUNTER — APPOINTMENT (OUTPATIENT)
Dept: MRI IMAGING | Facility: IMAGING CENTER | Age: 38
End: 2023-10-03
Payer: MEDICAID

## 2023-10-03 ENCOUNTER — APPOINTMENT (OUTPATIENT)
Dept: FAMILY MEDICINE | Facility: CLINIC | Age: 38
End: 2023-10-03
Payer: MEDICAID

## 2023-10-03 ENCOUNTER — RESULT REVIEW (OUTPATIENT)
Age: 38
End: 2023-10-03

## 2023-10-03 ENCOUNTER — OUTPATIENT (OUTPATIENT)
Dept: OUTPATIENT SERVICES | Facility: HOSPITAL | Age: 38
LOS: 1 days | End: 2023-10-03
Payer: MEDICAID

## 2023-10-03 VITALS
SYSTOLIC BLOOD PRESSURE: 106 MMHG | WEIGHT: 293 LBS | OXYGEN SATURATION: 96 % | DIASTOLIC BLOOD PRESSURE: 76 MMHG | BODY MASS INDEX: 55.32 KG/M2 | HEIGHT: 61 IN | HEART RATE: 89 BPM | TEMPERATURE: 98.2 F

## 2023-10-03 DIAGNOSIS — B37.89 OTHER SITES OF CANDIDIASIS: ICD-10-CM

## 2023-10-03 DIAGNOSIS — R79.89 OTHER SPECIFIED ABNORMAL FINDINGS OF BLOOD CHEMISTRY: ICD-10-CM

## 2023-10-03 DIAGNOSIS — Z00.8 ENCOUNTER FOR OTHER GENERAL EXAMINATION: ICD-10-CM

## 2023-10-03 DIAGNOSIS — Z98.890 OTHER SPECIFIED POSTPROCEDURAL STATES: Chronic | ICD-10-CM

## 2023-10-03 DIAGNOSIS — Z98.891 HISTORY OF UTERINE SCAR FROM PREVIOUS SURGERY: Chronic | ICD-10-CM

## 2023-10-03 DIAGNOSIS — Z87.448 PERSONAL HISTORY OF OTHER DISEASES OF URINARY SYSTEM: ICD-10-CM

## 2023-10-03 DIAGNOSIS — K76.0 FATTY (CHANGE OF) LIVER, NOT ELSEWHERE CLASSIFIED: ICD-10-CM

## 2023-10-03 DIAGNOSIS — E66.01 MORBID (SEVERE) OBESITY DUE TO EXCESS CALORIES: ICD-10-CM

## 2023-10-03 DIAGNOSIS — Z87.19 PERSONAL HISTORY OF OTHER DISEASES OF THE DIGESTIVE SYSTEM: ICD-10-CM

## 2023-10-03 DIAGNOSIS — Z90.49 ACQUIRED ABSENCE OF OTHER SPECIFIED PARTS OF DIGESTIVE TRACT: Chronic | ICD-10-CM

## 2023-10-03 DIAGNOSIS — Z23 ENCOUNTER FOR IMMUNIZATION: ICD-10-CM

## 2023-10-03 DIAGNOSIS — G56.00 CARPAL TUNNEL SYNDROME, UNSPECIFIED UPPER LIMB: ICD-10-CM

## 2023-10-03 DIAGNOSIS — K62.5 HEMORRHAGE OF ANUS AND RECTUM: ICD-10-CM

## 2023-10-03 PROCEDURE — A9585: CPT

## 2023-10-03 PROCEDURE — 74183 MRI ABD W/O CNTR FLWD CNTR: CPT | Mod: 26

## 2023-10-03 PROCEDURE — 76391 MR ELASTOGRAPHY: CPT

## 2023-10-03 PROCEDURE — G0008: CPT

## 2023-10-03 PROCEDURE — 90686 IIV4 VACC NO PRSV 0.5 ML IM: CPT

## 2023-10-03 PROCEDURE — 74183 MRI ABD W/O CNTR FLWD CNTR: CPT

## 2023-10-03 PROCEDURE — 76391 MR ELASTOGRAPHY: CPT | Mod: 26

## 2023-10-03 PROCEDURE — 99214 OFFICE O/P EST MOD 30 MIN: CPT | Mod: 25

## 2023-10-03 RX ORDER — BISMUTH SUBSALICYLATE 262 MG
262 TABLET,CHEWABLE ORAL 4 TIMES DAILY
Qty: 112 | Refills: 0 | Status: DISCONTINUED | COMMUNITY
Start: 2023-05-08 | End: 2023-10-03

## 2023-10-03 RX ORDER — OMEPRAZOLE 40 MG/1
40 CAPSULE, DELAYED RELEASE ORAL TWICE DAILY
Qty: 28 | Refills: 0 | Status: DISCONTINUED | COMMUNITY
Start: 2023-05-08 | End: 2023-10-03

## 2023-10-11 ENCOUNTER — APPOINTMENT (OUTPATIENT)
Dept: HEPATOLOGY | Facility: CLINIC | Age: 38
End: 2023-10-11
Payer: MEDICAID

## 2023-10-11 VITALS — SYSTOLIC BLOOD PRESSURE: 110 MMHG | DIASTOLIC BLOOD PRESSURE: 80 MMHG

## 2023-10-11 VITALS
HEART RATE: 74 BPM | HEIGHT: 61 IN | BODY MASS INDEX: 55.32 KG/M2 | RESPIRATION RATE: 16 BRPM | TEMPERATURE: 97.4 F | WEIGHT: 293 LBS | OXYGEN SATURATION: 97 %

## 2023-10-11 DIAGNOSIS — R16.0 HEPATOMEGALY, NOT ELSEWHERE CLASSIFIED: ICD-10-CM

## 2023-10-11 PROCEDURE — 99214 OFFICE O/P EST MOD 30 MIN: CPT

## 2023-10-19 ENCOUNTER — APPOINTMENT (OUTPATIENT)
Dept: PULMONOLOGY | Facility: CLINIC | Age: 38
End: 2023-10-19

## 2023-10-27 ENCOUNTER — APPOINTMENT (OUTPATIENT)
Age: 38
End: 2023-10-27
Payer: MEDICAID

## 2023-10-27 VITALS
TEMPERATURE: 98.1 F | HEIGHT: 61.02 IN | SYSTOLIC BLOOD PRESSURE: 140 MMHG | HEART RATE: 76 BPM | DIASTOLIC BLOOD PRESSURE: 81 MMHG | BODY MASS INDEX: 55.32 KG/M2 | WEIGHT: 293 LBS | OXYGEN SATURATION: 97 %

## 2023-10-27 DIAGNOSIS — Z83.3 FAMILY HISTORY OF DIABETES MELLITUS: ICD-10-CM

## 2023-10-27 DIAGNOSIS — Z56.0 UNEMPLOYMENT, UNSPECIFIED: ICD-10-CM

## 2023-10-27 PROCEDURE — 99204 OFFICE O/P NEW MOD 45 MIN: CPT

## 2023-10-27 RX ORDER — SEMAGLUTIDE 0.25 MG/.5ML
0.25 INJECTION, SOLUTION SUBCUTANEOUS
Qty: 1 | Refills: 0 | Status: COMPLETED | COMMUNITY
Start: 2023-10-11 | End: 2023-10-27

## 2023-10-27 SDOH — ECONOMIC STABILITY - INCOME SECURITY: UNEMPLOYMENT, UNSPECIFIED: Z56.0

## 2023-10-30 LAB
25(OH)D3 SERPL-MCNC: 20.1 NG/ML
APTT BLD: 33.6 SEC
CALCIUM SERPL-MCNC: 9.4 MG/DL
FERRITIN SERPL-MCNC: 22 NG/ML
FOLATE SERPL-MCNC: 7.2 NG/ML
IRON SATN MFR SERPL: 8 %
IRON SERPL-MCNC: 34 UG/DL
PARATHYROID HORMONE INTACT: 30 PG/ML
TIBC SERPL-MCNC: 435 UG/DL
UIBC SERPL-MCNC: 401 UG/DL
VIT B12 SERPL-MCNC: 530 PG/ML

## 2023-10-30 RX ORDER — CHOLECALCIFEROL (VITAMIN D3) 25 MCG
25 MCG TABLET ORAL
Qty: 30 | Refills: 6 | Status: COMPLETED | COMMUNITY
Start: 2023-02-13 | End: 2023-10-30

## 2023-11-02 LAB — VIT B1 SERPL-MCNC: 152.2 NMOL/L

## 2023-11-06 ENCOUNTER — APPOINTMENT (OUTPATIENT)
Dept: GASTROENTEROLOGY | Facility: CLINIC | Age: 38
End: 2023-11-06
Payer: MEDICAID

## 2023-11-06 VITALS
SYSTOLIC BLOOD PRESSURE: 142 MMHG | HEART RATE: 78 BPM | DIASTOLIC BLOOD PRESSURE: 89 MMHG | WEIGHT: 293 LBS | TEMPERATURE: 97.6 F | OXYGEN SATURATION: 97 % | BODY MASS INDEX: 55.32 KG/M2 | HEIGHT: 61.02 IN

## 2023-11-06 DIAGNOSIS — K59.00 CONSTIPATION, UNSPECIFIED: ICD-10-CM

## 2023-11-06 PROCEDURE — 99214 OFFICE O/P EST MOD 30 MIN: CPT

## 2023-11-08 ENCOUNTER — NON-APPOINTMENT (OUTPATIENT)
Age: 38
End: 2023-11-08

## 2023-11-08 LAB — UREA BREATH TEST QL: NEGATIVE

## 2023-11-29 ENCOUNTER — APPOINTMENT (OUTPATIENT)
Dept: SURGERY | Facility: CLINIC | Age: 38
End: 2023-11-29
Payer: MEDICAID

## 2023-11-29 VITALS
OXYGEN SATURATION: 98 % | HEART RATE: 70 BPM | DIASTOLIC BLOOD PRESSURE: 88 MMHG | SYSTOLIC BLOOD PRESSURE: 129 MMHG | HEIGHT: 61 IN | BODY MASS INDEX: 55.32 KG/M2 | WEIGHT: 293 LBS | RESPIRATION RATE: 16 BRPM

## 2023-11-29 PROCEDURE — 99213 OFFICE O/P EST LOW 20 MIN: CPT

## 2023-12-04 ENCOUNTER — NON-APPOINTMENT (OUTPATIENT)
Age: 38
End: 2023-12-04

## 2023-12-04 ENCOUNTER — APPOINTMENT (OUTPATIENT)
Dept: CARDIOLOGY | Facility: CLINIC | Age: 38
End: 2023-12-04
Payer: MEDICAID

## 2023-12-04 VITALS
HEIGHT: 61 IN | WEIGHT: 293 LBS | HEART RATE: 98 BPM | TEMPERATURE: 97.1 F | OXYGEN SATURATION: 96 % | SYSTOLIC BLOOD PRESSURE: 116 MMHG | BODY MASS INDEX: 55.32 KG/M2 | DIASTOLIC BLOOD PRESSURE: 78 MMHG

## 2023-12-04 DIAGNOSIS — Z13.6 ENCOUNTER FOR SCREENING FOR CARDIOVASCULAR DISORDERS: ICD-10-CM

## 2023-12-04 PROCEDURE — 93000 ELECTROCARDIOGRAM COMPLETE: CPT

## 2023-12-04 PROCEDURE — 99214 OFFICE O/P EST MOD 30 MIN: CPT | Mod: 25

## 2023-12-07 PROBLEM — Z13.6 SCREENING FOR CARDIOVASCULAR CONDITION: Status: ACTIVE | Noted: 2022-01-10

## 2023-12-08 ENCOUNTER — APPOINTMENT (OUTPATIENT)
Dept: PULMONOLOGY | Facility: CLINIC | Age: 38
End: 2023-12-08
Payer: MEDICAID

## 2023-12-08 VITALS
TEMPERATURE: 98.2 F | RESPIRATION RATE: 16 BRPM | DIASTOLIC BLOOD PRESSURE: 84 MMHG | OXYGEN SATURATION: 98 % | HEIGHT: 61 IN | WEIGHT: 293 LBS | SYSTOLIC BLOOD PRESSURE: 121 MMHG | BODY MASS INDEX: 55.32 KG/M2 | HEART RATE: 96 BPM

## 2023-12-08 DIAGNOSIS — K21.9 GASTRO-ESOPHAGEAL REFLUX DISEASE W/OUT ESOPHAGITIS: ICD-10-CM

## 2023-12-08 PROCEDURE — 99203 OFFICE O/P NEW LOW 30 MIN: CPT

## 2023-12-09 PROBLEM — K21.9 GERD (GASTROESOPHAGEAL REFLUX DISEASE): Status: ACTIVE | Noted: 2023-01-17

## 2023-12-13 ENCOUNTER — OUTPATIENT (OUTPATIENT)
Dept: OUTPATIENT SERVICES | Facility: HOSPITAL | Age: 38
LOS: 1 days | End: 2023-12-13
Payer: MEDICAID

## 2023-12-13 DIAGNOSIS — Z98.890 OTHER SPECIFIED POSTPROCEDURAL STATES: Chronic | ICD-10-CM

## 2023-12-13 DIAGNOSIS — Z98.891 HISTORY OF UTERINE SCAR FROM PREVIOUS SURGERY: Chronic | ICD-10-CM

## 2023-12-13 DIAGNOSIS — R06.09 OTHER FORMS OF DYSPNEA: ICD-10-CM

## 2023-12-13 DIAGNOSIS — Z90.49 ACQUIRED ABSENCE OF OTHER SPECIFIED PARTS OF DIGESTIVE TRACT: Chronic | ICD-10-CM

## 2023-12-13 PROCEDURE — 93016 CV STRESS TEST SUPVJ ONLY: CPT

## 2023-12-13 PROCEDURE — 93017 CV STRESS TEST TRACING ONLY: CPT

## 2023-12-13 PROCEDURE — 93018 CV STRESS TEST I&R ONLY: CPT

## 2023-12-15 ENCOUNTER — APPOINTMENT (OUTPATIENT)
Dept: HEPATOLOGY | Facility: CLINIC | Age: 38
End: 2023-12-15

## 2023-12-19 LAB
ALBUMIN MFR SERPL ELPH: 51.2 %
ALBUMIN SERPL ELPH-MCNC: 4.2 G/DL
ALBUMIN SERPL-MCNC: 3.6 G/DL
ALBUMIN/GLOB SERPL: 1.1 RATIO
ALBUPE: 36.2 %
ALP BLD-CCNC: 97 U/L
ALPHA1 GLOB MFR SERPL ELPH: 4.3 %
ALPHA1 GLOB SERPL ELPH-MCNC: 0.3 G/DL
ALPHA1UPE: 24 %
ALPHA2 GLOB MFR SERPL ELPH: 10.3 %
ALPHA2 GLOB SERPL ELPH-MCNC: 0.7 G/DL
ALPHA2UPE: 16.9 %
ALT SERPL-CCNC: 26 U/L
ANION GAP SERPL CALC-SCNC: 9 MMOL/L
AST SERPL-CCNC: 22 U/L
B-GLOBULIN MFR SERPL ELPH: 14 %
B-GLOBULIN SERPL ELPH-MCNC: 1 G/DL
BETAUPE: 14.8 %
BILIRUB DIRECT SERPL-MCNC: 0.1 MG/DL
BILIRUB INDIRECT SERPL-MCNC: 0.2 MG/DL
BILIRUB SERPL-MCNC: 0.2 MG/DL
BUN SERPL-MCNC: 12 MG/DL
CALCIUM SERPL-MCNC: 9 MG/DL
CHLORIDE SERPL-SCNC: 105 MMOL/L
CO2 SERPL-SCNC: 26 MMOL/L
CREAT SERPL-MCNC: 0.61 MG/DL
DEPRECATED KAPPA LC FREE/LAMBDA SER: 1.51 RATIO
EGFR: 117 ML/MIN/1.73M2
ESTIMATED AVERAGE GLUCOSE: 117 MG/DL
GAMMA GLOB FLD ELPH-MCNC: 1.4 G/DL
GAMMA GLOB MFR SERPL ELPH: 20.2 %
GAMMAUPE: 8.1 %
GLUCOSE SERPL-MCNC: 94 MG/DL
HBA1C MFR BLD HPLC: 5.7 %
HCT VFR BLD CALC: 36.5 %
HGB BLD-MCNC: 11 G/DL
IGA 24H UR QL IFE: NORMAL
IGA SER QL IEP: 314 MG/DL
IGG SER QL IEP: 1532 MG/DL
IGM SER QL IEP: 120 MG/DL
INR PPP: 0.97 RATIO
INTERPRETATION SERPL IEP-IMP: NORMAL
KAPPA LC 24H UR QL: NORMAL
KAPPA LC CSF-MCNC: 2.41 MG/DL
KAPPA LC SERPL-MCNC: 3.63 MG/DL
MCHC RBC-ENTMCNC: 23.5 PG
MCHC RBC-ENTMCNC: 30.1 GM/DL
MCV RBC AUTO: 77.8 FL
PLATELET # BLD AUTO: 383 K/UL
POTASSIUM SERPL-SCNC: 4.6 MMOL/L
PROT PATTERN 24H UR ELPH-IMP: NORMAL
PROT SERPL-MCNC: 7 G/DL
PROT SERPL-MCNC: 7 G/DL
PROT SERPL-MCNC: 7.5 G/DL
PROT UR-MCNC: 27 MG/DL
PROT UR-MCNC: 27 MG/DL
PT BLD: 11 SEC
RBC # BLD: 4.69 M/UL
RBC # FLD: 17.8 %
SODIUM SERPL-SCNC: 141 MMOL/L
WBC # FLD AUTO: 8.28 K/UL

## 2023-12-26 DIAGNOSIS — O34.219 MATERNAL CARE FOR UNSPECIFIED TYPE SCAR FROM PREVIOUS CESAREAN DELIVERY: ICD-10-CM

## 2023-12-26 DIAGNOSIS — Z34.92 ENCOUNTER FOR SUPERVISION OF NORMAL PREGNANCY, UNSPECIFIED, SECOND TRIMESTER: ICD-10-CM

## 2023-12-26 DIAGNOSIS — K83.1 LIVER AND BILIARY TRACT DISORDERS IN PREGNANCY, SECOND TRIMESTER: ICD-10-CM

## 2023-12-26 DIAGNOSIS — K83.1 LIVER AND BILIARY TRACT DISORDERS IN PREGNANCY, THIRD TRIMESTER: ICD-10-CM

## 2023-12-26 DIAGNOSIS — O26.613 LIVER AND BILIARY TRACT DISORDERS IN PREGNANCY, THIRD TRIMESTER: ICD-10-CM

## 2023-12-26 DIAGNOSIS — Z3A.21 21 WEEKS GESTATION OF PREGNANCY: ICD-10-CM

## 2023-12-26 DIAGNOSIS — O09.40 SUPERVISION OF PREGNANCY WITH GRAND MULTIPARITY, UNSPECIFIED TRIMESTER: ICD-10-CM

## 2023-12-26 DIAGNOSIS — O26.612 LIVER AND BILIARY TRACT DISORDERS IN PREGNANCY, SECOND TRIMESTER: ICD-10-CM

## 2023-12-26 DIAGNOSIS — O09.93 SUPERVISION OF HIGH RISK PREGNANCY, UNSPECIFIED, THIRD TRIMESTER: ICD-10-CM

## 2023-12-26 DIAGNOSIS — Z01.812 ENCOUNTER FOR PREPROCEDURAL LABORATORY EXAMINATION: ICD-10-CM

## 2023-12-26 DIAGNOSIS — Z3A.31 31 WEEKS GESTATION OF PREGNANCY: ICD-10-CM

## 2023-12-26 DIAGNOSIS — O09.522 SUPERVISION OF ELDERLY MULTIGRAVIDA, SECOND TRIMESTER: ICD-10-CM

## 2023-12-26 DIAGNOSIS — Z01.810 ENCOUNTER FOR PREPROCEDURAL CARDIOVASCULAR EXAMINATION: ICD-10-CM

## 2023-12-26 DIAGNOSIS — Z3A.26 26 WEEKS GESTATION OF PREGNANCY: ICD-10-CM

## 2023-12-27 ENCOUNTER — APPOINTMENT (OUTPATIENT)
Dept: SURGERY | Facility: CLINIC | Age: 38
End: 2023-12-27
Payer: MEDICAID

## 2023-12-27 VITALS
SYSTOLIC BLOOD PRESSURE: 111 MMHG | HEIGHT: 61 IN | BODY MASS INDEX: 55.32 KG/M2 | WEIGHT: 293 LBS | OXYGEN SATURATION: 97 % | DIASTOLIC BLOOD PRESSURE: 72 MMHG | HEART RATE: 87 BPM

## 2023-12-27 PROCEDURE — 99213 OFFICE O/P EST LOW 20 MIN: CPT

## 2023-12-27 NOTE — REASON FOR VISIT
[Follow-Up Visit] : a follow-up visit for [Morbid Obesity (BMI>40)] : morbid obesity (bmi>40) [FreeTextEntry2] : For Monthly weigh ins

## 2023-12-27 NOTE — PHYSICAL EXAM
[Obese] : obese [Normal] : affect appropriate [de-identified] : Normoactive bowel sounds, soft and nontender, no hepatosplenomegaly or masses noted

## 2023-12-27 NOTE — ASSESSMENT
[FreeTextEntry1] : ESTUARDO AGUERO is a 38 year old female with morbid obesity (BMI 56.58  kg/m2) who present in the office for pre-operative follow-up and weight management program in preparation for bariatric surgery likely Robotic assisted Laparoscopic Sleeve Gastrectomy.   Patient is here for monthly weigh ins. Today's weight is 300 lb and BMI 56.58 kg/m2, patient is doing well, offers no complaints.   Today is third month of weigh ins   Pending Clearances: Pulmonary,  CPAP use Cardiologist, pending follow up for clearance letter. Registered Dietitian,  PCP Letter of Support

## 2023-12-27 NOTE — HISTORY OF PRESENT ILLNESS
[de-identified] : ESTUARDO AGUERO is a 38 year old female with morbid obesity (BMI 56.58 kg/m2) who present in the office for pre-operative follow-up and weight management program in preparation for likely Robotic assisted Laparoscopic Sleeve Gastrectomy.  Patient is here for monthly weigh ins. Today's weight is 300 lb and BMI 56.68 kg/m2. Patient lost 2 lb since last visit.  -Labs: on vitamin D and iron, we will recheck labs next month -Nutrition: pending clearances   -Cardiology: pending clearance, need a follow up visit post testing.  -Pulmonary: has a history of severe SARAH. CPAP machine.  -GI: Had an EGD on 9/6/22 results c/w normal esophagus, irregular Z-line at 40 cm from the incisors, gastroesophageal flap valve classified as Hill grade 2 (fold present, opens with respiration), erythematous mucosa in the stomach, mucosal nodule found in the duodenum likely Brunner's gland and normal examined duodenum. H. pylori eradicated  11/06/2023 H. Pylori Urea Breath Test is negative  -Psych: Cleared -PCP Letter: pending letter of support

## 2023-12-27 NOTE — CONSULT LETTER
[Dear  ___] : Dear  [unfilled], [Courtesy Letter:] : I had the pleasure of seeing your patient, [unfilled], in my office today. [Consult Closing:] : Thank you very much for allowing me to participate in the care of this patient.  If you have any questions, please do not hesitate to contact me. [Sincerely,] : Sincerely, [FreeTextEntry3] : Lara Correia MD FACS

## 2024-01-19 ENCOUNTER — APPOINTMENT (OUTPATIENT)
Dept: HEPATOLOGY | Facility: CLINIC | Age: 39
End: 2024-01-19
Payer: MEDICAID

## 2024-01-19 VITALS
WEIGHT: 293 LBS | BODY MASS INDEX: 55.32 KG/M2 | TEMPERATURE: 98.4 F | RESPIRATION RATE: 16 BRPM | DIASTOLIC BLOOD PRESSURE: 87 MMHG | SYSTOLIC BLOOD PRESSURE: 140 MMHG | HEIGHT: 61 IN | HEART RATE: 96 BPM | OXYGEN SATURATION: 98 %

## 2024-01-19 DIAGNOSIS — R76.8 OTHER SPECIFIED ABNORMAL IMMUNOLOGICAL FINDINGS IN SERUM: ICD-10-CM

## 2024-01-19 PROCEDURE — 99214 OFFICE O/P EST MOD 30 MIN: CPT

## 2024-01-22 PROBLEM — R76.8 ELEVATED SERUM IMMUNOGLOBULIN FREE LIGHT CHAINS: Status: ACTIVE | Noted: 2023-10-11

## 2024-01-22 NOTE — REVIEW OF SYSTEMS
[Negative] : Heme/Lymph [Fever] : no fever [Chills] : no chills [Abdominal Pain] : no abdominal pain [Vomiting] : no vomiting [Constipation] : no constipation [Diarrhea] : no diarrhea [Heartburn] : no heartburn [Melena] : no melena [Dysuria] : no dysuria [Arthralgias] : no arthralgias [Itching] : no itching [Confused] : no confusion [FreeTextEntry7] : No nausea. No hematochezia.

## 2024-01-22 NOTE — REASON FOR VISIT
[Follow-Up: _____] : a [unfilled] follow-up visit [Family Member] : family member [FreeTextEntry1] : Obesity, Hx of ICP, possible NAFLD

## 2024-01-22 NOTE — PHYSICAL EXAM
[General Appearance - Alert] : alert [General Appearance - In No Acute Distress] : in no acute distress [General Appearance - Well Nourished] : well nourished [General Appearance - Well Developed] : well developed [Sclera] : the sclera and conjunctiva were normal [Oropharynx] : the oropharynx was normal [Neck Appearance] : the appearance of the neck was normal [Jugular Venous Distention Increased] : there was no jugular-venous distention [] : no respiratory distress [Respiration, Rhythm And Depth] : normal respiratory rhythm and effort [Exaggerated Use Of Accessory Muscles For Inspiration] : no accessory muscle use [Auscultation Breath Sounds / Voice Sounds] : lungs were clear to auscultation bilaterally [Heart Rate And Rhythm] : heart rate was normal and rhythm regular [Edema] : there was no peripheral edema [Bowel Sounds] : normal bowel sounds [Abdomen Soft] : soft [Abdomen Tenderness] : non-tender [No CVA Tenderness] : no ~M costovertebral angle tenderness [No Spinal Tenderness] : no spinal tenderness [Abnormal Walk] : normal gait [Skin Color & Pigmentation] : normal skin color and pigmentation [Oriented To Time, Place, And Person] : oriented to person, place, and time [Impaired Insight] : insight and judgment were intact [Affect] : the affect was normal [Mood] : the mood was normal [Scleral Icterus] : No Scleral Icterus [Spider Angioma] : No spider angioma(s) were observed [Abdominal  Ascites] : no ascites [Asterixis] : no asterixis observed [Jaundice] : No jaundice [Palmar Erythema] : no Palmar Erythema [Depression] : no depression [FreeTextEntry4] : Limited exam b/o morbid obesity + pregnancy [FreeTextEntry1] : Grossly intact

## 2024-01-22 NOTE — ASSESSMENT
[FreeTextEntry1] : 39 yo morbidly obese Female with Hx of sleep apnea (has, but not using CPAP), Hx of Helicobacter (s/p failed treatment), 6 pregnancies (2003, 2006, 2011, 2013, 2019, 2023), with ICP during the 6th pregnancy (was on Ursodiol), and with elevated liver enzymes in hepatocellular pattern, noted since at least 11/2022. She was found to have MASLD w/o significant fibrosis per MR elastography. Her liver enzymes normalized after pregnancy.   Elevated liver enzymes - normalized - ICP might have contributed, but it seems that her liver enzymes have been elevated from the beginning of pregnancy, thus her MASLD could have been component too. - MRI abd/ MR elastography and labs also suggested NAFLD w/o significant fibrosis at present.  - US abd w/ Doppler 4/3/23 showed increased echogenicity liver, patent vasculature - CLD BW was unremarkable except the ICP during pregnancy - Patient has been counseled on life style interventions, including but not limited to: weight loss (3-5% loss of body weight might improve fat in the liver, while 7-10% needed for potential improvement of other components, including inflammation and scarring of the liver, called fibrosis); healthy diet, avoiding added sugars, sodas, avoiding saturated fats, limiting sodium, avoiding alcohol; and on the importance of regular exercise (> 150 min/week moderate intensity aerobic exercise with at least 2x/week muscle strengthening or exercise as tolerated). - Nutritionist ref. was placed previously - Discussed risks and benefits of semaglutide, Wegovy was not covered by her insurance and spoke to our clinical pharmacist regarding Zepbound, but it is not covered either.  - Referred to see bariatric surgeon and been followed since 10/2023. Ongoing w/u.  --- Patient is not cirrhotic and does not have significant fibrosis either. No further workup needed from hepatology standpoint prior to planned bariatric surgery. Rest or w/u , including cardiac, pulmonary evaluation / clearance per the respective subspecialties and PCP/ Avoidance of hepatotoxic medications and maintenance of stable blood pressure recommended. Spoke to Dr. Correia, and patient`s EGD from 2022 accepted.   # Elevated Kappa/Lambda ratio - Hematology ref. was placed previously, but has not been seen yet and ratio normalized, thus will hold off as per d/w hematology.    RTC 3 months

## 2024-01-22 NOTE — HISTORY OF PRESENT ILLNESS
[Tattoo] : no tattoos [IV Drug Use] : no IV drug use [Transfusion before 1992] : no transfusion before 1992 [Hemodialysis] : no hemodialysis [Transplant before 1992] : no transplant before 1992 [Alcohol Abuse] : no alcohol abuse [Autoimmune Disorder] : no autoimmune disorder [Travel to Endemic Area] : no travel to an endemic area [Occupational Exposure] : no occupational exposure [Cocaine Use] : no cocaine use [de-identified] : Hx of social alcohol (only at occasions, 1-2x a year). Cousin possibly had Hep B requiring liver transplant.   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 She has Hx of sleep apnea (has, but not using CPAP), Hx of Helicobacter (s/p failed treatment), and was seen by GI (Dr. Castañeda) for elevated liver enzymes in hepatocellular pattern, noted since at least 11/2022. She was found to have mildly elevated bile acids (12) and been treated with Ursodiol since beginning of this 2023, with improvement of her itching, but without improvement of her liver enzymes.  RUQ US 12/8/2022 but was not complete US and no Doppler. No complications during prior pregnancies, first pregnancy 2003, followed by 2006, 2011, 2013, 2019.       She did c/o acid reflux, and reports "lot of gas".   She lost follow up after the initial 3/29/23 appointment and RTC 9/6/23.  Her Ursodiol was discontinued after delivery. Her BMI remained 56.   She had MR elastography 10/3/23 showing hepatomegaly (24 cm), mild steatosis, no iron deposition, no fibrosis, and a subcm R hepatic cyst. Labs 9/6/23 showed NASHFibroSure suggesting similarly S1 steatosis, N1 mild HERNANDEZ and no fibrosis. Liver enzymes and function were normal. Noted mildly elevate Kappa/Lambda ratio. Noted mild anemia (Hb 11.2).  While HbA1c was normal, fasting insulin was elevated.   10/11/23 follow up to discuss MRE and labs. Semagltuide was not covered by her insurance.  She was referred to bariatric surgeon.  She is here for follow up. She started workup for bariatric surgery.  She has not lost weight yet. No new complaints.

## 2024-02-02 ENCOUNTER — APPOINTMENT (OUTPATIENT)
Dept: SURGERY | Facility: CLINIC | Age: 39
End: 2024-02-02
Payer: MEDICAID

## 2024-02-02 VITALS
BODY MASS INDEX: 55.32 KG/M2 | HEIGHT: 61 IN | DIASTOLIC BLOOD PRESSURE: 63 MMHG | SYSTOLIC BLOOD PRESSURE: 99 MMHG | HEART RATE: 83 BPM | WEIGHT: 293 LBS | OXYGEN SATURATION: 96 % | TEMPERATURE: 98.6 F

## 2024-02-02 PROCEDURE — 99213 OFFICE O/P EST LOW 20 MIN: CPT

## 2024-02-02 NOTE — HISTORY OF PRESENT ILLNESS
[de-identified] : ESTUARDO AGUERO is a 38 year old female with morbid obesity (BMI 57.25  kg/m2) who present in the office for pre-operative follow-up and weight management program in preparation for likely Robotic assisted Laparoscopic Sleeve Gastrectomy.  Patient is here for monthly weigh ins. Today's weight is 303 lb and BMI 57.25 kg/m2.   -Labs: on vitamin D and iron, we will recheck this visit -Nutrition: Cleared From Nutrition for Surgery -Cardiology: pending clearance, -Pulmonary: has a history of severe SARAH. pending CPAP machine. stated that she has an appointment to get it fixed this Sunday, advised to start using it as soon as possible.  -GI: Had an EGD on 9/6/22 results c/w normal esophagus, irregular Z-line at 40 cm from the incisors, gastroesophageal flap valve classified as Hill grade 2 (fold present, opens with respiration), erythematous mucosa in the stomach, mucosal nodule found in the duodenum likely Brunner's gland and normal examined duodenum. H. pylori eradicated  11/06/2023 H. Pylori Urea Breath Test is negative  -Psych: Cleared -PCP Letter: pending

## 2024-02-02 NOTE — ASSESSMENT
[FreeTextEntry1] : ESTUARDO AGUERO is a 38 year old female with morbid obesity (BMI _ kg/m2) who present in the office for pre-operative follow-up and weight management program in preparation for bariatric surgery likely Robotic assisted Laparoscopic Sleeve Gastrectomy.   Patient is here for monthly weigh ins. Today's weight is 303 lb and BMI 57.25 kg/m2., patient is doing well, offers no complaints.   Today is forth month of weigh ins   Pulmonary clearance pending- to see Pulmonologist at end of this month.  PCP Letter of Support   RTO next month.

## 2024-02-02 NOTE — PHYSICAL EXAM
[Obese] : obese [Normal] : affect appropriate [de-identified] : Normoactive bowel sounds, soft and nontender, no hepatosplenomegaly or masses noted

## 2024-02-05 ENCOUNTER — APPOINTMENT (OUTPATIENT)
Dept: FAMILY MEDICINE | Facility: CLINIC | Age: 39
End: 2024-02-05

## 2024-02-06 LAB
25(OH)D3 SERPL-MCNC: 29.8 NG/ML
IRON SATN MFR SERPL: 9 %
IRON SERPL-MCNC: 42 UG/DL
TIBC SERPL-MCNC: 460 UG/DL
UIBC SERPL-MCNC: 418 UG/DL

## 2024-02-11 NOTE — PHYSICAL EXAM
[Well Nourished] : well nourished [No Acute Distress] : no acute distress [Well Developed] : well developed [Enlarged Base of the Tongue] : enlarged base of the tongue [III] : Mallampati Class: III [Normal Rate/Rhythm] : normal rate/rhythm [No Neck Mass] : no neck mass [Normal Appearance] : normal appearance [No Resp Distress] : no resp distress [No Murmurs] : no murmurs [Normal S1, S2] : normal s1, s2 [Clear to Auscultation Bilaterally] : clear to auscultation bilaterally [Benign] : benign [No Masses] : no masses [Soft] : soft [Not Tender] : not tender [No Clubbing] : no clubbing [No Focal Deficits] : no focal deficits [No Edema] : no edema [Oriented x3] : oriented x3 [Normal Affect] : normal affect

## 2024-02-12 ENCOUNTER — APPOINTMENT (OUTPATIENT)
Dept: PULMONOLOGY | Facility: CLINIC | Age: 39
End: 2024-02-12

## 2024-02-12 ENCOUNTER — NON-APPOINTMENT (OUTPATIENT)
Age: 39
End: 2024-02-12

## 2024-02-12 NOTE — DISCUSSION/SUMMARY
[FreeTextEntry1] : 38 year old woman who has a diagnosis of obstructive sleep apnea  probably diagnosed 2 years ago.  She obtained CPAP equipment but states it is not working.  She has been seen by  sleep center but states it is too far for her  She had a home overnight sleep study that confirmed obstructive sleep apnea  May be in process of ordering equipment  PLAN  I asked her to check her CPAP device ad let me know who the DME supplier I can then request the CPAP device be evaluated, repaired or replaced  will follow up with me.  Tae Bermudez MD

## 2024-02-12 NOTE — HISTORY OF PRESENT ILLNESS
[Never] : never [Obstructive Sleep Apnea] : obstructive sleep apnea [TextBox_4] : 38 year old patient presents for evaluation of  sleep apnea.  She has been diagnosed initially  by Dr Kong Walker  2 years ago..  She has not used CPAP for 1.5 years as it has malfunctioned or become reset.  She states it is not working properly  She had home overnight sleep study repeat on 07/23 that demonstrated AHI 59.2. A new order may have been placed according to notes  She recently saw Dr Ribera.          Primary doctor is  Dr Ghosh     PSH:     cholecystectomy      PMH:    anemia  elevated BMI    GERD, uses meds intermittently     SH:   never smoker       ETOH: rare     Occupation:not working   No exposure to chemicals, dust, asbestos, mold        ALLERGY:   NKDA   environmental/seasonal allergy:  pollen       Review of Systems:   No rash, skin problems No dry eyes no mouth ulcers no sinusitis, sinus infections, nasal obstruction no dysphagia no dry mouth   no arthritis no joint aches no joint swelling    no asthma but used albuterol in postpartum no pneumonia no wheeze no lung cancer   no CAD no MI no chest pain no murmur no CHF no HTN no edema    no abdominal pain fatty liver disease   no Diabetes no thyroid disease no hyperlipidemia     no bleeding   no DVT or PE   no kidney disease   no stroke no seizure

## 2024-02-14 ENCOUNTER — TRANSCRIPTION ENCOUNTER (OUTPATIENT)
Age: 39
End: 2024-02-14

## 2024-02-16 ENCOUNTER — TRANSCRIPTION ENCOUNTER (OUTPATIENT)
Age: 39
End: 2024-02-16

## 2024-02-28 ENCOUNTER — APPOINTMENT (OUTPATIENT)
Dept: RADIOLOGY | Facility: CLINIC | Age: 39
End: 2024-02-28
Payer: MEDICAID

## 2024-02-28 PROCEDURE — 71046 X-RAY EXAM CHEST 2 VIEWS: CPT

## 2024-03-01 ENCOUNTER — APPOINTMENT (OUTPATIENT)
Dept: SURGERY | Facility: CLINIC | Age: 39
End: 2024-03-01
Payer: MEDICAID

## 2024-03-01 VITALS
OXYGEN SATURATION: 97 % | HEIGHT: 61 IN | SYSTOLIC BLOOD PRESSURE: 165 MMHG | HEART RATE: 80 BPM | WEIGHT: 293 LBS | TEMPERATURE: 98.6 F | DIASTOLIC BLOOD PRESSURE: 103 MMHG | BODY MASS INDEX: 55.32 KG/M2

## 2024-03-01 PROCEDURE — 99213 OFFICE O/P EST LOW 20 MIN: CPT

## 2024-03-01 NOTE — HISTORY OF PRESENT ILLNESS
[de-identified] : ESTUARDO AGUERO is a 38 year old female with morbid obesity (BMI 58.01 kg/m2) who present in the office for pre-operative follow-up and weight management program in preparation for likely Robotic assisted Laparoscopic Sleeve Gastrectomy.  Patient is here for monthly weigh ins. Today's weight is 307  lb and BMI 58.01 kg/m2.  Pending Pulmonary clearance.   Today patient is doing well, c/o of being stressed, she gained 4 lb since last visit.

## 2024-03-01 NOTE — ASSESSMENT
[FreeTextEntry1] : ESTUARDO AGUERO is a 38 year old female with morbid obesity (BMI 58.01 kg/m2) who present in the office for pre-operative follow-up and weight management program in preparation for bariatric surgery likely Robotic assisted Laparoscopic Sleeve Gastrectomy.   Patient is here for monthly weigh ins. Today's weight is 307 lb and BMI 58.01 kg/m2, patient is doing well, offers no complaints.   Pending Clearances: Pulmonary- appt 3/5. RTO in March after sees pulmonary.  Pt wishes to have surgery in April as her mother-in-law will be in town. Will be able to schedule once cleared by Pulmonary.

## 2024-03-01 NOTE — PHYSICAL EXAM
[Obese] : obese [Normal] : affect appropriate [de-identified] : Normoactive bowel sounds, soft and nontender, no hepatosplenomegaly or masses noted

## 2024-03-03 NOTE — PHYSICAL EXAM
[No Acute Distress] : no acute distress [Well Nourished] : well nourished [Well Developed] : well developed [Enlarged Base of the Tongue] : enlarged base of the tongue [III] : Mallampati Class: III [Normal Appearance] : normal appearance [No Neck Mass] : no neck mass [Normal Rate/Rhythm] : normal rate/rhythm [Normal S1, S2] : normal s1, s2 [No Murmurs] : no murmurs [No Resp Distress] : no resp distress [Benign] : benign [Clear to Auscultation Bilaterally] : clear to auscultation bilaterally [Not Tender] : not tender [No Masses] : no masses [Soft] : soft [No Clubbing] : no clubbing [No Edema] : no edema [No Focal Deficits] : no focal deficits [Oriented x3] : oriented x3 [Normal Affect] : normal affect

## 2024-03-05 ENCOUNTER — APPOINTMENT (OUTPATIENT)
Dept: PULMONOLOGY | Facility: CLINIC | Age: 39
End: 2024-03-05
Payer: MEDICAID

## 2024-03-05 DIAGNOSIS — J45.909 UNSPECIFIED ASTHMA, UNCOMPLICATED: ICD-10-CM

## 2024-03-05 DIAGNOSIS — R06.09 OTHER FORMS OF DYSPNEA: ICD-10-CM

## 2024-03-05 DIAGNOSIS — G47.30 SLEEP APNEA, UNSPECIFIED: ICD-10-CM

## 2024-03-05 PROCEDURE — 94060 EVALUATION OF WHEEZING: CPT

## 2024-03-05 PROCEDURE — 94727 GAS DIL/WSHOT DETER LNG VOL: CPT

## 2024-03-05 PROCEDURE — 99214 OFFICE O/P EST MOD 30 MIN: CPT | Mod: 25

## 2024-03-05 PROCEDURE — 94729 DIFFUSING CAPACITY: CPT

## 2024-03-05 NOTE — HISTORY OF PRESENT ILLNESS
[Never] : never [Obstructive Sleep Apnea] : obstructive sleep apnea [TextBox_4] : 38 year old patient presents for evaluation of  sleep apnea.  She has been diagnosed initially  by Dr Kong Walker  2 years ago..  She has not used CPAP for 1.5 years as it has malfunctioned or become reset.  She states it is not working properly  She had home overnight sleep study repeat on 07/23 that demonstrated AHI 59.2. A new order may have been placed according to notes  She recently saw Dr Ribera.   I called her DME and she has had her CPAP repaired and she is using it with good result    CXR is  unremarkable      Primary doctor is  Dr Ghosh     PSH:     cholecystectomy      PMH:    anemia  elevated BMI    GERD, uses meds intermittently     SH:   never smoker       ETOH: rare     Occupation:not working   No exposure to chemicals, dust, asbestos, mold        ALLERGY:   NKDA   environmental/seasonal allergy:  pollen       Review of Systems:   No rash, skin problems No dry eyes no mouth ulcers no sinusitis, sinus infections, nasal obstruction no dysphagia no dry mouth   no arthritis no joint aches no joint swelling    no asthma but used albuterol in postpartum no pneumonia no wheeze no lung cancer   no CAD no MI no chest pain no murmur no CHF no HTN no edema    no abdominal pain fatty liver disease   no Diabetes no thyroid disease no hyperlipidemia     no bleeding   no DVT or PE   no kidney disease   no stroke no seizure

## 2024-03-05 NOTE — PROCEDURE
[FreeTextEntry1] : PFT   normal FEV1,  FVC and ratio with diminished flow at level of small airways normal lung volumes and diffusion  [facility]z [facility]

## 2024-03-05 NOTE — DISCUSSION/SUMMARY
[FreeTextEntry1] : 38 year old woman who has a diagnosis of obstructive sleep apnea  probably diagnosed 2 years ago.  She obtained CPAP equipment but states it is not working.  She has been seen by  sleep center but states it is too far for her  She had a home overnight sleep study that confirmed obstructive sleep apnea  May be in process of ordering equipment  I called her DME and she has had her CPAP repaired and she is using it with good result    CXR is  unremarkable   Her PFT demonstrated  normal FEV1,  FVC and ratio with diminished flow at level of small airways normal lung volumes and diffusion   She denies asthma symptoms  PLAN  start on albuterol MDI which she should use BID especially in preoperative period  There is no pulmonary contraindication to the planned surgery. The patient is at some increased risk for perioperative pulmonary compilations due to presence of sleep apnea obstructive airway disease and elevated BMI. The patient should have cautious monitoring for oxygen desaturation or respiratory depression in the postoperative period. BIPAP should be available if respiratory depression develops. Usual DVT prophylaxis  is necessary.     will follow up with me after surgery  Total time spent : 30 minutes Including: Preparation prior to visit - Reviewing prior record, results of tests and Consultation Reports as applicable Conducting an appropriate H & P during today's encounter Appropriate orders for tests, medications and procedures, as applicable Counseling patient  Note completion   Tae Bermudez MD

## 2024-03-12 ENCOUNTER — NON-APPOINTMENT (OUTPATIENT)
Age: 39
End: 2024-03-12

## 2024-03-13 ENCOUNTER — APPOINTMENT (OUTPATIENT)
Dept: SURGERY | Facility: CLINIC | Age: 39
End: 2024-03-13
Payer: MEDICAID

## 2024-03-13 VITALS
SYSTOLIC BLOOD PRESSURE: 134 MMHG | BODY MASS INDEX: 55.32 KG/M2 | DIASTOLIC BLOOD PRESSURE: 84 MMHG | OXYGEN SATURATION: 97 % | WEIGHT: 293 LBS | HEIGHT: 61 IN | HEART RATE: 88 BPM

## 2024-03-13 DIAGNOSIS — E66.01 MORBID (SEVERE) OBESITY DUE TO EXCESS CALORIES: ICD-10-CM

## 2024-03-13 DIAGNOSIS — Z98.84 BARIATRIC SURGERY STATUS: ICD-10-CM

## 2024-03-13 PROCEDURE — 99214 OFFICE O/P EST MOD 30 MIN: CPT

## 2024-03-13 RX ORDER — PANTOPRAZOLE 40 MG/1
40 TABLET, DELAYED RELEASE ORAL DAILY
Qty: 30 | Refills: 3 | Status: DISCONTINUED | COMMUNITY
Start: 2023-11-06 | End: 2024-03-13

## 2024-03-13 NOTE — ASSESSMENT
[FreeTextEntry1] : ESTUARDO AGUERO is a 38 year old female for Robotic assisted Laparoscopic Sleeve Gastrectomy  preop visit. Risks, benefits alternatives discussed. All questions answered. Risks discussed included: death, leak, stricture, DVT/PE, portomesenteric venous thrombosis, need for further procedures, tests or surgery, vitamin deficiency, gallstones, renal stones, inadequate weight loss, weight regain, need for open procedure, incisional hernia, bleeding, post-operative nausea/vomiting requiring hospitalization and intestinal obstruction.

## 2024-03-13 NOTE — HISTORY OF PRESENT ILLNESS
[de-identified] : ESTUARDO AGUERO is a 38 year old female has undergone extensive workup and has been deemed an appropriate candidate for Robotic assisted Laparoscopic Sleeve Gastrectomy. At this preop appointment, I discussed with the patient the risks, benefits, and alternatives to bariatric surgery. I specifically discussed the risks of DVT/PE, pneumonia, dehydration, injury to the stomach and surrounding organs, and anesthesia risks including cardiac and pulmonary complications. I discussed pre and post-operative diet protocols, to begin with a two week partial liquid diet. I discussed the importance of exercise in both the pre and post-operative time periods. The patient had ample opportunity to ask questions and all questions were answered.

## 2024-03-13 NOTE — REASON FOR VISIT
[Follow-Up Visit] : a follow-up visit for [Morbid Obesity (BMI>40)] : morbid obesity (bmi>40) [FreeTextEntry2] : PreOP for Robotic assisted Laparoscopic Sleeve Gastrectomy

## 2024-03-13 NOTE — PLAN
[FreeTextEntry1] : Please follow up at the office for postop visit and as needed for any questions or concerns

## 2024-03-13 NOTE — PHYSICAL EXAM
[Obese] : obese [Normal] : affect appropriate [de-identified] : Normoactive bowel sounds, soft and nontender, no hepatosplenomegaly or masses noted

## 2024-03-18 ENCOUNTER — APPOINTMENT (OUTPATIENT)
Dept: FAMILY MEDICINE | Facility: CLINIC | Age: 39
End: 2024-03-18
Payer: MEDICAID

## 2024-03-18 ENCOUNTER — TRANSCRIPTION ENCOUNTER (OUTPATIENT)
Age: 39
End: 2024-03-18

## 2024-03-18 ENCOUNTER — OUTPATIENT (OUTPATIENT)
Dept: OUTPATIENT SERVICES | Facility: HOSPITAL | Age: 39
LOS: 1 days | End: 2024-03-18
Payer: MEDICAID

## 2024-03-18 VITALS
HEART RATE: 87 BPM | WEIGHT: 293 LBS | DIASTOLIC BLOOD PRESSURE: 90 MMHG | BODY MASS INDEX: 55.32 KG/M2 | TEMPERATURE: 98 F | OXYGEN SATURATION: 96 % | HEIGHT: 61 IN | SYSTOLIC BLOOD PRESSURE: 135 MMHG

## 2024-03-18 VITALS
SYSTOLIC BLOOD PRESSURE: 122 MMHG | HEIGHT: 61 IN | WEIGHT: 293 LBS | TEMPERATURE: 99 F | OXYGEN SATURATION: 98 % | RESPIRATION RATE: 18 BRPM | DIASTOLIC BLOOD PRESSURE: 83 MMHG | HEART RATE: 75 BPM

## 2024-03-18 DIAGNOSIS — G47.33 OBSTRUCTIVE SLEEP APNEA (ADULT) (PEDIATRIC): ICD-10-CM

## 2024-03-18 DIAGNOSIS — Z01.818 ENCOUNTER FOR OTHER PREPROCEDURAL EXAMINATION: ICD-10-CM

## 2024-03-18 DIAGNOSIS — J30.9 ALLERGIC RHINITIS, UNSPECIFIED: ICD-10-CM

## 2024-03-18 DIAGNOSIS — D64.9 ANEMIA, UNSPECIFIED: ICD-10-CM

## 2024-03-18 DIAGNOSIS — Z98.890 OTHER SPECIFIED POSTPROCEDURAL STATES: Chronic | ICD-10-CM

## 2024-03-18 DIAGNOSIS — E66.01 MORBID (SEVERE) OBESITY DUE TO EXCESS CALORIES: ICD-10-CM

## 2024-03-18 DIAGNOSIS — K76.0 FATTY (CHANGE OF) LIVER, NOT ELSEWHERE CLASSIFIED: ICD-10-CM

## 2024-03-18 DIAGNOSIS — K21.9 GASTRO-ESOPHAGEAL REFLUX DISEASE WITHOUT ESOPHAGITIS: ICD-10-CM

## 2024-03-18 DIAGNOSIS — Z90.49 ACQUIRED ABSENCE OF OTHER SPECIFIED PARTS OF DIGESTIVE TRACT: Chronic | ICD-10-CM

## 2024-03-18 DIAGNOSIS — J45.909 UNSPECIFIED ASTHMA, UNCOMPLICATED: ICD-10-CM

## 2024-03-18 LAB
ALBUMIN SERPL ELPH-MCNC: 3.6 G/DL — SIGNIFICANT CHANGE UP (ref 3.5–5)
ALP SERPL-CCNC: 74 U/L — SIGNIFICANT CHANGE UP (ref 40–120)
ALT FLD-CCNC: 51 U/L DA — SIGNIFICANT CHANGE UP (ref 10–60)
ANION GAP SERPL CALC-SCNC: 7 MMOL/L — SIGNIFICANT CHANGE UP (ref 5–17)
APPEARANCE UR: ABNORMAL
APTT BLD: 36 SEC — HIGH (ref 24.5–35.6)
AST SERPL-CCNC: 38 U/L — SIGNIFICANT CHANGE UP (ref 10–40)
BACTERIA # UR AUTO: ABNORMAL /HPF
BILIRUB SERPL-MCNC: 0.4 MG/DL — SIGNIFICANT CHANGE UP (ref 0.2–1.2)
BILIRUB UR-MCNC: NEGATIVE — SIGNIFICANT CHANGE UP
BLD GP AB SCN SERPL QL: SIGNIFICANT CHANGE UP
BUN SERPL-MCNC: 13 MG/DL — SIGNIFICANT CHANGE UP (ref 7–18)
CALCIUM SERPL-MCNC: 9.4 MG/DL — SIGNIFICANT CHANGE UP (ref 8.4–10.5)
CHLORIDE SERPL-SCNC: 108 MMOL/L — SIGNIFICANT CHANGE UP (ref 96–108)
CO2 SERPL-SCNC: 24 MMOL/L — SIGNIFICANT CHANGE UP (ref 22–31)
COLOR SPEC: SIGNIFICANT CHANGE UP
COMMENT - URINE: SIGNIFICANT CHANGE UP
CREAT SERPL-MCNC: 0.61 MG/DL — SIGNIFICANT CHANGE UP (ref 0.5–1.3)
DIFF PNL FLD: NEGATIVE — SIGNIFICANT CHANGE UP
EGFR: 117 ML/MIN/1.73M2 — SIGNIFICANT CHANGE UP
EPI CELLS # UR: PRESENT
GLUCOSE SERPL-MCNC: 92 MG/DL — SIGNIFICANT CHANGE UP (ref 70–99)
GLUCOSE UR QL: NEGATIVE MG/DL — SIGNIFICANT CHANGE UP
HCT VFR BLD CALC: 39.1 % — SIGNIFICANT CHANGE UP (ref 34.5–45)
HGB BLD-MCNC: 11.9 G/DL — SIGNIFICANT CHANGE UP (ref 11.5–15.5)
INR BLD: 1.14 RATIO — SIGNIFICANT CHANGE UP (ref 0.85–1.18)
KETONES UR-MCNC: 80 MG/DL
LEUKOCYTE ESTERASE UR-ACNC: NEGATIVE — SIGNIFICANT CHANGE UP
MCHC RBC-ENTMCNC: 23.8 PG — LOW (ref 27–34)
MCHC RBC-ENTMCNC: 30.4 GM/DL — LOW (ref 32–36)
MCV RBC AUTO: 78.4 FL — LOW (ref 80–100)
NITRITE UR-MCNC: NEGATIVE — SIGNIFICANT CHANGE UP
NRBC # BLD: 0 /100 WBCS — SIGNIFICANT CHANGE UP (ref 0–0)
PH UR: 5.5 — SIGNIFICANT CHANGE UP (ref 5–8)
PLATELET # BLD AUTO: 353 K/UL — SIGNIFICANT CHANGE UP (ref 150–400)
POTASSIUM SERPL-MCNC: 3.8 MMOL/L — SIGNIFICANT CHANGE UP (ref 3.5–5.3)
POTASSIUM SERPL-SCNC: 3.8 MMOL/L — SIGNIFICANT CHANGE UP (ref 3.5–5.3)
PROT SERPL-MCNC: 8.1 G/DL — SIGNIFICANT CHANGE UP (ref 6–8.3)
PROT UR-MCNC: 30 MG/DL
PROTHROM AB SERPL-ACNC: 13 SEC — SIGNIFICANT CHANGE UP (ref 9.5–13)
RBC # BLD: 4.99 M/UL — SIGNIFICANT CHANGE UP (ref 3.8–5.2)
RBC # FLD: 18.2 % — HIGH (ref 10.3–14.5)
RBC CASTS # UR COMP ASSIST: 2 /HPF — SIGNIFICANT CHANGE UP (ref 0–4)
SODIUM SERPL-SCNC: 139 MMOL/L — SIGNIFICANT CHANGE UP (ref 135–145)
SP GR SPEC: 1.03 — SIGNIFICANT CHANGE UP (ref 1–1.03)
UROBILINOGEN FLD QL: 0.2 MG/DL — SIGNIFICANT CHANGE UP (ref 0.2–1)
WBC # BLD: 9.24 K/UL — SIGNIFICANT CHANGE UP (ref 3.8–10.5)
WBC # FLD AUTO: 9.24 K/UL — SIGNIFICANT CHANGE UP (ref 3.8–10.5)
WBC UR QL: 2 /HPF — SIGNIFICANT CHANGE UP (ref 0–5)

## 2024-03-18 PROCEDURE — 99214 OFFICE O/P EST MOD 30 MIN: CPT | Mod: 25

## 2024-03-18 RX ORDER — CHOLECALCIFEROL (VITAMIN D3) 125 MCG
1 CAPSULE ORAL
Refills: 0 | DISCHARGE

## 2024-03-18 RX ORDER — OMEPRAZOLE 10 MG/1
1 CAPSULE, DELAYED RELEASE ORAL
Refills: 0 | DISCHARGE

## 2024-03-18 RX ORDER — HYOSCYAMINE SULFATE 0.13 MG
1 TABLET ORAL
Refills: 0 | DISCHARGE

## 2024-03-18 RX ORDER — ACETAMINOPHEN 500 MG
2 TABLET ORAL
Refills: 0 | DISCHARGE

## 2024-03-18 RX ORDER — LORATADINE 10 MG/1
1 TABLET ORAL
Refills: 0 | DISCHARGE

## 2024-03-18 RX ORDER — IRON,CARBONYL/ASCORBIC ACID 65MG-125MG
1 TABLET, DELAYED RELEASE (ENTERIC COATED) ORAL
Refills: 0 | DISCHARGE

## 2024-03-18 RX ORDER — ONDANSETRON 8 MG/1
1 TABLET, FILM COATED ORAL
Refills: 0 | DISCHARGE

## 2024-03-18 RX ORDER — FAMOTIDINE 10 MG/ML
1 INJECTION INTRAVENOUS
Refills: 0 | DISCHARGE

## 2024-03-18 RX ORDER — SIMETHICONE 125 MG/1
125 TABLET, CHEWABLE ORAL
Qty: 120 | Refills: 2 | Status: DISCONTINUED | COMMUNITY
Start: 2023-11-06 | End: 2024-03-18

## 2024-03-18 RX ORDER — APIXABAN 2.5 MG/1
1 TABLET, FILM COATED ORAL
Refills: 0 | DISCHARGE

## 2024-03-18 NOTE — H&P PST ADULT - RESPIRATORY AND THORAX COMMENTS
asthma-last attack in May 2023-never hospitalized and intubated asthma-last attack in May 2023-never hospitalized and intubated; severe SARAH on CPAP; allergic rhinitis, COVID-19 virus infection in May 2023-fully recovered,

## 2024-03-18 NOTE — H&P PST ADULT - NSICDXPROCEDURE_GEN_ALL_CORE_FT
PROCEDURES:  Robot-assisted laparoscopic sleeve gastrectomy 18-Mar-2024 18:31:12  Nadine Carrero  Repair, hiatal hernia, robot-assisted 18-Mar-2024 18:31:23  Nadine Carrero

## 2024-03-18 NOTE — H&P PST ADULT - PROBLEM SELECTOR PLAN 1
Pt is scheduled for robotic assisted laparoscopic sleeve gastrectomy possible hiatal hernia repair possible open on 3/25/2024.  Preoperative instructions discussed with pt and given to pt. Instructed pt that she will need someone to escort her home after surgery, to notify security when she arrives in the lobby of the hospital that she is here for surgery, not to eat or drink anything after midnight the night before the surgery, to avoid NSAIDs such as Ibuprofen, Motrin, Aleve, Advil, Naproxen before surgery, to take Tylenol if needed for pain, to report if she has been exposed to any one with any contagious diseases including Covid-19 or if she is exhibiting any symptoms of COVID-19. Instructed about use of Chlorhexidine 4% soap for 3 days before surgery including the morning of the surgery to prevent infection. Verbalized understanding of instructions given.

## 2024-03-18 NOTE — H&P PST ADULT - PROBLEM SELECTOR PLAN 4
Instructed to continue use of inhaler and use same on day of surgery.   Instructed to bring inhalers to hospital on day of surgery.  Follow-up with PCP for management.

## 2024-03-18 NOTE — H&P PST ADULT - ASSESSMENT
This is a 38 yr old  female with PMH of severe SARAH on CPAP, HTN-not on meds, GERD, asthma-last attack, in May 2023-never intubated and hospitalized, allergic rhinitis, non-alcoholic fatty liver, COVID-19 virus infection in December 2022-fully recovered,  presents with morbid obesity. Pt is scheduled for robotic assisted laparoscopic sleeve gastrectomy possible hiatal hernia repair possible open on 3/25/2024.   This is a 38 yr old  female with PMH of severe SARAH on CPAP, HTN-not on meds, GERD, asthma-last attack, in May 2023-never intubated and hospitalized, allergic rhinitis, non-alcoholic fatty liver, anemia, COVID-19 virus infection in December 2022-fully recovered,  presents with morbid obesity. Pt is scheduled for robotic assisted laparoscopic sleeve gastrectomy possible hiatal hernia repair possible open on 3/25/2024.

## 2024-03-18 NOTE — H&P PST ADULT - HISTORY OF PRESENT ILLNESS
This is [age] yr old male with PMH of SARAH on CPAP, HTN, GERD, asthma-last attack, COVID-19 virus infection in December 2022-fully recovered,  presents with c/o inability to lose weight despite attempting different modalities. Pt for laparoscopic sleeve gastrectomy possible open open,possible intraoperative esophagogastroduodenoscopy  This is a 38 yr old  female with PMH of severe SARAH on CPAP, HTN-not on meds, GERD, asthma-last attack, in May 2023-never intubated and hospitalized, non-alcoholic fatty liver, COVID-19 virus infection in December 2022-fully recovered,  presents with morbid obesity. Pt c/o inability to lose weight despite attempting different modalities. Pt is scheduled for robotic assisted laparoscopic sleeve gastrectomy possible hiatal hernia repair possible open on 3/25/2024. This is a 38 yr old  female with PMH of severe SARAH on CPAP, HTN-not on meds, GERD, asthma-last attack, in May 2023-never intubated and hospitalized, non-alcoholic fatty liver, anemia, COVID-19 virus infection in December 2022-fully recovered,  presents with morbid obesity. Pt c/o inability to lose weight despite attempting different modalities. Pt is scheduled for robotic assisted laparoscopic sleeve gastrectomy possible hiatal hernia repair possible open on 3/25/2024.

## 2024-03-18 NOTE — H&P PST ADULT - PROBLEM SELECTOR PLAN 2
Pt with severe SARAH on CPAP.  Pulmonary precautions to be maintained perioperatively.   Instructed pt to bring CPAP machine to hospital on day of surgery to facilitate reinstitution of treatment after surgery.  Sleep study report obtained and attached to chart.

## 2024-03-18 NOTE — H&P PST ADULT - PROBLEM SELECTOR PLAN 7
Last Hg 11.9 and Hct 39.1-MCV 78.4.  Instructed to continue Iron as per provider and to follow-up with provider for management.

## 2024-03-19 LAB
A1C WITH ESTIMATED AVERAGE GLUCOSE RESULT: 5.5 % — SIGNIFICANT CHANGE UP (ref 4–5.6)
CULTURE RESULTS: SIGNIFICANT CHANGE UP
ESTIMATED AVERAGE GLUCOSE: 111 MG/DL — SIGNIFICANT CHANGE UP (ref 68–114)
SPECIMEN SOURCE: SIGNIFICANT CHANGE UP

## 2024-03-19 PROCEDURE — G0463: CPT

## 2024-03-19 PROCEDURE — 83036 HEMOGLOBIN GLYCOSYLATED A1C: CPT

## 2024-03-19 NOTE — REVIEW OF SYSTEMS
[Frequency] : frequency [Fever] : no fever [Chills] : no chills [Pain] : no pain [Vision Problems] : no vision problems [Nasal Discharge] : no nasal discharge [Sore Throat] : no sore throat [Chest Pain] : no chest pain [Palpitations] : no palpitations [Leg Claudication] : no leg claudication [Lower Ext Edema] : no lower extremity edema [Orthopnea] : no orthopnea [Paroxysmal Nocturnal Dyspnea] : no paroxysmal nocturnal dyspnea [Shortness Of Breath] : no shortness of breath [Wheezing] : no wheezing [Cough] : no cough [Dyspnea on Exertion] : no dyspnea on exertion [Abdominal Pain] : no abdominal pain [Constipation] : no constipation [Vomiting] : no vomiting [Melena] : no melena [Dysuria] : no dysuria [Hematuria] : no hematuria [Joint Swelling] : no joint swelling [Muscle Weakness] : no muscle weakness [Muscle Pain] : no muscle pain [Itching] : no itching [Skin Rash] : no skin rash [Dizziness] : no dizziness [Fainting] : no fainting [Easy Bleeding] : no easy bleeding [Easy Bruising] : no easy bruising

## 2024-03-19 NOTE — PHYSICAL EXAM
[No Acute Distress] : no acute distress [Well Nourished] : well nourished [Well Developed] : well developed [Well-Appearing] : well-appearing [Normal Sclera/Conjunctiva] : normal sclera/conjunctiva [EOMI] : extraocular movements intact [Normal Outer Ear/Nose] : the outer ears and nose were normal in appearance [Normal Nasal Mucosa] : the nasal mucosa was normal [No Respiratory Distress] : no respiratory distress  [No Accessory Muscle Use] : no accessory muscle use [Clear to Auscultation] : lungs were clear to auscultation bilaterally [Normal Rate] : normal rate  [Regular Rhythm] : with a regular rhythm [Normal S1, S2] : normal S1 and S2 [No Edema] : there was no peripheral edema [Soft] : abdomen soft [Non-distended] : non-distended [Non Tender] : non-tender [Normal Bowel Sounds] : normal bowel sounds [No CVA Tenderness] : no CVA  tenderness [No Spinal Tenderness] : no spinal tenderness [No Joint Swelling] : no joint swelling [Grossly Normal Strength/Tone] : grossly normal strength/tone [Coordination Grossly Intact] : coordination grossly intact [Normal Gait] : normal gait [Normal Affect] : the affect was normal [Normal Insight/Judgement] : insight and judgment were intact [Supple] : supple [No Lymphadenopathy] : no lymphadenopathy [Normal Posterior Cervical Nodes] : no posterior cervical lymphadenopathy [Normal Anterior Cervical Nodes] : no anterior cervical lymphadenopathy [de-identified] : +obese

## 2024-03-19 NOTE — PLAN
[FreeTextEntry1] : 1. Pre-op Eval - Sleeve Gastrectomy on 3/25 - No known hx of structural or arrhythmic cardiac conditions, previous adverse reactions to anesthesia  - Able to complete >4METs - States she is scheduled for PST today - Has hx of Sleep apnea -- pulmonology optimization appreciated - S/p TTE and exercise stress test without ischemia -- cardiology optimization appreciated - optimization pending results of PST labs   Addendum 3/19/24:  - PST labs reviewed - Pt is medically optimized as best as possible at this time for Sleeve Gastrectomy on 3/25

## 2024-03-19 NOTE — HISTORY OF PRESENT ILLNESS
[(Patient denies any chest pain, claudication, dyspnea on exertion, orthopnea, palpitations or syncope)] : Patient denies any chest pain, claudication, dyspnea on exertion, orthopnea, palpitations or syncope [____ METs%] : [unfilled] METs% [FreeTextEntry1] : Sleeve Gastrectomy [FreeTextEntry2] : 3/25/24 [FreeTextEntry3] : Dr. Lara Correia [FreeTextEntry4] : The patient presents today for medical optimization for sleeve gastrectomy  Pt denies cp or SOB with going up 2 flights of stairs. Denies previous adverse reaction with anesthesia. Denies history of structural or arrhythmic cardiac disease. Has hx of sleep apnea, but compliant with CPAP

## 2024-03-25 ENCOUNTER — APPOINTMENT (OUTPATIENT)
Dept: SURGERY | Facility: HOSPITAL | Age: 39
End: 2024-03-25

## 2024-03-25 ENCOUNTER — INPATIENT (INPATIENT)
Facility: HOSPITAL | Age: 39
LOS: 0 days | Discharge: HOME | End: 2024-03-25
Attending: SURGERY | Admitting: SURGERY

## 2024-03-25 DIAGNOSIS — Z98.891 HISTORY OF UTERINE SCAR FROM PREVIOUS SURGERY: Chronic | ICD-10-CM

## 2024-03-25 DIAGNOSIS — Z90.49 ACQUIRED ABSENCE OF OTHER SPECIFIED PARTS OF DIGESTIVE TRACT: Chronic | ICD-10-CM

## 2024-03-25 DIAGNOSIS — Z98.890 OTHER SPECIFIED POSTPROCEDURAL STATES: Chronic | ICD-10-CM

## 2024-03-25 DIAGNOSIS — E66.01 MORBID (SEVERE) OBESITY DUE TO EXCESS CALORIES: ICD-10-CM

## 2024-04-03 ENCOUNTER — APPOINTMENT (OUTPATIENT)
Dept: SURGERY | Facility: CLINIC | Age: 39
End: 2024-04-03

## 2024-04-16 ENCOUNTER — EMERGENCY (EMERGENCY)
Facility: HOSPITAL | Age: 39
LOS: 1 days | Discharge: HOME | End: 2024-04-16
Attending: EMERGENCY MEDICINE
Payer: MEDICAID

## 2024-04-16 VITALS
HEIGHT: 61 IN | DIASTOLIC BLOOD PRESSURE: 80 MMHG | SYSTOLIC BLOOD PRESSURE: 140 MMHG | TEMPERATURE: 98 F | HEART RATE: 83 BPM | RESPIRATION RATE: 17 BRPM | WEIGHT: 293 LBS | OXYGEN SATURATION: 98 %

## 2024-04-16 DIAGNOSIS — Z98.890 OTHER SPECIFIED POSTPROCEDURAL STATES: Chronic | ICD-10-CM

## 2024-04-16 DIAGNOSIS — Z98.891 HISTORY OF UTERINE SCAR FROM PREVIOUS SURGERY: Chronic | ICD-10-CM

## 2024-04-16 DIAGNOSIS — Z90.49 ACQUIRED ABSENCE OF OTHER SPECIFIED PARTS OF DIGESTIVE TRACT: Chronic | ICD-10-CM

## 2024-04-16 PROCEDURE — 99284 EMERGENCY DEPT VISIT MOD MDM: CPT

## 2024-04-16 PROCEDURE — 73562 X-RAY EXAM OF KNEE 3: CPT | Mod: 26,50

## 2024-04-16 PROCEDURE — 73562 X-RAY EXAM OF KNEE 3: CPT

## 2024-04-16 PROCEDURE — 99283 EMERGENCY DEPT VISIT LOW MDM: CPT | Mod: 25

## 2024-04-16 RX ORDER — IBUPROFEN 200 MG
600 TABLET ORAL ONCE
Refills: 0 | Status: COMPLETED | OUTPATIENT
Start: 2024-04-16 | End: 2024-04-16

## 2024-04-16 RX ORDER — ACETAMINOPHEN 500 MG
650 TABLET ORAL ONCE
Refills: 0 | Status: COMPLETED | OUTPATIENT
Start: 2024-04-16 | End: 2024-04-16

## 2024-04-16 RX ADMIN — Medication 600 MILLIGRAM(S): at 09:25

## 2024-04-16 RX ADMIN — Medication 650 MILLIGRAM(S): at 09:25

## 2024-04-16 NOTE — ED PROVIDER NOTE - NSFOLLOWUPINSTRUCTIONS_ED_ALL_ED_FT
Contusion  A contusion is a deep bruise. Contusions are the result of a blunt injury to tissues and muscle fibers under the skin. The injury causes bleeding under the skin. The skin over the contusion may turn blue, purple, or yellow. Minor injuries will give you a painless contusion, but more severe injuries cause contusions that can stay painful and swollen for a few weeks.    Follow these instructions at home:  Pay attention to any changes in your symptoms. Let your health care provider know about them. Take these actions to relieve your pain.    Managing pain, stiffness, and swelling    Bag of ice on a towel on the skin.  Use resting, icing, applying pressure (compression), and raising (elevating) the injured area. This is often called the RICE method.  Rest the injured area. Return to your normal activities as told by your health care provider. Ask your health care provider what activities are safe for you.  If directed, put ice on the injured area. To do this:  Put ice in a plastic bag.  Place a towel between your skin and the bag.  Leave the ice on for 20 minutes, 2–3 times a day.  If your skin turns bright red, remove the ice right away to prevent skin damage. The risk of skin damage is higher if you cannot feel pain, heat, or cold.  If directed, apply light compression to the injured area using an elastic bandage. Make sure the bandage is not wrapped too tightly. Remove and reapply the bandage as directed by your health care provider.  If possible, elevate the injured area above the level of your heart while you are sitting or lying down.  General instructions    Take over-the-counter and prescription medicines only as told by your health care provider.  Keep all follow-up visits. Your health care provider may want to see how your contusion is healing with treatment.  Contact a health care provider if:  Your symptoms do not improve after several days of treatment.  Your symptoms get worse.  You have difficulty moving the injured area.  Get help right away if:  You have severe pain.  You have numbness in a hand or foot.  Your hand or foot turns pale or cold.  This information is not intended to replace advice given to you by your health care provider. Make sure you discuss any questions you have with your health care provider.    Document Revised: 06/05/2023 Document Reviewed: 06/05/2023  [employer] Patient Education © 2024 [employer]  Elsevier logo  Terms and Conditions  Privacy Policy  Editorial Policy  All content on this site: Copyright © 2024 Else[employer], its licensors, and contributors. All rights are reserved, including those for text and data mining, AI training, and similar technologies. For all open access content, the Creative Commons licensing terms apply.  Cookies are used by this site. To decline or learn more, visit our Cookies page.

## 2024-04-16 NOTE — ED PROVIDER NOTE - CLINICAL SUMMARY MEDICAL DECISION MAKING FREE TEXT BOX
30-year-old female with bilateral traumatic knee pain.  PE as above.  X-ray, Motrin, Tylenol.  Reassess

## 2024-04-16 NOTE — ED PROVIDER NOTE - PHYSICAL EXAMINATION
Right knee is nontender but decreased range of motion secondary to swelling.  Left knee with full range of motion and is nontender.  Patient is ambulatory with assistive device of crutches.

## 2024-04-16 NOTE — ED PROVIDER NOTE - OBJECTIVE STATEMENT
38-year-old female denies past medical history presents with bilateral knee pain worse on the right side after mechanical fall at home.  Patient states was walking in from her balcony tripped and fell onto bilateral knees.  States she was able to walk afterwards with pain.  Denies head trauma or LOC.  Denies all other complaints.

## 2024-04-16 NOTE — ED PROVIDER NOTE - PROGRESS NOTE DETAILS
No fracture appreciated.  Pain likely secondary to contusion.  Will Ace wrap knees.  Advised Motrin and Tylenol for pain.  Follow-up with primary care doctor and orthopedic surgery.  Return precautions discussed

## 2024-04-16 NOTE — ED PROVIDER NOTE - PATIENT PORTAL LINK FT
You can access the FollowMyHealth Patient Portal offered by [facility] by registering at the following website: [email or web address]. By joining [employer]’s FollowMyHealth portal, you will also be able to view your health information using other applications (apps) compatible with our system.

## 2024-04-25 NOTE — OB PROVIDER DELIVERY SUMMARY - NSVAGINALEXAMCERT_OBGYN_ALL_OB
Duke  -Nephrology following, Dr. Garcia  -NP. The Delivery OB Provider certifies that vaginal examination and/or abdominal examination after the delivery was done and no foreign body was found.

## 2024-05-01 ENCOUNTER — APPOINTMENT (OUTPATIENT)
Dept: SURGERY | Facility: CLINIC | Age: 39
End: 2024-05-01

## 2024-05-06 ENCOUNTER — APPOINTMENT (OUTPATIENT)
Dept: GASTROENTEROLOGY | Facility: CLINIC | Age: 39
End: 2024-05-06
Payer: MEDICAID

## 2024-05-06 VITALS
WEIGHT: 293 LBS | HEART RATE: 72 BPM | DIASTOLIC BLOOD PRESSURE: 90 MMHG | SYSTOLIC BLOOD PRESSURE: 148 MMHG | OXYGEN SATURATION: 99 % | TEMPERATURE: 97.9 F | HEIGHT: 61 IN | BODY MASS INDEX: 55.32 KG/M2

## 2024-05-06 DIAGNOSIS — K76.0 FATTY (CHANGE OF) LIVER, NOT ELSEWHERE CLASSIFIED: ICD-10-CM

## 2024-05-06 DIAGNOSIS — R10.84 GENERALIZED ABDOMINAL PAIN: ICD-10-CM

## 2024-05-06 DIAGNOSIS — Z86.19 PERSONAL HISTORY OF OTHER INFECTIOUS AND PARASITIC DISEASES: ICD-10-CM

## 2024-05-06 DIAGNOSIS — R10.13 EPIGASTRIC PAIN: ICD-10-CM

## 2024-05-06 DIAGNOSIS — D64.9 ANEMIA, UNSPECIFIED: ICD-10-CM

## 2024-05-06 PROCEDURE — 99214 OFFICE O/P EST MOD 30 MIN: CPT

## 2024-05-06 RX ORDER — HYDROCORTISONE 10 MG/G
1 CREAM TOPICAL
Qty: 1 | Refills: 1 | Status: ACTIVE | COMMUNITY
Start: 2023-06-02

## 2024-05-06 RX ORDER — SIMETHICONE 125 MG/1
125 TABLET, CHEWABLE ORAL
Qty: 120 | Refills: 3 | Status: ACTIVE | COMMUNITY
Start: 2024-05-06 | End: 1900-01-01

## 2024-05-06 RX ORDER — HYOSCYAMINE SULFATE 0.12 MG/1
0.12 TABLET SUBLINGUAL 3 TIMES DAILY
Qty: 21 | Refills: 0 | Status: COMPLETED | COMMUNITY
Start: 2024-03-13 | End: 2024-05-06

## 2024-05-06 RX ORDER — ONDANSETRON 4 MG/1
4 TABLET, ORALLY DISINTEGRATING ORAL 3 TIMES DAILY
Qty: 42 | Refills: 0 | Status: ACTIVE | COMMUNITY
Start: 2024-03-13

## 2024-05-06 RX ORDER — APIXABAN 2.5 MG/1
2.5 TABLET, FILM COATED ORAL
Qty: 60 | Refills: 0 | Status: COMPLETED | COMMUNITY
Start: 2024-03-13 | End: 2024-05-06

## 2024-05-06 RX ORDER — KETOCONAZOLE 20 MG/G
2 CREAM TOPICAL
Qty: 1 | Refills: 1 | Status: COMPLETED | COMMUNITY
Start: 2023-10-03 | End: 2024-05-06

## 2024-05-06 RX ORDER — SUCRALFATE 1 G/1
1 TABLET ORAL 4 TIMES DAILY
Qty: 120 | Refills: 2 | Status: ACTIVE | COMMUNITY
Start: 2024-05-06 | End: 1900-01-01

## 2024-05-06 RX ORDER — DICYCLOMINE HYDROCHLORIDE 10 MG/1
10 CAPSULE ORAL 3 TIMES DAILY
Qty: 90 | Refills: 3 | Status: ACTIVE | COMMUNITY
Start: 2024-05-06 | End: 1900-01-01

## 2024-05-06 RX ORDER — OMEPRAZOLE 40 MG/1
40 CAPSULE, DELAYED RELEASE ORAL
Qty: 30 | Refills: 2 | Status: COMPLETED | COMMUNITY
Start: 2024-03-13 | End: 2024-05-06

## 2024-05-06 RX ORDER — DM/P-EPHED/ACETAMINOPH/DOXYLAM
125 MCG LIQUID (ML) ORAL
Qty: 30 | Refills: 3 | Status: ACTIVE | COMMUNITY
Start: 2023-10-30

## 2024-05-06 RX ORDER — ALBUTEROL SULFATE 90 UG/1
108 (90 BASE) INHALANT RESPIRATORY (INHALATION)
Qty: 1 | Refills: 3 | Status: ACTIVE | COMMUNITY
Start: 2024-03-05

## 2024-05-06 RX ORDER — IRON,CARBONYL/ASCORBIC ACID 65MG-125MG
65-125 TABLET, DELAYED RELEASE (ENTERIC COATED) ORAL
Qty: 30 | Refills: 3 | Status: ACTIVE | COMMUNITY
Start: 2023-10-30

## 2024-05-06 RX ORDER — ALBUTEROL SULFATE 90 UG/1
108 (90 BASE) INHALANT RESPIRATORY (INHALATION)
Qty: 1 | Refills: 0 | Status: COMPLETED | COMMUNITY
Start: 2023-05-05 | End: 2024-05-06

## 2024-05-06 NOTE — ASSESSMENT
[FreeTextEntry1] : Abdominal Pain: The patient complains of abdominal pain. The patient is to avoid nonsteroidal anti-inflammatory drugs and aspirin.  I recommend a low FOD-MAP diet.  I recommend a trial of Dicyclomine 10 mg tablet PO 3 times a day PRN for the abdominal pain. .  I recommend a trial of Carafate 1 gram PO 4 times a day for 3 months for the symptoms. Dyspepsia: The patient complains of dyspeptic symptoms.  The patient was advised to continue to abide by an anti-gas (low FOD-MAP) diet.  The patient was previously given a pamphlet for anti-gas (low FOD-MAP).  The patient and I reviewed the anti-gas (low FOD-MAP) diet at length again. The patient is to continue on a trial of Simethicone one tablet 4 times a day p.r.n. abdominal pain and gas. Anemia:  The Blood work performed on February 2, 2024 revealed a normal serum iron level of 42 mcg/dL, and elevated TIBC/UIBC of 460/43 mcg/dL, respectively, a low iron percent saturation 9%,  a low hemoglobin of 11.0 g/dL, a normal hematocrit level of 36.5%, a normal platelet count of 383,000, a normal WBC count of 8.28 K/UL. Blood Work: I reviewed the recent blood work performed by the patient's PMD. History of Cholestasis of Pregnancy: The patient was previously diagnosed with cholestasis of pregnancy. The patient is being followed by her hepatologist, Dr. Jeffrey Hoang. The patient is currently off ursodiol 300 mg twice a day for the cholestasis of pregnancy, s/p delivery. The patient was advised to follow-up with her hepatologist regarding the results of the blood work and abdominal ultrasound. Obesity: The patient is morbidly obese. The patient was advised to lose weight and exercise. The patient was advised to followup with a nutritionist regarding dieting for the weight loss. The patient cannot lose weight. The patient is being evaluated with bariatric surgeon. The patient is being followed for bariatric surgery for obesity with for possible gastric sleeve surgery after delivery. The patient agrees and will follow-up. I reviewed the prior upper endoscopy results and revealed no contraindications for bariatric surgery. The patient agreed and will followup. Prior Endoscopic Procedures: The patient had a prior upper endoscopy performed by another gastroenterologist Dr. Eve Lopez. I reviewed the prior upper endoscopy reports. H. pylori Gastritis: The patient was found to have H. pylori gastritis on prior upper endoscopy. The patient completed a trial of Clarithromycin 500 mg 2 times a day, Amoxicillin 500 mg 2 tablets twice a day and Omeprazole 40 mg twice a day for 14 days for H. pylori eradication. The patient is s/p treatment for H. pylori eradication but did not have followup testing to assess for eradication. The H. pylori breath test performed on January 9, 2023 was detected. I recommend a trial of antibiotics for H. pylori eradication s/p delivery. The patient completed a trial of Metronidazole 500 mg 3 times a day, Doxycycline 100mg twice a day, Omeprazole 40 mg twice a day and Pepto-Bismol two tablets 4 times a day for 14 days for H. pylori eradication. The H. pylori breath test performed on November 6, 2023 was not detected. Gastritis: The patient has a history of gastritis noted on prior upper endoscopy. The patient is to avoid nonsteroidal anti-inflammatory drugs and aspirin. I recommend continue on a trial of Famotidine 20 mg twice a day PRN for 3 months for the symptoms. Elevated Liver Enzymes, S/P Delivery: The patient had elevated liver enzymes noted on prior blood work of unclear etiology. The patient denies any jaundice or pruritus. The patient denies any alcohol use. The patient denies taking large doses of nonsteroidal anti-inflammatory drugs or acetaminophen. I had a long discussion with the patient regarding the elevated liver enzymes and the possible progression of the liver disease. The patient was told of the possible increased risk of developing liver failure, ascites, renal impairment, ascites, GI bleeding, hepatic encephalopathy, bleeding tendencies. The patient was told of the importance of follow-up. The patient was advised to follow up every month for blood work. I recommend avoid alcohol and hepato-toxic agents. I recommend avoiding large doses of nonsteroidal anti-inflammatory drugs or acetaminophen. The patient agreed and will follow-up to reassess the symptoms. The patient is being followed by hepatologist, Dr. Jeffrey Hoang for the elevated liver enzymes. Fatty Liver: The patient had an imaging study suggestive of fatty infiltration of the liver. The patient denies any jaundice or pruritus. The patient denies any alcohol use. The patient denies taking large doses of nonsteroidal anti-inflammatory drugs or acetaminophen. The findings are suggestive of fatty liver. The patient and I had a long discussion regarding the risks of fatty liver and possible progression to cirrhosis. The patient was told of the possible increased risk of developing liver failure, cirrhosis, ascites, GI bleeding secondary to varices, hepatic encephalopathy, bleeding tendencies and liver cancer. The patient was told of the importance of follow-up. The patient was advised to follow up every 6 months for blood work and imaging studies. The patient agreed and will follow up. The patient was advised to lose weight and exercise. The patient was advised to abide by a Mediterranean diet. If the liver enzymes remain elevated, the patient may require a trial of Pioglitazone for the fatty liver. I recommend avoid alcohol and hepato-toxic agents. The patient was also advised to avoid NSAIDs, Acetaminophen and any other hepatotoxic drugs. The patient was also advised not to share needles, razors, scissors, nail clippers, etc.. The patient is to continue close follow-up in our office for blood work and exams. If the liver enzymes remain elevated, the patient may require a CT guided liver biopsy to assess the liver parenchyma for possible treatment. We had a long discussion regarding the risks and benefits of the procedure. The patient was told of the risks of bleeding, perforation, infections, emergency surgery and missing lesions. The patient agreed and will follow-up to reassess the symptoms. Follow-up: The patient is to follow-up in the office in 6 months to reassess the symptoms. The patient was told to call the office if any further problems.

## 2024-05-06 NOTE — HISTORY OF PRESENT ILLNESS
[de-identified] : The upper endoscopy performed by another gastroenterologist, Dr. Eve Lopez on September 6, 2022 revealed a normal esophagus, irregular Z-line at 40 cm from the incisors, gastroesophageal flap valve classified as Hill grade 2 (fold present, opens with respiration), erythematous mucosa in the stomach, mucosal nodule found in the duodenum likely Brunner's gland and normal examined duodenum.  The gastric pathology performed on September 6, 2022 revealed gastric antral and oxyntic mucosa with H. pylori associated chronic active gastritis that was positive for Helicobacter pylori and negative for intestinal metaplasia (stomach), duodenal mucosa with mild increase in intraepithelial lymphocytes with otherwise preserved villous architecture and mild increase in intraepithelial lymphocytes (duodenum) and polypoid duodenal mucosa with H. pylori associated chronic active peptic duodenitis that was negative for dysplasia or neoplasia (duodenal nodule). [FreeTextEntry1] : The MRI of the abdomen with and without IV contrast and elastography performed on October 3, 2023 revealed an enlarged fatty liver but otherwise normal liver morphology with no liver masses.  The hepatic fat fraction revealed mild steatosis hepatic iron deposition was none the hepatic stiffness was 2.5-2.9 kPa which is normal or inflammation. [de-identified] : The abdominal ultrasound performed on April 3, 2023 revealed hepatic steatosis, hepatomegaly and partially imaged gravid uterus.    The abdominal ultrasound performed on December 8, 2022 revealed heterogeneous liver which may reflect steatosis.

## 2024-07-01 NOTE — ED PROVIDER NOTE - PATIENT PORTAL LINK FT
Mother contacted me while on call.  Reports pt had a little blood in ear canal.  States child asleep at the present time.  I did instruct if any excessive bleeding, eval needed NOW at ER (related to time of day).  Otherwise, when child wakes up, check ear canal and pillow and if only a very small amount of dried discharge present, make appt for f/u on Monday.     You can access the FollowMyHealth Patient Portal offered by [facility] by registering at the following website: [email or web address]. By joining [employer]’s FollowMyHealth portal, you will also be able to view your health information using other applications (apps) compatible with our system.

## 2024-07-09 ENCOUNTER — APPOINTMENT (OUTPATIENT)
Dept: PULMONOLOGY | Facility: CLINIC | Age: 39
End: 2024-07-09

## 2024-08-22 NOTE — OB PROVIDER TRIAGE NOTE - NSADDLPREG2_OBGYN_ALL_OB
Subjective:       Patient ID: Liz Stahl Jr is a 80 y.o. male.    Chief Complaint: Follow-up (Pt is here for 6 mth ck up)    Here for 6 month check up and refills.    Follow-up  Pertinent negatives include no abdominal pain, chest pain, congestion, coughing, fatigue, fever, headaches, nausea, rash, sore throat or vomiting.     Review of Systems   Constitutional: Negative.  Negative for appetite change, fatigue and fever.   HENT: Negative.  Negative for congestion, ear pain and sore throat.    Eyes: Negative.  Negative for visual disturbance.   Respiratory: Negative.  Negative for cough, shortness of breath and wheezing.    Cardiovascular: Negative.  Negative for chest pain and palpitations.   Gastrointestinal: Negative.  Negative for abdominal pain, diarrhea, nausea and vomiting.   Genitourinary: Negative.  Negative for difficulty urinating.   Musculoskeletal: Negative.         Right middle finger with deformity of the DIP   Skin: Negative.  Negative for rash.   Neurological: Negative.  Negative for headaches.   Psychiatric/Behavioral: Negative.  Negative for sleep disturbance. The patient is not nervous/anxious.    All other systems reviewed and are negative.      Objective:      Physical Exam  Vitals and nursing note reviewed.   Constitutional:       General: He is not in acute distress.     Appearance: Normal appearance. He is well-developed.   HENT:      Head: Normocephalic and atraumatic.   Eyes:      Pupils: Pupils are equal, round, and reactive to light.   Cardiovascular:      Rate and Rhythm: Normal rate. Rhythm irregular.      Pulses: Normal pulses.      Heart sounds: Murmur (halosystolic murmur) heard.   Pulmonary:      Effort: Pulmonary effort is normal. No respiratory distress.      Breath sounds: Normal breath sounds.   Musculoskeletal:      Cervical back: Normal range of motion and neck supple.   Skin:     General: Skin is warm and dry.   Neurological:      Mental Status: He is alert and oriented  to person, place, and time.   Psychiatric:         Mood and Affect: Mood normal.         Assessment:       1. Type 2 diabetes mellitus without complication, without long-term current use of insulin        Plan:     1. Type 2 diabetes mellitus without complication, without long-term current use of insulin  -     metFORMIN (GLUCOPHAGE) 500 MG tablet; Take 1 tablet (500 mg total) by mouth daily with breakfast.  Dispense: 90 tablet; Refill: 1  -     Hemoglobin A1C; Future; Expected date: 08/22/2024    RTC 6 months for recheck      ADD ADDITIONAL PRIOR PREGNANCY INFORMATION...

## 2024-09-06 NOTE — ASU PATIENT PROFILE, ADULT - DATE OF FIRST COVID-19 BOOSTER
- DVT PPX: SCD's  - GI PPX: Protonix, home famotidine  - Dispo: likely home pending clinical course
30-Mar-2022

## 2024-09-16 NOTE — OB PROVIDER TRIAGE NOTE - NSDOBORLOSS2_OBGYN_ALL_OB_DT
Medication:   albuterol (VENTOLIN) (2.5 MG/3ML) 0.083% nebulizer solution 75 mL 1 6/27/2024 --      Protonix 40 MG tablet 90 tablet 0 4/11/2024 --   passed protocol.   Last office visit date: 05/07/2024  Next appointment scheduled?: No; Return in about 6 months (around 11/7/2024).    Number of refills given: 0    
11-[birth year]-2006

## 2024-11-06 ENCOUNTER — APPOINTMENT (OUTPATIENT)
Dept: GASTROENTEROLOGY | Facility: CLINIC | Age: 39
End: 2024-11-06

## 2025-01-31 NOTE — ED PROVIDER NOTE - IV ALTEPLASE INCLUSION HIDDEN
HISTORY OF PRESENT ILLNESS  Nohemy Mercedes  is a 56 y.o. y.o. female    She presents for health assessment, preventative care and follow-up with history of hypertension, hyperlipidemia, acquired  hypothyroidism, anxiety, vitamin D deficiency and obesity.        Mr#: [id number]      Past Medical History:   Diagnosis Date    DVT (deep venous thrombosis) (HCC)     Thyroid disease        Past Surgical History:   Procedure Laterality Date    BUNIONECTOMY  2011    b/l    COLONOSCOPY W/ POLYPECTOMY N/A 05/05/2023    Single sigmoid polypectomy, tubular adenoma, 7-year jruhau-[id number]. Wang    SKIN BIOPSY      mole removal from ear       Family History   Problem Relation Age of Onset    Lung Disease Father     Cancer Father 57        lung    Lung Disease Mother     Cancer Mother 64        lung       No Known Allergies    Social History     Tobacco Use   Smoking Status Never   Smokeless Tobacco Never       Social History     Substance and Sexual Activity   Alcohol Use Yes       Immunization History   Administered Date(s) Administered    COVID-19, MODERNA BLUE border, Primary or Immunocompromised, (age 12y+), IM, 100 mcg/0.5mL 01/06/2021, 02/03/2021, 10/26/2021    COVID-19, MODERNA Bivalent, (age 12y+), IM, 50 mcg/0.5 mL 10/14/2022    COVID-19, MODERNA, (age 12y+), IM, 50mcg/0.5mL 01/17/2023    Influenza Trivalent 09/09/2019    Influenza Virus Vaccine 10/03/2016, 09/01/2018, 10/05/2020, 10/26/2021, 11/17/2023    TDaP, ADACEL (age 10y-64y), BOOSTRIX (age 10y+), IM, 0.5mL 10/01/2009, 11/18/2016    Tst, Unspecified Formulation 11/05/2021    Zoster Recombinant (Shingrix) 01/24/2020, 06/25/2020       Patient Active Problem List   Diagnosis    History of DVT of lower extremity    Hypothyroidism due to acquired atrophy of thyroid    Vitamin D deficiency    Shift work sleep disorder    IUD (intrauterine device) in place    Obesity (BMI 30.0-34.9)    Pure hypercholesterolemia    Fatigue    Essential (primary) hypertension    Anxiety 
Nohemy Mercedes is a 56 y.o. female (: 1968) presenting to address:    Chief Complaint   Patient presents with    Annual Exam    Immunizations     Had flu shot        Vitals:    25 1000   BP: 120/78   Pulse: 62   Resp: 16   Temp: 97.5 °F (36.4 °C)   SpO2: 94%       \"Have you been to the ER, urgent care clinic since your last visit?  Hospitalized since your last visit?\"    NO    “Have you seen or consulted any other health care providers outside of [facility] since your last visit?”    [facility]              
show

## 2025-02-13 ENCOUNTER — NON-APPOINTMENT (OUTPATIENT)
Age: 40
End: 2025-02-13

## 2025-02-18 ENCOUNTER — APPOINTMENT (OUTPATIENT)
Dept: PULMONOLOGY | Facility: CLINIC | Age: 40
End: 2025-02-18
Payer: MEDICAID

## 2025-02-18 VITALS
WEIGHT: 293 LBS | OXYGEN SATURATION: 97 % | HEIGHT: 61 IN | HEART RATE: 77 BPM | BODY MASS INDEX: 55.32 KG/M2 | DIASTOLIC BLOOD PRESSURE: 90 MMHG | TEMPERATURE: 98.1 F | SYSTOLIC BLOOD PRESSURE: 132 MMHG

## 2025-02-18 DIAGNOSIS — Z98.84 BARIATRIC SURGERY STATUS: ICD-10-CM

## 2025-02-18 DIAGNOSIS — J45.909 UNSPECIFIED ASTHMA, UNCOMPLICATED: ICD-10-CM

## 2025-02-18 DIAGNOSIS — G47.30 SLEEP APNEA, UNSPECIFIED: ICD-10-CM

## 2025-02-18 DIAGNOSIS — Z01.818 ENCOUNTER FOR OTHER PREPROCEDURAL EXAMINATION: ICD-10-CM

## 2025-02-18 DIAGNOSIS — R06.09 OTHER FORMS OF DYSPNEA: ICD-10-CM

## 2025-02-18 PROCEDURE — 99214 OFFICE O/P EST MOD 30 MIN: CPT | Mod: 25

## 2025-02-18 PROCEDURE — 94729 DIFFUSING CAPACITY: CPT

## 2025-02-18 PROCEDURE — 94727 GAS DIL/WSHOT DETER LNG VOL: CPT

## 2025-02-18 PROCEDURE — 94060 EVALUATION OF WHEEZING: CPT

## 2025-05-06 ENCOUNTER — EMERGENCY (EMERGENCY)
Facility: HOSPITAL | Age: 40
LOS: 1 days | End: 2025-05-06
Attending: EMERGENCY MEDICINE
Payer: MEDICAID

## 2025-05-06 VITALS
OXYGEN SATURATION: 99 % | TEMPERATURE: 98 F | SYSTOLIC BLOOD PRESSURE: 121 MMHG | DIASTOLIC BLOOD PRESSURE: 80 MMHG | RESPIRATION RATE: 18 BRPM | HEART RATE: 70 BPM

## 2025-05-06 VITALS
WEIGHT: 283.07 LBS | RESPIRATION RATE: 17 BRPM | OXYGEN SATURATION: 98 % | SYSTOLIC BLOOD PRESSURE: 115 MMHG | DIASTOLIC BLOOD PRESSURE: 83 MMHG | HEIGHT: 61 IN | HEART RATE: 74 BPM | TEMPERATURE: 98 F

## 2025-05-06 DIAGNOSIS — Z90.3 ACQUIRED ABSENCE OF STOMACH [PART OF]: Chronic | ICD-10-CM

## 2025-05-06 LAB
ALBUMIN SERPL ELPH-MCNC: 3.6 G/DL — SIGNIFICANT CHANGE UP (ref 3.5–5)
ALP SERPL-CCNC: 81 U/L — SIGNIFICANT CHANGE UP (ref 40–120)
ALT FLD-CCNC: 41 U/L DA — SIGNIFICANT CHANGE UP (ref 10–60)
ANION GAP SERPL CALC-SCNC: 7 MMOL/L — SIGNIFICANT CHANGE UP (ref 5–17)
APPEARANCE UR: ABNORMAL
AST SERPL-CCNC: 38 U/L — SIGNIFICANT CHANGE UP (ref 10–40)
BACTERIA # UR AUTO: ABNORMAL /HPF
BASOPHILS # BLD AUTO: 0.03 K/UL — SIGNIFICANT CHANGE UP (ref 0–0.2)
BASOPHILS NFR BLD AUTO: 0.5 % — SIGNIFICANT CHANGE UP (ref 0–2)
BILIRUB SERPL-MCNC: 0.4 MG/DL — SIGNIFICANT CHANGE UP (ref 0.2–1.2)
BILIRUB UR-MCNC: NEGATIVE — SIGNIFICANT CHANGE UP
BUN SERPL-MCNC: 11 MG/DL — SIGNIFICANT CHANGE UP (ref 7–18)
CALCIUM SERPL-MCNC: 9.6 MG/DL — SIGNIFICANT CHANGE UP (ref 8.4–10.5)
CHLORIDE SERPL-SCNC: 108 MMOL/L — SIGNIFICANT CHANGE UP (ref 96–108)
CO2 SERPL-SCNC: 24 MMOL/L — SIGNIFICANT CHANGE UP (ref 22–31)
COLOR SPEC: SIGNIFICANT CHANGE UP
COMMENT - URINE: SIGNIFICANT CHANGE UP
CREAT SERPL-MCNC: 0.66 MG/DL — SIGNIFICANT CHANGE UP (ref 0.5–1.3)
DIFF PNL FLD: ABNORMAL
EGFR: 114 ML/MIN/1.73M2 — SIGNIFICANT CHANGE UP
EGFR: 114 ML/MIN/1.73M2 — SIGNIFICANT CHANGE UP
EOSINOPHIL # BLD AUTO: 0.06 K/UL — SIGNIFICANT CHANGE UP (ref 0–0.5)
EOSINOPHIL NFR BLD AUTO: 1 % — SIGNIFICANT CHANGE UP (ref 0–6)
EPI CELLS # UR: PRESENT
GLUCOSE SERPL-MCNC: 82 MG/DL — SIGNIFICANT CHANGE UP (ref 70–99)
GLUCOSE UR QL: NEGATIVE MG/DL — SIGNIFICANT CHANGE UP
HCG UR QL: NEGATIVE — SIGNIFICANT CHANGE UP
HCT VFR BLD CALC: 41.2 % — SIGNIFICANT CHANGE UP (ref 34.5–45)
HGB BLD-MCNC: 13.4 G/DL — SIGNIFICANT CHANGE UP (ref 11.5–15.5)
IMM GRANULOCYTES NFR BLD AUTO: 0.2 % — SIGNIFICANT CHANGE UP (ref 0–0.9)
KETONES UR-MCNC: >=160 MG/DL
LACTATE SERPL-SCNC: 1.2 MMOL/L — SIGNIFICANT CHANGE UP (ref 0.7–2)
LEUKOCYTE ESTERASE UR-ACNC: ABNORMAL
LIDOCAIN IGE QN: 127 U/L — HIGH (ref 13–75)
LYMPHOCYTES # BLD AUTO: 2.63 K/UL — SIGNIFICANT CHANGE UP (ref 1–3.3)
LYMPHOCYTES # BLD AUTO: 43.1 % — SIGNIFICANT CHANGE UP (ref 13–44)
MCHC RBC-ENTMCNC: 27.6 PG — SIGNIFICANT CHANGE UP (ref 27–34)
MCHC RBC-ENTMCNC: 32.5 G/DL — SIGNIFICANT CHANGE UP (ref 32–36)
MCV RBC AUTO: 84.9 FL — SIGNIFICANT CHANGE UP (ref 80–100)
MONOCYTES # BLD AUTO: 0.55 K/UL — SIGNIFICANT CHANGE UP (ref 0–0.9)
MONOCYTES NFR BLD AUTO: 9 % — SIGNIFICANT CHANGE UP (ref 2–14)
NEUTROPHILS # BLD AUTO: 2.82 K/UL — SIGNIFICANT CHANGE UP (ref 1.8–7.4)
NEUTROPHILS NFR BLD AUTO: 46.2 % — SIGNIFICANT CHANGE UP (ref 43–77)
NITRITE UR-MCNC: NEGATIVE — SIGNIFICANT CHANGE UP
NRBC BLD AUTO-RTO: 0 /100 WBCS — SIGNIFICANT CHANGE UP (ref 0–0)
PH UR: 6 — SIGNIFICANT CHANGE UP (ref 5–8)
PLATELET # BLD AUTO: 249 K/UL — SIGNIFICANT CHANGE UP (ref 150–400)
POTASSIUM SERPL-MCNC: 4.1 MMOL/L — SIGNIFICANT CHANGE UP (ref 3.5–5.3)
POTASSIUM SERPL-SCNC: 4.1 MMOL/L — SIGNIFICANT CHANGE UP (ref 3.5–5.3)
PROT SERPL-MCNC: 7.9 G/DL — SIGNIFICANT CHANGE UP (ref 6–8.3)
PROT UR-MCNC: 100 MG/DL
RBC # BLD: 4.85 M/UL — SIGNIFICANT CHANGE UP (ref 3.8–5.2)
RBC # FLD: 17.3 % — HIGH (ref 10.3–14.5)
RBC CASTS # UR COMP ASSIST: 3 /HPF — SIGNIFICANT CHANGE UP (ref 0–4)
SODIUM SERPL-SCNC: 139 MMOL/L — SIGNIFICANT CHANGE UP (ref 135–145)
SP GR SPEC: 1.03 — SIGNIFICANT CHANGE UP (ref 1–1.03)
UROBILINOGEN FLD QL: 1 MG/DL — SIGNIFICANT CHANGE UP (ref 0.2–1)
WBC # BLD: 6.1 K/UL — SIGNIFICANT CHANGE UP (ref 3.8–10.5)
WBC # FLD AUTO: 6.1 K/UL — SIGNIFICANT CHANGE UP (ref 3.8–10.5)
WBC UR QL: 3 /HPF — SIGNIFICANT CHANGE UP (ref 0–5)

## 2025-05-06 RX ORDER — ONDANSETRON HCL/PF 4 MG/2 ML
1 VIAL (ML) INJECTION
Qty: 15 | Refills: 0
Start: 2025-05-06 | End: 2025-05-10

## 2025-05-06 RX ORDER — ONDANSETRON HCL/PF 4 MG/2 ML
4 VIAL (ML) INJECTION ONCE
Refills: 0 | Status: COMPLETED | OUTPATIENT
Start: 2025-05-06 | End: 2025-05-06

## 2025-05-06 RX ORDER — MAGNESIUM, ALUMINUM HYDROXIDE 200-200 MG
30 TABLET,CHEWABLE ORAL ONCE
Refills: 0 | Status: COMPLETED | OUTPATIENT
Start: 2025-05-06 | End: 2025-05-06

## 2025-05-06 RX ADMIN — Medication 1000 MILLILITER(S): at 17:35

## 2025-05-06 RX ADMIN — Medication 30 MILLILITER(S): at 17:35

## 2025-05-06 RX ADMIN — Medication 4 MILLIGRAM(S): at 17:35

## 2025-05-06 RX ADMIN — Medication 1000 MILLILITER(S): at 18:35

## 2025-05-06 NOTE — ED PROVIDER NOTE - OBJECTIVE STATEMENT
39-year-old female, history of morbid obesity (status post sleeve gastrectomy 3 weeks ago at [facility] with Dr Ru Knox), HTN, GERD, REBECCA,  referred today by the visiting nurse for further evaluation of intermittent abdominal pain.  Reports that over the last week every morning has right upper quadrant sharp pain lasting for few seconds and then resolving on its own.  Associated with mild nausea for 3 days.  No vomiting.  Has been taking antiemetic medication but ran out of it.  Able to pass gas and has regular bowel movements.  Denies any urinary symptoms, fever, chills or any other complaints.

## 2025-05-06 NOTE — ED PROVIDER NOTE - CLINICAL SUMMARY MEDICAL DECISION MAKING FREE TEXT BOX
39-year-old female, presents with intermittent right upper quadrant pain lasting a few seconds daily.   Currently denies any abdominal pain.  Reports some nausea.  On exam well-appearing, vital signs stable, afebrile.   Grossly unremarkable exam.  Low suspicion for obstruction.  Plan for labs, urine, CT, meds and reassess. 39-year-old female, presents with intermittent right upper quadrant pain lasting a few seconds daily.   Currently denies any abdominal pain.  Reports some nausea.  On exam well-appearing, vital signs stable, afebrile.   Grossly unremarkable exam.  Low suspicion for obstruction.  Plan for labs, urine, CT, meds and reassess.    19:54- Mild lipase elevation. Tolerating PO intake. Denies any pain right now. CT shows splenic infarct. Patient already on Lovenox. Discussed with [facility] transfer [facility] with covering bariatric attending Dr Citlalli Hodges. No indications for transfer. Will dc with follow up with [facility] within 1-2 days.

## 2025-05-06 NOTE — ED PROVIDER NOTE - INTERNATIONAL TRAVEL
This pt. Is scheduled for Outpatient surgery on 8/21/2020 with Dr. Antoine at [facility] under general anesthesia Pro: Davinci assisted laparoscopic Right Inguinal hernia repair with mesh possible open 60816 Dx: Right Inguinal hernia K40.90 pt. Expressed understanding.    All this pt. Need is CBC, CHem7, EKG, and H&P please give pt. A call to schedule     
No

## 2025-05-06 NOTE — ED PROVIDER NOTE - PATIENT PORTAL LINK FT
You can access the FollowMyHealth Patient Portal offered by [facility] by registering at the following website: [email or web address]. By joining [employer]’s FollowMyHealth portal, you will also be able to view your health information using other applications (apps) compatible with our system.

## 2025-05-06 NOTE — ED ADULT NURSE NOTE - NSFALLUNIVINTERV_ED_ALL_ED
Bed/Stretcher in lowest position, wheels locked, appropriate side rails in place/Call bell, personal items and telephone in reach/Instruct patient to call for assistance before getting out of bed/chair/stretcher/Non-slip footwear applied when patient is off stretcher/[place] to call system/Physically safe environment - no spills, clutter or unnecessary equipment/Purposeful proactive rounding/Room/bathroom lighting operational, light cord in reach

## 2025-05-06 NOTE — ED ADULT NURSE NOTE - OBJECTIVE STATEMENT
Patient stated that she has abdominal pain starting since 2 days ago with no diarrhea or vomiting, nausea+

## 2025-05-06 NOTE — ED PROVIDER NOTE - NSFOLLOWUPINSTRUCTIONS_ED_ALL_ED_FT
Follow up with the [facility] within 1-2 days.    **If you experience any new or worsening symptoms or if you are concerned you can always come back to the emergency for a re-evaluation.    Some results may not be available at the time of your discharge from the hospital. You can download the FOLLOW MY HEALTH miryam and have access to these results.    If there were any prescriptions given to you during the visit today take them as prescribed. If you have any questions you can ask the pharmacist.

## 2025-05-20 ENCOUNTER — APPOINTMENT (OUTPATIENT)
Dept: PULMONOLOGY | Facility: CLINIC | Age: 40
End: 2025-05-20
Payer: MEDICAID

## 2025-05-20 VITALS
SYSTOLIC BLOOD PRESSURE: 98 MMHG | BODY MASS INDEX: 51.73 KG/M2 | HEART RATE: 81 BPM | OXYGEN SATURATION: 96 % | WEIGHT: 274 LBS | HEIGHT: 61 IN | DIASTOLIC BLOOD PRESSURE: 66 MMHG | TEMPERATURE: 97 F

## 2025-05-20 DIAGNOSIS — G47.30 SLEEP APNEA, UNSPECIFIED: ICD-10-CM

## 2025-05-20 DIAGNOSIS — J45.909 UNSPECIFIED ASTHMA, UNCOMPLICATED: ICD-10-CM

## 2025-05-20 DIAGNOSIS — Z98.84 BARIATRIC SURGERY STATUS: ICD-10-CM

## 2025-05-20 PROCEDURE — 94729 DIFFUSING CAPACITY: CPT

## 2025-05-20 PROCEDURE — 94727 GAS DIL/WSHOT DETER LNG VOL: CPT

## 2025-05-20 PROCEDURE — 99214 OFFICE O/P EST MOD 30 MIN: CPT | Mod: 25

## 2025-05-20 PROCEDURE — 94060 EVALUATION OF WHEEZING: CPT

## 2025-06-05 ENCOUNTER — APPOINTMENT (OUTPATIENT)
Dept: FAMILY MEDICINE | Facility: CLINIC | Age: 40
End: 2025-06-05
Payer: MEDICAID

## 2025-06-05 VITALS
BODY MASS INDEX: 51.35 KG/M2 | HEART RATE: 97 BPM | TEMPERATURE: 98.3 F | SYSTOLIC BLOOD PRESSURE: 112 MMHG | DIASTOLIC BLOOD PRESSURE: 80 MMHG | HEIGHT: 61 IN | WEIGHT: 272 LBS | OXYGEN SATURATION: 98 %

## 2025-06-05 DIAGNOSIS — Z98.84 BARIATRIC SURGERY STATUS: ICD-10-CM

## 2025-06-05 DIAGNOSIS — D64.9 ANEMIA, UNSPECIFIED: ICD-10-CM

## 2025-06-05 DIAGNOSIS — E66.01 MORBID (SEVERE) OBESITY DUE TO EXCESS CALORIES: ICD-10-CM

## 2025-06-05 DIAGNOSIS — K76.0 FATTY (CHANGE OF) LIVER, NOT ELSEWHERE CLASSIFIED: ICD-10-CM

## 2025-06-05 DIAGNOSIS — G47.30 SLEEP APNEA, UNSPECIFIED: ICD-10-CM

## 2025-06-05 PROCEDURE — 99214 OFFICE O/P EST MOD 30 MIN: CPT

## 2025-06-05 PROCEDURE — G2211 COMPLEX E/M VISIT ADD ON: CPT | Mod: NC

## 2025-06-05 RX ORDER — MECOBALAMIN 1000 MCG
TABLET,CHEWABLE ORAL
Refills: 0 | Status: ACTIVE | COMMUNITY

## 2025-06-05 RX ORDER — PNV NO.95/FERROUS FUM/FOLIC AC 28MG-0.8MG
TABLET ORAL
Refills: 0 | Status: ACTIVE | COMMUNITY

## 2025-06-05 RX ORDER — LORATADINE 10 MG
17 TABLET,DISINTEGRATING ORAL
Refills: 0 | Status: ACTIVE | COMMUNITY

## 2025-06-06 ENCOUNTER — NON-APPOINTMENT (OUTPATIENT)
Age: 40
End: 2025-06-06

## 2025-06-06 LAB
ALBUMIN SERPL ELPH-MCNC: 4.1 G/DL
ALP BLD-CCNC: 85 U/L
ALT SERPL-CCNC: 56 U/L
ANION GAP SERPL CALC-SCNC: 17 MMOL/L
AST SERPL-CCNC: 50 U/L
BILIRUB SERPL-MCNC: 0.3 MG/DL
BUN SERPL-MCNC: 17 MG/DL
CALCIUM SERPL-MCNC: 9.8 MG/DL
CHLORIDE SERPL-SCNC: 107 MMOL/L
CO2 SERPL-SCNC: 18 MMOL/L
CREAT SERPL-MCNC: 0.82 MG/DL
EGFRCR SERPLBLD CKD-EPI 2021: 93 ML/MIN/1.73M2
FERRITIN SERPL-MCNC: 36 NG/ML
FOLATE SERPL-MCNC: >20 NG/ML
GLUCOSE SERPL-MCNC: 76 MG/DL
HCT VFR BLD CALC: 40.6 %
HGB BLD-MCNC: 12.4 G/DL
IRON SATN MFR SERPL: 12 %
IRON SERPL-MCNC: 48 UG/DL
MCHC RBC-ENTMCNC: 28.1 PG
MCHC RBC-ENTMCNC: 30.5 G/DL
MCV RBC AUTO: 91.9 FL
PLATELET # BLD AUTO: 236 K/UL
POTASSIUM SERPL-SCNC: 4.5 MMOL/L
PROT SERPL-MCNC: 6.8 G/DL
RBC # BLD: 4.42 M/UL
RBC # FLD: 17.9 %
SODIUM SERPL-SCNC: 143 MMOL/L
TIBC SERPL-MCNC: 419 UG/DL
UIBC SERPL-MCNC: 371 UG/DL
VIT B12 SERPL-MCNC: 550 PG/ML
WBC # FLD AUTO: 6.81 K/UL

## 2025-08-06 ENCOUNTER — APPOINTMENT (OUTPATIENT)
Dept: HEPATOLOGY | Facility: CLINIC | Age: 40
End: 2025-08-06
Payer: MEDICAID

## 2025-08-06 VITALS
SYSTOLIC BLOOD PRESSURE: 109 MMHG | HEIGHT: 61 IN | HEART RATE: 74 BPM | OXYGEN SATURATION: 96 % | TEMPERATURE: 97.6 F | BODY MASS INDEX: 46.63 KG/M2 | RESPIRATION RATE: 16 BRPM | DIASTOLIC BLOOD PRESSURE: 76 MMHG | WEIGHT: 247 LBS

## 2025-08-06 DIAGNOSIS — R74.8 ABNORMAL LEVELS OF OTHER SERUM ENZYMES: ICD-10-CM

## 2025-08-06 DIAGNOSIS — R16.0 HEPATOMEGALY, NOT ELSEWHERE CLASSIFIED: ICD-10-CM

## 2025-08-06 DIAGNOSIS — K76.0 FATTY (CHANGE OF) LIVER, NOT ELSEWHERE CLASSIFIED: ICD-10-CM

## 2025-08-06 DIAGNOSIS — E66.01 MORBID (SEVERE) OBESITY DUE TO EXCESS CALORIES: ICD-10-CM

## 2025-08-06 DIAGNOSIS — R76.8 OTHER SPECIFIED ABNORMAL IMMUNOLOGICAL FINDINGS IN SERUM: ICD-10-CM

## 2025-08-06 PROCEDURE — 99214 OFFICE O/P EST MOD 30 MIN: CPT

## 2025-08-27 ENCOUNTER — LABORATORY RESULT (OUTPATIENT)
Age: 40
End: 2025-08-27

## 2025-08-27 ENCOUNTER — APPOINTMENT (OUTPATIENT)
Dept: INTERNAL MEDICINE | Facility: CLINIC | Age: 40
End: 2025-08-27
Payer: MEDICAID

## 2025-08-27 VITALS
HEIGHT: 61 IN | DIASTOLIC BLOOD PRESSURE: 78 MMHG | BODY MASS INDEX: 44.18 KG/M2 | WEIGHT: 234 LBS | TEMPERATURE: 97.6 F | OXYGEN SATURATION: 98 % | HEART RATE: 72 BPM | SYSTOLIC BLOOD PRESSURE: 113 MMHG

## 2025-08-27 DIAGNOSIS — Z23 ENCOUNTER FOR IMMUNIZATION: ICD-10-CM

## 2025-08-27 DIAGNOSIS — D64.9 ANEMIA, UNSPECIFIED: ICD-10-CM

## 2025-08-27 DIAGNOSIS — E66.813 OBESITY, CLASS 3: ICD-10-CM

## 2025-08-27 DIAGNOSIS — Z00.00 ENCOUNTER FOR GENERAL ADULT MEDICAL EXAMINATION W/OUT ABNORMAL FINDINGS: ICD-10-CM

## 2025-08-27 DIAGNOSIS — R79.89 OTHER SPECIFIED ABNORMAL FINDINGS OF BLOOD CHEMISTRY: ICD-10-CM

## 2025-08-27 PROCEDURE — 99395 PREV VISIT EST AGE 18-39: CPT | Mod: 25

## 2025-08-27 PROCEDURE — G0447 BEHAVIOR COUNSEL OBESITY 15M: CPT | Mod: 59

## 2025-08-27 PROCEDURE — G0008: CPT

## 2025-08-27 PROCEDURE — 90656 IIV3 VACC NO PRSV 0.5 ML IM: CPT

## 2025-08-29 LAB
25(OH)D3 SERPL-MCNC: 39.5 NG/ML
ALBUMIN SERPL ELPH-MCNC: 4.5 G/DL
ALP BLD-CCNC: 96 U/L
ALT SERPL-CCNC: 48 U/L
ANION GAP SERPL CALC-SCNC: 13 MMOL/L
APPEARANCE: ABNORMAL
AST SERPL-CCNC: 39 U/L
BASOPHILS # BLD AUTO: 0 K/UL
BASOPHILS NFR BLD AUTO: 0 %
BILIRUB SERPL-MCNC: 0.6 MG/DL
BILIRUBIN URINE: NEGATIVE
BLOOD URINE: NEGATIVE
BUN SERPL-MCNC: 16 MG/DL
CALCIUM SERPL-MCNC: 9.5 MG/DL
CHLORIDE SERPL-SCNC: 108 MMOL/L
CHOLEST SERPL-MCNC: 119 MG/DL
CO2 SERPL-SCNC: 20 MMOL/L
COLOR: NORMAL
CREAT SERPL-MCNC: 0.78 MG/DL
EGFRCR SERPLBLD CKD-EPI 2021: 99 ML/MIN/1.73M2
EOSINOPHIL # BLD AUTO: 0 K/UL
EOSINOPHIL NFR BLD AUTO: 0 %
FERRITIN SERPL-MCNC: 45 NG/ML
GLUCOSE QUALITATIVE U: NEGATIVE MG/DL
GLUCOSE SERPL-MCNC: 96 MG/DL
HCT VFR BLD CALC: 43.3 %
HDLC SERPL-MCNC: 34 MG/DL
HGB BLD-MCNC: 14 G/DL
IRON SATN MFR SERPL: 38 %
IRON SERPL-MCNC: 148 UG/DL
KETONES URINE: ABNORMAL MG/DL
LDLC SERPL-MCNC: 69 MG/DL
LEUKOCYTE ESTERASE URINE: ABNORMAL
LYMPHOCYTES # BLD AUTO: 2.75 K/UL
LYMPHOCYTES NFR BLD AUTO: 39.1 %
MAN DIFF?: NORMAL
MCHC RBC-ENTMCNC: 29.1 PG
MCHC RBC-ENTMCNC: 32.3 G/DL
MCV RBC AUTO: 90 FL
MONOCYTES # BLD AUTO: 0.37 K/UL
MONOCYTES NFR BLD AUTO: 5.2 %
NEUTROPHILS # BLD AUTO: 3.67 K/UL
NEUTROPHILS NFR BLD AUTO: 51.3 %
NITRITE URINE: NEGATIVE
NONHDLC SERPL-MCNC: 85 MG/DL
PH URINE: 6
PLATELET # BLD AUTO: 261 K/UL
POTASSIUM SERPL-SCNC: 4.1 MMOL/L
PROT SERPL-MCNC: 7.4 G/DL
PROTEIN URINE: 100 MG/DL
RBC # BLD: 4.81 M/UL
RBC # FLD: 14.9 %
SODIUM SERPL-SCNC: 140 MMOL/L
SPECIFIC GRAVITY URINE: >1.03
T4 FREE SERPL-MCNC: 1.4 NG/DL
TIBC SERPL-MCNC: 385 UG/DL
TRIGL SERPL-MCNC: 81 MG/DL
TSH SERPL-ACNC: 1.18 UIU/ML
UIBC SERPL-MCNC: 237 UG/DL
UROBILINOGEN URINE: 1 MG/DL
WBC # FLD AUTO: 7.03 K/UL

## (undated) DEVICE — DRSG 2X2

## (undated) DEVICE — BITE BLOCK ADULT 20 X 27MM (GREEN)

## (undated) DEVICE — VISTASEAL DUAL APPLICATOR

## (undated) DEVICE — PACK IV START WITH CHG

## (undated) DEVICE — GOWN LG

## (undated) DEVICE — DRSG BANDAID 0.75X3"

## (undated) DEVICE — CLAMP BX HOT RAD JAW 3

## (undated) DEVICE — SALIVA EJECTOR (BLUE)

## (undated) DEVICE — UNDERPAD LINEN SAVER 17 X 24"

## (undated) DEVICE — TUBING MEDI-VAC W MAXIGRIP CONNECTORS 1/4"X6'

## (undated) DEVICE — CONTAINER FORMALIN 80ML YELLOW

## (undated) DEVICE — CATH IV SAFE BC 22G X 1" (BLUE)

## (undated) DEVICE — FACESHIELD FULL VISOR

## (undated) DEVICE — DENTURE CUP PINK

## (undated) DEVICE — BIOPSY FORCEP COLD DISP

## (undated) DEVICE — DRSG CURITY GAUZE SPONGE 4 X 4" 12-PLY NON-STERILE

## (undated) DEVICE — BASIN EMESIS 10IN GRADUATED MAUVE

## (undated) DEVICE — LUBRICATING JELLY HR ONE SHOT 3G

## (undated) DEVICE — BIOPSY FORCEP RADIAL JAW 4 STANDARD WITH NEEDLE

## (undated) DEVICE — ELCTR ECG CONDUCTIVE ADHESIVE

## (undated) DEVICE — TUBING IV SET GRAVITY 3Y 100" MACRO

## (undated) DEVICE — LINE BREATHE SAMPLNG